# Patient Record
Sex: FEMALE | Race: WHITE | NOT HISPANIC OR LATINO | Employment: OTHER | URBAN - METROPOLITAN AREA
[De-identification: names, ages, dates, MRNs, and addresses within clinical notes are randomized per-mention and may not be internally consistent; named-entity substitution may affect disease eponyms.]

---

## 2017-04-05 ENCOUNTER — OFFICE VISIT (OUTPATIENT)
Dept: INTERNAL MEDICINE | Facility: CLINIC | Age: 77
End: 2017-04-05
Payer: MEDICARE

## 2017-04-05 VITALS
BODY MASS INDEX: 24.41 KG/M2 | WEIGHT: 143 LBS | HEIGHT: 64 IN | DIASTOLIC BLOOD PRESSURE: 70 MMHG | HEART RATE: 82 BPM | SYSTOLIC BLOOD PRESSURE: 100 MMHG

## 2017-04-05 DIAGNOSIS — M85.80 OSTEOPENIA, UNSPECIFIED LOCATION: ICD-10-CM

## 2017-04-05 DIAGNOSIS — M85.9 DISORDER OF BONE DENSITY AND STRUCTURE, UNSPECIFIED: ICD-10-CM

## 2017-04-05 DIAGNOSIS — M81.0 AGE-RELATED OSTEOPOROSIS WITHOUT CURRENT PATHOLOGICAL FRACTURE: ICD-10-CM

## 2017-04-05 DIAGNOSIS — E04.1 THYROID NODULE: ICD-10-CM

## 2017-04-05 DIAGNOSIS — F70 MILD MENTAL RETARDATION: Primary | ICD-10-CM

## 2017-04-05 DIAGNOSIS — F20.89 OTHER SCHIZOPHRENIA: ICD-10-CM

## 2017-04-05 DIAGNOSIS — Z12.31 OTHER SCREENING MAMMOGRAM: ICD-10-CM

## 2017-04-05 DIAGNOSIS — M85.872 OTHER SPECIFIED DISORDERS OF BONE DENSITY AND STRUCTURE, LEFT ANKLE AND FOOT: ICD-10-CM

## 2017-04-05 PROCEDURE — 99215 OFFICE O/P EST HI 40 MIN: CPT | Mod: S$PBB,,, | Performed by: INTERNAL MEDICINE

## 2017-04-05 PROCEDURE — 99999 PR PBB SHADOW E&M-EST. PATIENT-LVL III: CPT | Mod: PBBFAC,,, | Performed by: INTERNAL MEDICINE

## 2017-04-05 PROCEDURE — 99213 OFFICE O/P EST LOW 20 MIN: CPT | Mod: PBBFAC | Performed by: INTERNAL MEDICINE

## 2017-04-05 RX ORDER — CALCIUM CARBONATE 500(1250)
1 TABLET ORAL 2 TIMES DAILY
COMMUNITY

## 2017-04-05 RX ORDER — MINERAL OIL
180 ENEMA (ML) RECTAL DAILY PRN
COMMUNITY

## 2017-04-05 NOTE — PROGRESS NOTES
CHIEF COMPLAINT: Follow up of osteopenia, mild mentally handicapped, thyroid nodule and schizophrenia.     HPI: This is a 76 year old woman from [place] who presents with her QMRP, Joana followup of above. All history is obtained from Joana due to medical problems.Nettie continues to take Tums twice a day and multivitamin for her history of osteopenia. She had osteopenia on bone mineral density in . Her bone density in  was improved. BMD 4/15 did not suggest treatment. She denies any constipation from the calcium supplement. She has been happy. Her anxiety has been controlled on Prozac 20 mg daily and abilify 2 mg daily. No depression. Sleeping well. She denies hallucinations or insomnia. She continues to like to live in her group home at [facility]. Stable thyroid ultrasound 4/15. She denies any neck pain. She has been working at diet and exercise. Weight is stable. Energy level is good. She walks 4 days a week.      Denies neck pain. She had some neck pain and had physical therapy which helped     She has occasional numbness in her left foot when she sits too long. If she moves, her numbness resolves.    She complains of left knee pain in the evening and can keep her awake at night.     PAST MEDICAL HISTORY:   1. Mild mentally handicapped.   2. Schizophrenia.   3. A thyroid nodule, status post fine needle aspirate on 05/15/2003 , 2003 and 2006 (negative). Ultrasound in  was stable from   4. History of chest pain with a negative stress echocardiogram in 2003.   5. Osteopenia on bone mineral density 2004, improved in bone density . No treatmetn 2015    PAST SURGICAL HISTORY: Tonsillectomy and status post ORIF of the left leg.    MEDICATIONS: updated on epic    No known drug allergies.     SOCIAL HISTORY: No alcohol or tobacco. Resident of [facility].    FAMILY HISTORY: Her father  in his late 30s of a heart attack. Her mother  of Alzheimer's. A  "brother  in an explosion.     REVIEW OF SYSTEMS: She denies fevers, chills, night sweats, fatigue, visual changes, hearing loss, shortness of breath, chest pain, palpitations, nausea, vomiting, constipation, or diarrhea. She has a bowel movement once a day. She denies dysuria hematuria, polydipsia, polyuria, anemia, seizures, or numbness, joint pain or muscle pain    PHYSICAL EXAM:   /70  Pulse 82  Ht 5' 4" (1.626 m)  Wt 64.9 kg (143 lb)  BMI 24.55 kg/m2    GENERAL: She is alert, oriented, in no apparent distress.   Affect within normal limits. Conjunctivae anicteric. Pupils equal, round, and reactive   to light. EOMI. Tympanic membranes clear. Oropharynx clear.   NECK: Supple. No cervical lymphadenopathy. Left lobe of the thyroid was enlarged.. No JVD.   RESPIRATORY: Effort normal.   LUNGS: Clear to auscultation.   HEART: Regular rate and rhythm without murmurs, gallops, or rubs. No lower extremity edema.   ABDOMEN: Soft, nondistended, and nontender. Bowel sounds present. No hepatosplenomegaly  Breasts: No abnormalities seen, No nodules palpated. No axillary lympadenopathy.     ASSESSMENT AND PLAN:   1. Mild mentally handicapped, resident of [facility] [employer]. A 90L Form and [employer] form were filled out.   2. Schizophrenia, doing well followed by Dr. Hanna.   3. thyroid nodule. Stable thyroid ultrasound 4/15 - ordered  4. Osteopenia: On calcium. BMD 4/15  5. Screening. She is due for a mammogram  (ordered) . She had a normal colonscopy May 2009 - due . Bone mineral denisty 4/15, GYN exam   6. I will check a CBC, CMP, TSH, and fasting lipids, vitamin D level due to the above problems.   7. Numbness left foot - likely due to being sedentary - will monitor for now. Change positions often  8. LEft knee pain - tylenol 1 gram at bedtime  I will see Nettie back in 1 year, sooner if problems arise   Prevnar   "

## 2017-04-13 ENCOUNTER — LAB VISIT (OUTPATIENT)
Dept: LAB | Facility: HOSPITAL | Age: 77
End: 2017-04-13
Attending: INTERNAL MEDICINE
Payer: MEDICARE

## 2017-04-13 DIAGNOSIS — Z79.899 ENCOUNTER FOR LONG-TERM (CURRENT) USE OF OTHER MEDICATIONS: Primary | ICD-10-CM

## 2017-04-13 LAB
ALBUMIN SERPL BCP-MCNC: 3.5 G/DL
ALP SERPL-CCNC: 64 U/L
ALT SERPL W/O P-5'-P-CCNC: 18 U/L
ANION GAP SERPL CALC-SCNC: 8 MMOL/L
AST SERPL-CCNC: 23 U/L
BASOPHILS # BLD AUTO: 0.02 K/UL
BASOPHILS NFR BLD: 0.5 %
BILIRUB SERPL-MCNC: 0.4 MG/DL
BUN SERPL-MCNC: 27 MG/DL
CALCIUM SERPL-MCNC: 9.3 MG/DL
CHLORIDE SERPL-SCNC: 106 MMOL/L
CHOLEST/HDLC SERPL: 3.3 {RATIO}
CO2 SERPL-SCNC: 27 MMOL/L
CREAT SERPL-MCNC: 0.9 MG/DL
DIFFERENTIAL METHOD: ABNORMAL
EOSINOPHIL # BLD AUTO: 0.1 K/UL
EOSINOPHIL NFR BLD: 3.1 %
ERYTHROCYTE [DISTWIDTH] IN BLOOD BY AUTOMATED COUNT: 13.7 %
EST. GFR  (AFRICAN AMERICAN): >60 ML/MIN/1.73 M^2
EST. GFR  (NON AFRICAN AMERICAN): >60 ML/MIN/1.73 M^2
GLUCOSE SERPL-MCNC: 90 MG/DL
HCT VFR BLD AUTO: 33.4 %
HDL/CHOLESTEROL RATIO: 30.2 %
HDLC SERPL-MCNC: 159 MG/DL
HDLC SERPL-MCNC: 48 MG/DL
HGB BLD-MCNC: 11.1 G/DL
LDLC SERPL CALC-MCNC: 94 MG/DL
LYMPHOCYTES # BLD AUTO: 1.7 K/UL
LYMPHOCYTES NFR BLD: 43.3 %
MCH RBC QN AUTO: 32.1 PG
MCHC RBC AUTO-ENTMCNC: 33.2 %
MCV RBC AUTO: 97 FL
MONOCYTES # BLD AUTO: 0.3 K/UL
MONOCYTES NFR BLD: 7.5 %
NEUTROPHILS # BLD AUTO: 1.8 K/UL
NEUTROPHILS NFR BLD: 45.6 %
NONHDLC SERPL-MCNC: 111 MG/DL
PLATELET # BLD AUTO: 235 K/UL
PMV BLD AUTO: 12.5 FL
POTASSIUM SERPL-SCNC: 4.4 MMOL/L
PROT SERPL-MCNC: 7 G/DL
RBC # BLD AUTO: 3.46 M/UL
SODIUM SERPL-SCNC: 141 MMOL/L
TRIGL SERPL-MCNC: 85 MG/DL
TSH SERPL DL<=0.005 MIU/L-ACNC: 0.62 UIU/ML
WBC # BLD AUTO: 3.86 K/UL

## 2017-04-13 PROCEDURE — 80061 LIPID PANEL: CPT

## 2017-04-13 PROCEDURE — 36415 COLL VENOUS BLD VENIPUNCTURE: CPT

## 2017-04-13 PROCEDURE — 80053 COMPREHEN METABOLIC PANEL: CPT

## 2017-04-13 PROCEDURE — 85025 COMPLETE CBC W/AUTO DIFF WBC: CPT

## 2017-04-13 PROCEDURE — 84443 ASSAY THYROID STIM HORMONE: CPT

## 2017-04-26 ENCOUNTER — HOSPITAL ENCOUNTER (OUTPATIENT)
Dept: RADIOLOGY | Facility: CLINIC | Age: 77
Discharge: HOME | End: 2017-04-26
Attending: INTERNAL MEDICINE
Payer: MEDICARE

## 2017-04-26 ENCOUNTER — HOSPITAL ENCOUNTER (OUTPATIENT)
Dept: RADIOLOGY | Facility: HOSPITAL | Age: 77
Discharge: HOME | End: 2017-04-26
Attending: INTERNAL MEDICINE
Payer: MEDICARE

## 2017-04-26 DIAGNOSIS — M85.9 DISORDER OF BONE DENSITY AND STRUCTURE, UNSPECIFIED: ICD-10-CM

## 2017-04-26 DIAGNOSIS — E04.1 THYROID NODULE: ICD-10-CM

## 2017-04-26 DIAGNOSIS — M85.872 OTHER SPECIFIED DISORDERS OF BONE DENSITY AND STRUCTURE, LEFT ANKLE AND FOOT: ICD-10-CM

## 2017-04-26 PROCEDURE — 77080 DXA BONE DENSITY AXIAL: CPT | Mod: 26,,, | Performed by: INTERNAL MEDICINE

## 2017-04-26 PROCEDURE — 76536 US EXAM OF HEAD AND NECK: CPT | Mod: TC

## 2017-04-26 PROCEDURE — 77080 DXA BONE DENSITY AXIAL: CPT | Mod: TC

## 2017-04-26 PROCEDURE — 76536 US EXAM OF HEAD AND NECK: CPT | Mod: 26,,, | Performed by: RADIOLOGY

## 2017-05-07 RX ORDER — ALENDRONATE SODIUM 70 MG/1
70 TABLET ORAL
Qty: 4 TABLET | Refills: 11 | Status: SHIPPED | OUTPATIENT
Start: 2017-05-07 | End: 2018-05-07

## 2017-05-19 ENCOUNTER — TELEPHONE (OUTPATIENT)
Dept: OBSTETRICS AND GYNECOLOGY | Facility: CLINIC | Age: 77
End: 2017-05-19

## 2017-05-19 DIAGNOSIS — Z12.39 BREAST CANCER SCREENING: Primary | ICD-10-CM

## 2017-05-19 NOTE — TELEPHONE ENCOUNTER
----- Message from Delores Montes De Oca MA sent at 5/19/2017  9:39 AM CDT -----      ----- Message -----     From: Karel Tobias     Sent: 5/19/2017   9:33 AM       To: Stephen Gale Staff    Please put orders in for a mmg so I can schedule thanks

## 2017-06-28 ENCOUNTER — HOSPITAL ENCOUNTER (OUTPATIENT)
Dept: RADIOLOGY | Facility: HOSPITAL | Age: 77
Discharge: HOME | End: 2017-06-28
Attending: NURSE PRACTITIONER
Payer: MEDICARE

## 2017-06-28 VITALS — WEIGHT: 143 LBS | BODY MASS INDEX: 24.41 KG/M2 | HEIGHT: 64 IN

## 2017-06-28 DIAGNOSIS — Z12.39 BREAST CANCER SCREENING: ICD-10-CM

## 2017-06-28 DIAGNOSIS — Z12.31 VISIT FOR SCREENING MAMMOGRAM: ICD-10-CM

## 2017-06-28 PROCEDURE — 77067 SCR MAMMO BI INCL CAD: CPT | Mod: TC

## 2017-06-28 PROCEDURE — 77067 SCR MAMMO BI INCL CAD: CPT | Mod: 26,,, | Performed by: RADIOLOGY

## 2017-07-25 ENCOUNTER — OFFICE VISIT (OUTPATIENT)
Dept: OBSTETRICS AND GYNECOLOGY | Facility: CLINIC | Age: 77
End: 2017-07-25
Payer: MEDICARE

## 2017-07-25 VITALS
HEIGHT: 63 IN | SYSTOLIC BLOOD PRESSURE: 122 MMHG | DIASTOLIC BLOOD PRESSURE: 74 MMHG | WEIGHT: 140 LBS | BODY MASS INDEX: 24.8 KG/M2

## 2017-07-25 DIAGNOSIS — Z01.419 VISIT FOR GYNECOLOGIC EXAMINATION: Primary | ICD-10-CM

## 2017-07-25 DIAGNOSIS — Z78.0 POSTMENOPAUSE: ICD-10-CM

## 2017-07-25 PROCEDURE — G0101 CA SCREEN;PELVIC/BREAST EXAM: HCPCS | Mod: PBBFAC | Performed by: NURSE PRACTITIONER

## 2017-07-25 PROCEDURE — G0101 CA SCREEN;PELVIC/BREAST EXAM: HCPCS | Mod: S$PBB,,, | Performed by: NURSE PRACTITIONER

## 2017-07-25 PROCEDURE — 99212 OFFICE O/P EST SF 10 MIN: CPT | Mod: PBBFAC | Performed by: NURSE PRACTITIONER

## 2017-07-25 PROCEDURE — 99999 PR PBB SHADOW E&M-EST. PATIENT-LVL II: CPT | Mod: PBBFAC,,, | Performed by: NURSE PRACTITIONER

## 2017-10-05 ENCOUNTER — IMMUNIZATION (OUTPATIENT)
Dept: INTERNAL MEDICINE | Facility: CLINIC | Age: 77
End: 2017-10-05
Payer: MEDICARE

## 2017-10-05 PROCEDURE — 90662 IIV NO PRSV INCREASED AG IM: CPT | Mod: PBBFAC | Performed by: INTERNAL MEDICINE

## 2018-04-04 ENCOUNTER — OFFICE VISIT (OUTPATIENT)
Dept: INTERNAL MEDICINE | Facility: CLINIC | Age: 78
End: 2018-04-04
Payer: MEDICARE

## 2018-04-04 VITALS
WEIGHT: 143.69 LBS | HEIGHT: 63 IN | HEART RATE: 73 BPM | SYSTOLIC BLOOD PRESSURE: 110 MMHG | BODY MASS INDEX: 25.46 KG/M2 | DIASTOLIC BLOOD PRESSURE: 60 MMHG | OXYGEN SATURATION: 99 %

## 2018-04-04 DIAGNOSIS — M85.80 OSTEOPENIA, UNSPECIFIED LOCATION: ICD-10-CM

## 2018-04-04 DIAGNOSIS — Z12.31 SCREENING MAMMOGRAM, ENCOUNTER FOR: ICD-10-CM

## 2018-04-04 DIAGNOSIS — F20.89 OTHER SCHIZOPHRENIA: ICD-10-CM

## 2018-04-04 DIAGNOSIS — E04.1 THYROID NODULE: Primary | ICD-10-CM

## 2018-04-04 DIAGNOSIS — F70 MILD MENTAL RETARDATION: ICD-10-CM

## 2018-04-04 PROCEDURE — 99212 OFFICE O/P EST SF 10 MIN: CPT | Mod: PBBFAC | Performed by: INTERNAL MEDICINE

## 2018-04-04 PROCEDURE — 99999 PR PBB SHADOW E&M-EST. PATIENT-LVL II: CPT | Mod: PBBFAC,,, | Performed by: INTERNAL MEDICINE

## 2018-04-04 PROCEDURE — 99215 OFFICE O/P EST HI 40 MIN: CPT | Mod: S$PBB,,, | Performed by: INTERNAL MEDICINE

## 2018-04-04 RX ORDER — DOCUSATE SODIUM 100 MG/1
100 CAPSULE, LIQUID FILLED ORAL DAILY
COMMUNITY

## 2018-04-04 NOTE — PROGRESS NOTES
CHIEF COMPLAINT: Follow up of osteopenia, mild mentally handicapped, thyroid nodule and schizophrenia.     HPI: This is a 77 year old woman from [place] who presents with her nephew Buzz, followup of above. All history is obtained from Buzz due to medical problems.Nettie continues to take Tums twice a day and multivitamin for her history of osteopenia. She had osteopenia on bone mineral density in . Her bone density in  was improved. BMD 4/15 did not suggest treatment. She denies any constipation from the calcium supplement. She has been happy. Her anxiety has been controlled on Prozac 20 mg daily and abilify 2 mg daily. No depression. Sleeping well. She denies hallucinations or insomnia. She continues to like to live in her group home at [facility]. Stable thyroid ultrasound . She denies any neck pain. She has been working at diet and exercise at [employer]. Weight is stable. Energy level is good. .      Denies neck pain. She had some neck pain and had physical therapy in the past which helped     She has slight knee pain which comes and goes.     PAST MEDICAL HISTORY:   1. Mild mentally handicapped.   2. Schizophrenia.   3. A thyroid nodule, status post fine needle aspirate on 05/15/2003 , 2003 and 2006 (negative). Ultrasound in  was stable from   4. History of chest pain with a negative stress echocardiogram in 2003.   5. Osteopenia on bone mineral density 2004, improved in bone density . No treatmetn 2015    PAST SURGICAL HISTORY: Tonsillectomy and status post ORIF of the left leg.    MEDICATIONS: updated on epic    No known drug allergies.     SOCIAL HISTORY: No alcohol or tobacco. Resident of [facility].    FAMILY HISTORY: Her father  in his late 30s of a heart attack. Her mother  of Alzheimer's. A brother  in an explosion.     REVIEW OF SYSTEMS: She denies fevers, chills, night sweats, fatigue, visual changes, hearing loss, shortness of  "breath, chest pain, palpitations, nausea, vomiting, constipation, or diarrhea. She has a bowel movement once a day. She denies dysuria hematuria, polydipsia, polyuria, anemia, seizures, or numbness, joint pain or muscle pain    PHYSICAL EXAM:   /60   Pulse 73   Ht 5' 3" (1.6 m)   Wt 65.2 kg (143 lb 11.2 oz)   SpO2 99%   BMI 25.46 kg/m²     GENERAL: She is alert, oriented, in no apparent distress.   Affect within normal limits. Conjunctivae anicteric. Pupils equal, round, and reactive   to light. EOMI. Tympanic membranes clear. Oropharynx clear.   NECK: Supple. No cervical lymphadenopathy. Left lobe of the thyroid was enlarged.. No JVD.   RESPIRATORY: Effort normal.   LUNGS: Clear to auscultation.   HEART: Regular rate and rhythm without murmurs, gallops, or rubs. No lower extremity edema.   ABDOMEN: Soft, nondistended, and nontender. Bowel sounds present. No hepatosplenomegaly  Breasts: No abnormalities seen, No nodules palpated. No axillary lympadenopathy.     ASSESSMENT AND PLAN:   1. Mild mentally handicapped, resident of [place]. A 90L Form and [employer] form were filled out.   2. Schizophrenia, doing well followed by Dr. Hanna.   3. thyroid nodule. Stable thyroid ultrasound 4/17  4. Osteopenia: On calcium. BMD 4/17 worse, back on Fosamax 4/2017  5. Screening. She is due for a mammogram 6/17(ordered) . She had a normal colonscopy May 2009 - due 2019. Bone mineral denisty 4/17, GYN exam 4/13  6. I will check a CBC, CMP, TSH, and fasting lipids, vitamin D level due to the above problems.   7.  LEft knee pain - asx currently  I will see Nettie back in 1 year, sooner if problems arise   Prevnar 4/16  "

## 2018-04-12 ENCOUNTER — LAB VISIT (OUTPATIENT)
Dept: LAB | Facility: HOSPITAL | Age: 78
End: 2018-04-12
Attending: INTERNAL MEDICINE
Payer: MEDICARE

## 2018-04-12 DIAGNOSIS — Z79.899 DRUG THERAPY: Primary | ICD-10-CM

## 2018-04-12 LAB
ALBUMIN SERPL BCP-MCNC: 3.7 G/DL
ALP SERPL-CCNC: 64 U/L
ALT SERPL W/O P-5'-P-CCNC: 19 U/L
ANION GAP SERPL CALC-SCNC: 7 MMOL/L
AST SERPL-CCNC: 20 U/L
BASOPHILS # BLD AUTO: 0.03 K/UL
BASOPHILS NFR BLD: 0.8 %
BILIRUB SERPL-MCNC: 0.4 MG/DL
BUN SERPL-MCNC: 25 MG/DL
CALCIUM SERPL-MCNC: 9.8 MG/DL
CHLORIDE SERPL-SCNC: 106 MMOL/L
CHOLEST SERPL-MCNC: 159 MG/DL
CHOLEST/HDLC SERPL: 3.2 {RATIO}
CO2 SERPL-SCNC: 29 MMOL/L
CREAT SERPL-MCNC: 0.9 MG/DL
DIFFERENTIAL METHOD: ABNORMAL
EOSINOPHIL # BLD AUTO: 0.1 K/UL
EOSINOPHIL NFR BLD: 3.2 %
ERYTHROCYTE [DISTWIDTH] IN BLOOD BY AUTOMATED COUNT: 13.1 %
EST. GFR  (AFRICAN AMERICAN): >60 ML/MIN/1.73 M^2
EST. GFR  (NON AFRICAN AMERICAN): >60 ML/MIN/1.73 M^2
GLUCOSE SERPL-MCNC: 90 MG/DL
HCT VFR BLD AUTO: 32.9 %
HDLC SERPL-MCNC: 49 MG/DL
HDLC SERPL: 30.8 %
HGB BLD-MCNC: 10.7 G/DL
IMM GRANULOCYTES # BLD AUTO: 0 K/UL
IMM GRANULOCYTES NFR BLD AUTO: 0 %
LDLC SERPL CALC-MCNC: 93 MG/DL
LYMPHOCYTES # BLD AUTO: 1.6 K/UL
LYMPHOCYTES NFR BLD: 42.1 %
MCH RBC QN AUTO: 33.4 PG
MCHC RBC AUTO-ENTMCNC: 32.5 G/DL
MCV RBC AUTO: 103 FL
MONOCYTES # BLD AUTO: 0.3 K/UL
MONOCYTES NFR BLD: 8.8 %
NEUTROPHILS # BLD AUTO: 1.7 K/UL
NEUTROPHILS NFR BLD: 45.1 %
NONHDLC SERPL-MCNC: 110 MG/DL
NRBC BLD-RTO: 0 /100 WBC
PLATELET # BLD AUTO: 200 K/UL
PMV BLD AUTO: 12.7 FL
POTASSIUM SERPL-SCNC: 4.5 MMOL/L
PROT SERPL-MCNC: 7.1 G/DL
RBC # BLD AUTO: 3.2 M/UL
SODIUM SERPL-SCNC: 142 MMOL/L
TRIGL SERPL-MCNC: 85 MG/DL
TSH SERPL DL<=0.005 MIU/L-ACNC: 0.65 UIU/ML
WBC # BLD AUTO: 3.73 K/UL

## 2018-04-12 PROCEDURE — 80061 LIPID PANEL: CPT

## 2018-04-12 PROCEDURE — 84443 ASSAY THYROID STIM HORMONE: CPT

## 2018-04-12 PROCEDURE — 36415 COLL VENOUS BLD VENIPUNCTURE: CPT

## 2018-04-12 PROCEDURE — 85025 COMPLETE CBC W/AUTO DIFF WBC: CPT

## 2018-04-12 PROCEDURE — 80053 COMPREHEN METABOLIC PANEL: CPT

## 2018-04-23 ENCOUNTER — LAB VISIT (OUTPATIENT)
Dept: LAB | Facility: HOSPITAL | Age: 78
End: 2018-04-23
Attending: INTERNAL MEDICINE
Payer: MEDICARE

## 2018-04-23 DIAGNOSIS — Z79.899 NEED FOR PROPHYLACTIC CHEMOTHERAPY: Primary | ICD-10-CM

## 2018-04-23 LAB — VIT B12 SERPL-MCNC: 372 PG/ML

## 2018-04-23 PROCEDURE — 82607 VITAMIN B-12: CPT

## 2018-06-29 ENCOUNTER — HOSPITAL ENCOUNTER (OUTPATIENT)
Dept: RADIOLOGY | Facility: HOSPITAL | Age: 78
Discharge: HOME | End: 2018-06-29
Attending: INTERNAL MEDICINE
Payer: MEDICARE

## 2018-06-29 DIAGNOSIS — Z12.31 SCREENING MAMMOGRAM, ENCOUNTER FOR: ICD-10-CM

## 2018-06-29 PROCEDURE — 77067 SCR MAMMO BI INCL CAD: CPT | Mod: 26,,, | Performed by: RADIOLOGY

## 2018-06-29 PROCEDURE — 77067 SCR MAMMO BI INCL CAD: CPT | Mod: TC

## 2018-10-23 ENCOUNTER — IMMUNIZATION (OUTPATIENT)
Dept: INTERNAL MEDICINE | Facility: CLINIC | Age: 78
End: 2018-10-23
Payer: MEDICARE

## 2018-10-23 PROCEDURE — 90662 IIV NO PRSV INCREASED AG IM: CPT | Mod: PBBFAC

## 2019-04-03 ENCOUNTER — OFFICE VISIT (OUTPATIENT)
Dept: INTERNAL MEDICINE | Facility: CLINIC | Age: 79
End: 2019-04-03
Payer: MEDICARE

## 2019-04-03 VITALS
WEIGHT: 143.75 LBS | HEIGHT: 60 IN | HEART RATE: 81 BPM | SYSTOLIC BLOOD PRESSURE: 120 MMHG | DIASTOLIC BLOOD PRESSURE: 56 MMHG | BODY MASS INDEX: 28.22 KG/M2

## 2019-04-03 DIAGNOSIS — Z12.31 SCREENING MAMMOGRAM, ENCOUNTER FOR: ICD-10-CM

## 2019-04-03 DIAGNOSIS — Z12.11 SCREENING FOR MALIGNANT NEOPLASM OF COLON: Primary | ICD-10-CM

## 2019-04-03 DIAGNOSIS — F70 MILD MENTAL RETARDATION: ICD-10-CM

## 2019-04-03 DIAGNOSIS — M85.80 OSTEOPENIA, UNSPECIFIED LOCATION: ICD-10-CM

## 2019-04-03 DIAGNOSIS — E04.1 THYROID NODULE: ICD-10-CM

## 2019-04-03 DIAGNOSIS — M89.9 BONE DISORDER: ICD-10-CM

## 2019-04-03 DIAGNOSIS — F20.89 OTHER SCHIZOPHRENIA: ICD-10-CM

## 2019-04-03 PROCEDURE — 99215 PR OFFICE/OUTPT VISIT, EST, LEVL V, 40-54 MIN: ICD-10-PCS | Mod: S$PBB,,, | Performed by: INTERNAL MEDICINE

## 2019-04-03 PROCEDURE — 99213 OFFICE O/P EST LOW 20 MIN: CPT | Mod: PBBFAC | Performed by: INTERNAL MEDICINE

## 2019-04-03 PROCEDURE — 99999 PR PBB SHADOW E&M-EST. PATIENT-LVL III: CPT | Mod: PBBFAC,,, | Performed by: INTERNAL MEDICINE

## 2019-04-03 PROCEDURE — 99215 OFFICE O/P EST HI 40 MIN: CPT | Mod: S$PBB,,, | Performed by: INTERNAL MEDICINE

## 2019-04-03 PROCEDURE — 99999 PR PBB SHADOW E&M-EST. PATIENT-LVL III: ICD-10-PCS | Mod: PBBFAC,,, | Performed by: INTERNAL MEDICINE

## 2019-04-03 RX ORDER — ACETAMINOPHEN 500 MG
1000 TABLET ORAL NIGHTLY
COMMUNITY

## 2019-04-03 NOTE — PROGRESS NOTES
CHIEF COMPLAINT: Follow up of osteopenia, mild mentally handicapped, thyroid nodule and schizophrenia.     HPI: This is a 78 year old woman from [facility] who presents with her nephew Buzz, followup of above. All history is obtained from Buzz due to medical problems.Nettie continues to take Tums twice a day and multivitamin for her history of osteopenia. She had osteopenia on bone mineral density in . Her bone density in  was improved. BMD 4/15 did not suggest treatment. She denies any constipation from the calcium supplement. She has been happy. Her anxiety has been controlled on Prozac 20 mg daily and abilify 2 mg daily. No depression. Sleeping well. She denies hallucinations or insomnia. She continues to like to live in her group home at [facility]. Stable thyroid ultrasound . She denies any neck pain. She has been working at diet and exercise at [employer]. Weight is stable. Energy level is good. .      Denies neck pain. She had some neck pain and had physical therapy in the past which helped     She has slight knee pain which comes and goes. She is taking tylenol in the evening which is helping.     PAST MEDICAL HISTORY:   1. Mild mentally handicapped.   2. Schizophrenia.   3. A thyroid nodule, status post fine needle aspirate on 05/15/2003 , 2003 and 2006 (negative). Ultrasound in  was stable from   4. History of chest pain with a negative stress echocardiogram in 2003.   5. Osteopenia on bone mineral density 2004, improved in bone density . No treatmetn 2015    PAST SURGICAL HISTORY: Tonsillectomy and status post ORIF of the left leg.    MEDICATIONS: updated on epic    No known drug allergies.     SOCIAL HISTORY: No alcohol or tobacco. Resident of [facility].    FAMILY HISTORY: Her father  in his late 30s of a heart attack. Her mother  of Alzheimer's. A brother  in an explosion.     REVIEW OF SYSTEMS: She denies fevers, chills, night sweats,  fatigue, visual changes, hearing loss, shortness of breath, chest pain, palpitations, nausea, vomiting, constipation, or diarrhea. She has a bowel movement once a day. She denies dysuria hematuria, polydipsia, polyuria, anemia, seizures, or numbness, joint pain or muscle pain    PHYSICAL EXAM:   BP (!) 120/56   Pulse 81   Ht 5' (1.524 m)   Wt 65.2 kg (143 lb 11.8 oz)   BMI 28.07 kg/m²     GENERAL: She is alert, oriented, in no apparent distress.   Affect within normal limits. Conjunctivae anicteric. Pupils equal, round, and reactive   to light. EOMI. Tympanic membranes clear. Oropharynx clear.   NECK: Supple. No cervical lymphadenopathy. Left lobe of the thyroid was enlarged.. No JVD.   RESPIRATORY: Effort normal.   LUNGS: Clear to auscultation.   HEART: Regular rate and rhythm without murmurs, gallops, or rubs. No lower extremity edema.   ABDOMEN: Soft, nondistended, and nontender. Bowel sounds present. No hepatosplenomegaly  Breasts: No abnormalities seen, No nodules palpated. No axillary lympadenopathy.     ASSESSMENT AND PLAN:   1. Mild mentally handicapped, resident of [facility]. A 90L Form filled out.   2. Schizophrenia, doing well followed by Dr. Hanna.   3. thyroid nodule. Stable thyroid ultrasound 4/17  4. Osteopenia: On calcium. BMD 4/17 worse, back on Fosamax 4/2017  5. Screening. She is due for a mammogram 6/18(ordered) . She had a normal colonscopy May 2009 - due 2019 - ordered. Bone mineral denisty 4/17 -ordered, GYN exam 4/13  6. I will check a CBC, CMP, TSH, and fasting lipids, vitamin D level due to the above problems.   7.  LEft knee pain - stable with tylenol  I will see Nettie back in 1 year, sooner if problems arise   Prevnar 4/16

## 2019-04-11 ENCOUNTER — LAB VISIT (OUTPATIENT)
Dept: LAB | Facility: HOSPITAL | Age: 79
End: 2019-04-11
Attending: INTERNAL MEDICINE
Payer: MEDICARE

## 2019-04-11 DIAGNOSIS — Z79.899 NEED FOR PROPHYLACTIC CHEMOTHERAPY: Primary | ICD-10-CM

## 2019-04-11 LAB
ALBUMIN SERPL BCP-MCNC: 3.8 G/DL (ref 3.5–5.2)
ALP SERPL-CCNC: 70 U/L (ref 55–135)
ALT SERPL W/O P-5'-P-CCNC: 19 U/L (ref 10–44)
ANION GAP SERPL CALC-SCNC: 7 MMOL/L (ref 8–16)
AST SERPL-CCNC: 20 U/L (ref 10–40)
BASOPHILS # BLD AUTO: 0.03 K/UL (ref 0–0.2)
BASOPHILS NFR BLD: 0.5 % (ref 0–1.9)
BILIRUB SERPL-MCNC: 0.4 MG/DL (ref 0.1–1)
BUN SERPL-MCNC: 32 MG/DL (ref 8–23)
CALCIUM SERPL-MCNC: 9.9 MG/DL (ref 8.7–10.5)
CHLORIDE SERPL-SCNC: 106 MMOL/L (ref 95–110)
CHOLEST SERPL-MCNC: 162 MG/DL (ref 120–199)
CHOLEST/HDLC SERPL: 3.4 {RATIO} (ref 2–5)
CO2 SERPL-SCNC: 28 MMOL/L (ref 23–29)
CREAT SERPL-MCNC: 0.9 MG/DL (ref 0.5–1.4)
DIFFERENTIAL METHOD: ABNORMAL
EOSINOPHIL # BLD AUTO: 0.1 K/UL (ref 0–0.5)
EOSINOPHIL NFR BLD: 1.8 % (ref 0–8)
ERYTHROCYTE [DISTWIDTH] IN BLOOD BY AUTOMATED COUNT: 13.2 % (ref 11.5–14.5)
EST. GFR  (AFRICAN AMERICAN): >60 ML/MIN/1.73 M^2
EST. GFR  (NON AFRICAN AMERICAN): >60 ML/MIN/1.73 M^2
FERRITIN SERPL-MCNC: 65 NG/ML (ref 20–300)
GLUCOSE SERPL-MCNC: 91 MG/DL (ref 70–110)
HCT VFR BLD AUTO: 34.3 % (ref 37–48.5)
HDLC SERPL-MCNC: 47 MG/DL (ref 40–75)
HDLC SERPL: 29 % (ref 20–50)
HGB BLD-MCNC: 10.8 G/DL (ref 12–16)
IMM GRANULOCYTES # BLD AUTO: 0.01 K/UL (ref 0–0.04)
IMM GRANULOCYTES NFR BLD AUTO: 0.2 % (ref 0–0.5)
LDLC SERPL CALC-MCNC: 98.6 MG/DL (ref 63–159)
LYMPHOCYTES # BLD AUTO: 1.5 K/UL (ref 1–4.8)
LYMPHOCYTES NFR BLD: 25 % (ref 18–48)
MCH RBC QN AUTO: 32.8 PG (ref 27–31)
MCHC RBC AUTO-ENTMCNC: 31.5 G/DL (ref 32–36)
MCV RBC AUTO: 104 FL (ref 82–98)
MONOCYTES # BLD AUTO: 0.5 K/UL (ref 0.3–1)
MONOCYTES NFR BLD: 8.2 % (ref 4–15)
NEUTROPHILS # BLD AUTO: 3.9 K/UL (ref 1.8–7.7)
NEUTROPHILS NFR BLD: 64.3 % (ref 38–73)
NONHDLC SERPL-MCNC: 115 MG/DL
NRBC BLD-RTO: 0 /100 WBC
PLATELET # BLD AUTO: 209 K/UL (ref 150–350)
PMV BLD AUTO: 12.7 FL (ref 9.2–12.9)
POTASSIUM SERPL-SCNC: 4.5 MMOL/L (ref 3.5–5.1)
PROT SERPL-MCNC: 7.3 G/DL (ref 6–8.4)
RBC # BLD AUTO: 3.29 M/UL (ref 4–5.4)
SODIUM SERPL-SCNC: 141 MMOL/L (ref 136–145)
TRIGL SERPL-MCNC: 82 MG/DL (ref 30–150)
TSH SERPL DL<=0.005 MIU/L-ACNC: 0.78 UIU/ML (ref 0.4–4)
WBC # BLD AUTO: 6.07 K/UL (ref 3.9–12.7)

## 2019-04-11 PROCEDURE — 85025 COMPLETE CBC W/AUTO DIFF WBC: CPT

## 2019-04-11 PROCEDURE — 80053 COMPREHEN METABOLIC PANEL: CPT

## 2019-04-11 PROCEDURE — 84443 ASSAY THYROID STIM HORMONE: CPT

## 2019-04-11 PROCEDURE — 82728 ASSAY OF FERRITIN: CPT

## 2019-04-11 PROCEDURE — 80061 LIPID PANEL: CPT

## 2019-04-12 ENCOUNTER — HOSPITAL ENCOUNTER (OUTPATIENT)
Dept: RADIOLOGY | Facility: CLINIC | Age: 79
Discharge: HOME | End: 2019-04-12
Attending: INTERNAL MEDICINE
Payer: MEDICARE

## 2019-04-12 DIAGNOSIS — M89.9 BONE DISORDER: ICD-10-CM

## 2019-04-12 PROCEDURE — 77080 DEXA BONE DENSITY SPINE HIP: ICD-10-PCS | Mod: 26,,, | Performed by: INTERNAL MEDICINE

## 2019-04-12 PROCEDURE — 77080 DXA BONE DENSITY AXIAL: CPT | Mod: TC

## 2019-04-12 PROCEDURE — 77080 DXA BONE DENSITY AXIAL: CPT | Mod: 26,,, | Performed by: INTERNAL MEDICINE

## 2019-07-05 ENCOUNTER — HOSPITAL ENCOUNTER (OUTPATIENT)
Dept: RADIOLOGY | Facility: HOSPITAL | Age: 79
Discharge: HOME | End: 2019-07-05
Attending: INTERNAL MEDICINE
Payer: MEDICARE

## 2019-07-05 DIAGNOSIS — Z12.31 SCREENING MAMMOGRAM, ENCOUNTER FOR: ICD-10-CM

## 2019-07-05 PROCEDURE — 77067 MAMMO DIGITAL SCREENING BILAT WITH CAD: ICD-10-PCS | Mod: 26,,, | Performed by: RADIOLOGY

## 2019-07-05 PROCEDURE — 77067 SCR MAMMO BI INCL CAD: CPT | Mod: TC

## 2019-07-05 PROCEDURE — 77067 SCR MAMMO BI INCL CAD: CPT | Mod: 26,,, | Performed by: RADIOLOGY

## 2019-07-30 ENCOUNTER — TELEPHONE (OUTPATIENT)
Dept: OBSTETRICS AND GYNECOLOGY | Facility: CLINIC | Age: 79
End: 2019-07-30

## 2019-07-30 ENCOUNTER — OFFICE VISIT (OUTPATIENT)
Dept: OBSTETRICS AND GYNECOLOGY | Facility: CLINIC | Age: 79
End: 2019-07-30
Payer: MEDICARE

## 2019-07-30 VITALS
DIASTOLIC BLOOD PRESSURE: 68 MMHG | BODY MASS INDEX: 28.57 KG/M2 | SYSTOLIC BLOOD PRESSURE: 134 MMHG | HEIGHT: 60 IN | WEIGHT: 145.5 LBS

## 2019-07-30 DIAGNOSIS — Z78.0 POSTMENOPAUSE: ICD-10-CM

## 2019-07-30 DIAGNOSIS — Z86.018 HISTORY OF UTERINE FIBROID: ICD-10-CM

## 2019-07-30 DIAGNOSIS — Z01.419 WELL WOMAN EXAM WITH ROUTINE GYNECOLOGICAL EXAM: Primary | ICD-10-CM

## 2019-07-30 PROCEDURE — G0101 CA SCREEN;PELVIC/BREAST EXAM: HCPCS | Mod: S$PBB,,, | Performed by: NURSE PRACTITIONER

## 2019-07-30 PROCEDURE — 99999 PR PBB SHADOW E&M-EST. PATIENT-LVL III: CPT | Mod: PBBFAC,,, | Performed by: NURSE PRACTITIONER

## 2019-07-30 PROCEDURE — 99999 PR PBB SHADOW E&M-EST. PATIENT-LVL III: ICD-10-PCS | Mod: PBBFAC,,, | Performed by: NURSE PRACTITIONER

## 2019-07-30 PROCEDURE — G0101 PR CA SCREEN;PELVIC/BREAST EXAM: ICD-10-PCS | Mod: S$PBB,,, | Performed by: NURSE PRACTITIONER

## 2019-07-30 PROCEDURE — G0101 CA SCREEN;PELVIC/BREAST EXAM: HCPCS | Mod: PBBFAC | Performed by: NURSE PRACTITIONER

## 2019-07-30 PROCEDURE — 99213 OFFICE O/P EST LOW 20 MIN: CPT | Mod: PBBFAC,25 | Performed by: NURSE PRACTITIONER

## 2019-07-30 NOTE — PROGRESS NOTES
HISTORY OF PRESENT ILLNESS:    Nettie Villasenor is a 78 y.o. female , presents with an Aide from [facility]  for a routine exam and has no gyn complaints.      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    Osteopenia 3/13/2013    Schizophrenia 3/13/2013    Thyroid nodule        Past Surgical History:   Procedure Laterality Date    ORIF left lower leg      TONSILLECTOMY          MEDICATIONS AND ALLERGIES:      Current Outpatient Medications:     ABILIFY 2 mg Tab, Take 2 mg by mouth once daily. , Disp: , Rfl:     acetaminophen (TYLENOL EXTRA STRENGTH) 500 MG tablet, Take 1,000 mg by mouth every evening., Disp: , Rfl:     aspirin 81 MG Chew, Take 81 mg by mouth once daily., Disp: , Rfl:     calcium carbonate (OS-OTTONIEL) 500 mg calcium (1,250 mg) tablet, Take 1 tablet by mouth 2 (two) times daily., Disp: , Rfl:     docusate sodium (COLACE) 100 MG capsule, Take 100 mg by mouth once daily., Disp: , Rfl:     fexofenadine (ALLEGRA) 180 MG tablet, Take 180 mg by mouth daily as needed., Disp: , Rfl:     fluoxetine (PROZAC) 20 MG capsule, Take 20 mg by mouth once daily. , Disp: , Rfl:     lorazepam (ATIVAN) 0.5 MG tablet, Take 0.5 mg by mouth every 12 (twelve) hours as needed.  , Disp: , Rfl:     multivitamin capsule, Take 1 capsule by mouth once daily., Disp: , Rfl:     vitamin D 1000 units Tab, Take 185 mg by mouth once daily., Disp: , Rfl:     alendronate (FOSAMAX) 70 MG tablet, Take 1 tablet (70 mg total) by mouth every 7 days., Disp: 4 tablet, Rfl: 11    Review of patient's allergies indicates:  No Known Allergies    Family History   Problem Relation Age of Onset    Dementia Mother     Heart attack Father     Melanoma Neg Hx     Breast cancer Neg Hx     Colon cancer Neg Hx     Ovarian cancer Neg Hx        Social History     Socioeconomic History    Marital status: Single     Spouse name: Not on file    Number  of children: Not on file    Years of education: Not on file    Highest education level: Not on file   Occupational History    Not on file   Social Needs    Financial resource strain: Not on file    Food insecurity:     Worry: Not on file     Inability: Not on file    Transportation needs:     Medical: Not on file     Non-medical: Not on file   Tobacco Use    Smoking status: Never Smoker    Smokeless tobacco: Never Used   Substance and Sexual Activity    Alcohol use: No    Drug use: No    Sexual activity: Never   Lifestyle    Physical activity:     Days per week: Not on file     Minutes per session: Not on file    Stress: Not on file   Relationships    Social connections:     Talks on phone: Not on file     Gets together: Not on file     Attends Druze service: Not on file     Active member of club or organization: Not on file     Attends meetings of clubs or organizations: Not on file     Relationship status: Not on file   Other Topics Concern    Are you pregnant or think you may be? Not Asked    Breast-feeding Not Asked   Social History Narrative    Not on file       OBSTETRIC HISTORY: None    COMPREHENSIVE GYN HISTORY: There is no chart history of:  Pap History: Denies abn Paps.  Infection History: Denies STDs. Denies PID.  Benign History: Reports uterine fibroids. Denies ovarian cysts. Denies endometriosis.   Cancer History: Denies Cervical Cancer. Denies Uterus Cancer. Denies Ovarian Cancer. Denies Vaginal Cancer. Denies Vulvar Cancer. Denies Breast Cancer. Denies Colon Cancer.  Sexual Activity History: Denies sexual activity.   Menstrual History: Denies menses. Patient is postmenopausal: Not on HRT/ERT.    ROS:  GENERAL: No weight changes. No swelling. No fatigue. No fever.  CARDIOVASCULAR: No chest pain. No shortness of breath. No leg cramps.   NEUROLOGICAL: No headaches. No vision changes.  BREASTS: No pain. No lumps. No discharge.  ABDOMEN: No pain. No nausea. No vomiting. No diarrhea. No  constipation.  REPRODUCTIVE: No abnormal bleeding.   VULVA: No pain. No lesions. No itching.  VAGINA: No relaxation. No itching. No odor. No discharge. No lesions.  URINARY: No incontinence. No nocturia. No frequency. No dysuria.    /68   Ht 5' (1.524 m)   Wt 66 kg (145 lb 8.1 oz)   BMI 28.42 kg/m²   7-25-17 Wt 63.5 kg (140 lb)    PE:  APPEARANCE: Well nourished, well developed, in no acute distress.  AFFECT: WNL, alert and oriented x 3.  SKIN: No hirsutism or acne.  NECK: Neck symmetric without masses or thyromegaly.  NODES: No inguinal, cervical, axillary or femoral lymph node enlargement.  CHEST: Good respiratory effort.   ABDOMEN: Soft. No tenderness or masses.   BREASTS: Symmetrical, no skin changes or visible lesions. No palpable masses, nipple discharge bilaterally.  PELVIC: ATROPHIC EXTERNAL FEMALE GENITALIA without lesions. Normal hair distribution. Adequate perineal body, normal urethral meatus. VAGINA DRY without lesions or discharge. CERVIX STENOTIC without lesions, discharge or tenderness. No significant cystocele or rectocele. Bimanual exam shows uterus to be normal size, regular, mobile and nontender. Adnexa without masses or tenderness.  RECTAL: Rectovaginal exam confirms above with normal sphincter tone, no masses.  EXTREMITIES: No edema.    DIAGNOSIS:  1. Well woman exam with routine gynecological exam    2. Postmenopause    3. History of uterine fibroid        PLAN:    ORDERS:  Mammogram ordered and up to date on BMD and colonoscopy    COUNSELING:  The patient / Aide were counseled today on:  -osteoporosis prevention and regular weight bearing exercise;  -A.C.S. Pap and pelvic exam guidelines (pap every 3 years, no pap after age 65) and recommendations for yearly mammogram;  -to see her PCP for other health maintenance.    FOLLOW-UP with Dr Manuel in two years.

## 2019-10-10 ENCOUNTER — HOSPITAL ENCOUNTER (OUTPATIENT)
Dept: RADIOLOGY | Facility: HOSPITAL | Age: 79
Discharge: HOME | End: 2019-10-10
Attending: INTERNAL MEDICINE
Payer: MEDICARE

## 2019-10-10 ENCOUNTER — TELEPHONE (OUTPATIENT)
Dept: INTERNAL MEDICINE | Facility: CLINIC | Age: 79
End: 2019-10-10

## 2019-10-10 DIAGNOSIS — M79.604 RIGHT LEG PAIN: ICD-10-CM

## 2019-10-10 DIAGNOSIS — S82.831A CLOSED FRACTURE OF DISTAL END OF RIGHT FIBULA, UNSPECIFIED FRACTURE MORPHOLOGY, INITIAL ENCOUNTER: Primary | ICD-10-CM

## 2019-10-10 DIAGNOSIS — M79.604 RIGHT LEG PAIN: Primary | ICD-10-CM

## 2019-10-10 PROCEDURE — 73630 X-RAY EXAM OF FOOT: CPT | Mod: TC,RT

## 2019-10-10 PROCEDURE — 73630 XR FOOT COMPLETE 3 VIEW RIGHT: ICD-10-PCS | Mod: 26,RT,, | Performed by: RADIOLOGY

## 2019-10-10 PROCEDURE — 73610 X-RAY EXAM OF ANKLE: CPT | Mod: TC,RT

## 2019-10-10 PROCEDURE — 73610 X-RAY EXAM OF ANKLE: CPT | Mod: 26,RT,, | Performed by: RADIOLOGY

## 2019-10-10 PROCEDURE — 73610 XR ANKLE COMPLETE 3 VIEW RIGHT: ICD-10-PCS | Mod: 26,RT,, | Performed by: RADIOLOGY

## 2019-10-10 PROCEDURE — 73630 X-RAY EXAM OF FOOT: CPT | Mod: 26,RT,, | Performed by: RADIOLOGY

## 2019-10-10 NOTE — TELEPHONE ENCOUNTER
----- Message from Amita Callahan MA sent at 10/10/2019  2:58 PM CDT -----  Contact: Dr. Belle/ Radiology ext [phone]      ----- Message -----  From: Bruno Irvin  Sent: 10/10/2019   2:56 PM CDT  To: Scott ESPINOZA Staff    Patient has a possible fracture laterally right ankle.    Thank you

## 2019-10-11 ENCOUNTER — OFFICE VISIT (OUTPATIENT)
Dept: ORTHOPEDICS | Facility: CLINIC | Age: 79
End: 2019-10-11
Payer: MEDICARE

## 2019-10-11 ENCOUNTER — PATIENT OUTREACH (OUTPATIENT)
Dept: ADMINISTRATIVE | Facility: OTHER | Age: 79
End: 2019-10-11

## 2019-10-11 VITALS — BODY MASS INDEX: 28.57 KG/M2 | HEIGHT: 60 IN | WEIGHT: 145.5 LBS

## 2019-10-11 DIAGNOSIS — S82.831A CLOSED FRACTURE OF DISTAL END OF RIGHT FIBULA, UNSPECIFIED FRACTURE MORPHOLOGY, INITIAL ENCOUNTER: ICD-10-CM

## 2019-10-11 PROCEDURE — 99999 PR PBB SHADOW E&M-EST. PATIENT-LVL III: CPT | Mod: PBBFAC,,, | Performed by: NURSE PRACTITIONER

## 2019-10-11 PROCEDURE — 99999 PR PBB SHADOW E&M-EST. PATIENT-LVL III: ICD-10-PCS | Mod: PBBFAC,,, | Performed by: NURSE PRACTITIONER

## 2019-10-11 PROCEDURE — 99213 OFFICE O/P EST LOW 20 MIN: CPT | Mod: PBBFAC | Performed by: NURSE PRACTITIONER

## 2019-10-11 PROCEDURE — 99204 PR OFFICE/OUTPT VISIT, NEW, LEVL IV, 45-59 MIN: ICD-10-PCS | Mod: S$PBB,,, | Performed by: NURSE PRACTITIONER

## 2019-10-11 PROCEDURE — 99204 OFFICE O/P NEW MOD 45 MIN: CPT | Mod: S$PBB,,, | Performed by: NURSE PRACTITIONER

## 2019-10-11 NOTE — PROGRESS NOTES
SUBJECTIVE:     Chief Complaint & History of Present Illness:  Nettie Villasenor is a New 78 y.o. year old female patient here for constant right foot/ankle pain which has been present for 1 week.  There is a history of injury.  The pain is located in the lateral aspect of the foot.  The pain is described as achy, 0/10.  It is aggravated by walking and twisting of foot.  There is not radiation, numbness or tingling into the toes.  Associated symptoms include swelling, worsens with activity. Previous treatments include none.  There is not a history of previous injury or surgery to the foot.   The patient does not use an assistive device.     In summary, patient is a resident of [place], she states she twisted her ankle when trying to get into a van on Saturday.  She developed pain and swelling afterwards, she called her PCP who ordered x-rays.  X-rays concerned for a distal fibula fracture and she was then referred here.    Review of patient's allergies indicates:  No Known Allergies      Current Outpatient Medications   Medication Sig Dispense Refill    ABILIFY 2 mg Tab Take 2 mg by mouth once daily.       acetaminophen (TYLENOL EXTRA STRENGTH) 500 MG tablet Take 1,000 mg by mouth every evening.      aspirin 81 MG Chew Take 81 mg by mouth once daily.      calcium carbonate (OS-OTTONIEL) 500 mg calcium (1,250 mg) tablet Take 1 tablet by mouth 2 (two) times daily.      docusate sodium (COLACE) 100 MG capsule Take 100 mg by mouth once daily.      fexofenadine (ALLEGRA) 180 MG tablet Take 180 mg by mouth daily as needed.      fluoxetine (PROZAC) 20 MG capsule Take 20 mg by mouth once daily.       multivitamin capsule Take 1 capsule by mouth once daily.      vitamin D 1000 units Tab Take 185 mg by mouth once daily.      alendronate (FOSAMAX) 70 MG tablet Take 1 tablet (70 mg total) by mouth every 7 days. 4 tablet 11    lorazepam (ATIVAN) 0.5 MG tablet Take 0.5 mg by mouth every 12 (twelve) hours as needed.          No current facility-administered medications for this visit.        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    Osteopenia 3/13/2013    Schizophrenia 3/13/2013    Thyroid nodule        Past Surgical History:   Procedure Laterality Date    ORIF left lower leg      TONSILLECTOMY         Family History   Problem Relation Age of Onset    Dementia Mother     Heart attack Father     Melanoma Neg Hx     Breast cancer Neg Hx     Colon cancer Neg Hx     Ovarian cancer Neg Hx          Review of Systems:  ROS:  Constitutional: no fever or chills  Eyes: no visual changes  ENT: no nasal congestion or sore throat  Respiratory: no cough or shortness of breath  Cardiovascular: no chest pain or palpitations  Gastrointestinal: no nausea or vomiting, tolerating diet  Genitourinary: no hematuria or dysuria  Integument/Breast: no rash or pruritis  Hematologic/Lymphatic: no easy bruising or lymphadenopathy  Musculoskeletal: right ankle pain  Neurological: no seizures or tremors  Behavioral/Psych: no auditory or visual hallucinations  Endocrine: no heat or cold intolerance      OBJECTIVE:     PHYSICAL EXAM:  Vital Signs (Most Recent)  There were no vitals filed for this visit.  Height: 5' (152.4 cm) Weight: 66 kg (145 lb 8.1 oz),   General Appearance: Well nourished, well developed, in no acute distress.  HENT: Normal cephalic, oropharynx pink and moist  Eyes: PERRLA bilaterally and EOM x 4  Respiratory: Even and unlabored  Skin: Warm and Dry.   Psychiatric: AAO x 4, Mood & affect are normal.    right  Foot/Ankle    General appearance: no acute distress, alert/oriented x3, appropriate mood and affect, looks stated age and well nourished  The examination was performed out of splint/cast  Skin: bruising  Swelling: mild  Warmth: no warmth  Tenderness: diffuse  ROM: pain with inversion otherwise ROM is normal  Strength: normal  Gait:  antalgic  Stability: stable to testing and Cotton test: negative  Crepitus: no  Neurological Exam: normal  Vascular Exam: normal and pulse present    soft tissue swelling noted over the lateral and medial aspect of ankle      RADIOGRAPHS:  X-ray of right ankle and foot dated yesterday, personally reviewed by me and Dr. Byrnes shows what appears to be an avulsion fracture to tip of her fibula.  She has soft tissue swelling.  Ankle mortise appears intact and no other fractures seen.    ASSESSMENT/PLAN:       ICD-10-CM ICD-9-CM   1. Closed fracture of distal end of right fibula, unspecified fracture morphology, initial encounter S82.831A 824.8       Plan:  -Patient presents with c/c right ankle injury 1 week ago resulting in a distal fibula fracture.  -X-ray as above.  -Plan of care discussed with MD, he suggest walking boot as tolerated and to follow up in a couple of weeks.  -Recommend RICE therapy, Tylenol PRN for pain control.  -Patient to follow up in 4 weeks or sooner for new or worsening pain.

## 2019-10-11 NOTE — LETTER
October 11, 2019      Carolina Chavez MD  [address]  [facility] [address]           [facility]  [address]  [facility] [address]  Phone: [phone]          Patient: Nettie Villasenor   MR Number: [id number]   YOB: 1940   Date of Visit: 10/11/2019       Dear Dr. Carolina Chavez:    Thank you for referring Nettie Villasenor to me for evaluation. Attached you will find relevant portions of my assessment and plan of care.    If you have questions, please do not hesitate to call me. I look forward to following Nettie Villasenor along with you.    Sincerely,    Brennen Zuluaga, MELISSA    Enclosure  CC:  No Recipients    If you would like to receive this communication electronically, please contact [email or web address] or [phone] to request more information on [employer] access.    For providers and/or their staff who would like to refer a patient to [facility], please contact us through our one-stop-shop provider referral line, [facility] , at [phone].    If you feel you have received this communication in error or would no longer like to receive these types of communications, please e-mail [email or web address]

## 2019-10-11 NOTE — TELEPHONE ENCOUNTER
PLease notify annmarie    She had a small fracture on the right fibula  She needs to see Ortho - apt booked for today (friday) 10/11/19 at 1 pm in ortho with Brennen palma.

## 2019-10-22 ENCOUNTER — IMMUNIZATION (OUTPATIENT)
Dept: INTERNAL MEDICINE | Facility: CLINIC | Age: 79
End: 2019-10-22
Payer: MEDICARE

## 2019-10-22 PROCEDURE — 90662 IIV NO PRSV INCREASED AG IM: CPT | Mod: PBBFAC | Performed by: INTERNAL MEDICINE

## 2019-10-22 PROCEDURE — G0008 ADMIN INFLUENZA VIRUS VAC: HCPCS | Mod: PBBFAC

## 2019-11-06 ENCOUNTER — PATIENT OUTREACH (OUTPATIENT)
Dept: ADMINISTRATIVE | Facility: OTHER | Age: 79
End: 2019-11-06

## 2019-11-07 ENCOUNTER — HOSPITAL ENCOUNTER (OUTPATIENT)
Dept: RADIOLOGY | Facility: HOSPITAL | Age: 79
Discharge: HOME | End: 2019-11-07
Attending: NURSE PRACTITIONER
Payer: MEDICARE

## 2019-11-07 ENCOUNTER — OFFICE VISIT (OUTPATIENT)
Dept: ORTHOPEDICS | Facility: CLINIC | Age: 79
End: 2019-11-07
Payer: MEDICARE

## 2019-11-07 VITALS
RESPIRATION RATE: 18 BRPM | TEMPERATURE: 98 F | DIASTOLIC BLOOD PRESSURE: 63 MMHG | HEART RATE: 80 BPM | SYSTOLIC BLOOD PRESSURE: 124 MMHG

## 2019-11-07 DIAGNOSIS — S82.64XD CLOSED NONDISPLACED FRACTURE OF LATERAL MALLEOLUS OF RIGHT FIBULA WITH ROUTINE HEALING, SUBSEQUENT ENCOUNTER: Primary | ICD-10-CM

## 2019-11-07 DIAGNOSIS — S82.831D CLOSED FRACTURE OF DISTAL END OF RIGHT FIBULA WITH ROUTINE HEALING, UNSPECIFIED FRACTURE MORPHOLOGY, SUBSEQUENT ENCOUNTER: ICD-10-CM

## 2019-11-07 PROCEDURE — 99214 OFFICE O/P EST MOD 30 MIN: CPT | Mod: PBBFAC,25 | Performed by: NURSE PRACTITIONER

## 2019-11-07 PROCEDURE — 99213 PR OFFICE/OUTPT VISIT, EST, LEVL III, 20-29 MIN: ICD-10-PCS | Mod: S$PBB,,, | Performed by: NURSE PRACTITIONER

## 2019-11-07 PROCEDURE — 73610 X-RAY EXAM OF ANKLE: CPT | Mod: 26,RT,, | Performed by: RADIOLOGY

## 2019-11-07 PROCEDURE — 99999 PR PBB SHADOW E&M-EST. PATIENT-LVL IV: ICD-10-PCS | Mod: PBBFAC,,, | Performed by: NURSE PRACTITIONER

## 2019-11-07 PROCEDURE — 99999 PR PBB SHADOW E&M-EST. PATIENT-LVL IV: CPT | Mod: PBBFAC,,, | Performed by: NURSE PRACTITIONER

## 2019-11-07 PROCEDURE — 73610 X-RAY EXAM OF ANKLE: CPT | Mod: TC,RT

## 2019-11-07 PROCEDURE — 73610 XR ANKLE COMPLETE 3 VIEW RIGHT: ICD-10-PCS | Mod: 26,RT,, | Performed by: RADIOLOGY

## 2019-11-07 PROCEDURE — 99213 OFFICE O/P EST LOW 20 MIN: CPT | Mod: S$PBB,,, | Performed by: NURSE PRACTITIONER

## 2019-11-07 NOTE — PROGRESS NOTES
SUBJECTIVE:     Chief Complaint & History of Present Illness:  Nettie Villasenor is a Established 79 y.o. year old female patient here for follow regarding her right lateral malleolus fracture.  She was last seen by me on 10/11/19, at time she was placed in a walking boot and advised to follow up in 4 weeks.  Since then, she denies any trauma or falls.  She is walking in her boot using a walker at [place].  She reports no pain today.   There is not radiation, numbness or tingling into the toes.  Previous treatments include cam boot.  There is not a history of previous injury or surgery to the foot.   The patient does use an assistive device.     Review of patient's allergies indicates:  No Known Allergies      Current Outpatient Medications   Medication Sig Dispense Refill    ABILIFY 2 mg Tab Take 2 mg by mouth once daily.       acetaminophen (TYLENOL EXTRA STRENGTH) 500 MG tablet Take 1,000 mg by mouth every evening.      aspirin 81 MG Chew Take 81 mg by mouth once daily.      calcium carbonate (OS-OTTONIEL) 500 mg calcium (1,250 mg) tablet Take 1 tablet by mouth 2 (two) times daily.      docusate sodium (COLACE) 100 MG capsule Take 100 mg by mouth once daily.      fexofenadine (ALLEGRA) 180 MG tablet Take 180 mg by mouth daily as needed.      fluoxetine (PROZAC) 20 MG capsule Take 20 mg by mouth once daily.       lorazepam (ATIVAN) 0.5 MG tablet Take 0.5 mg by mouth every 12 (twelve) hours as needed.        multivitamin capsule Take 1 capsule by mouth once daily.      vitamin D 1000 units Tab Take 185 mg by mouth once daily.      alendronate (FOSAMAX) 70 MG tablet Take 1 tablet (70 mg total) by mouth every 7 days. 4 tablet 11     No current facility-administered medications for this visit.        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    Osteopenia 3/13/2013    Schizophrenia 3/13/2013    Thyroid nodule        Past  Surgical History:   Procedure Laterality Date    ORIF left lower leg      TONSILLECTOMY         Family History   Problem Relation Age of Onset    Dementia Mother     Heart attack Father     Melanoma Neg Hx     Breast cancer Neg Hx     Colon cancer Neg Hx     Ovarian cancer Neg Hx          Review of Systems:  ROS:  Constitutional: no fever or chills  Eyes: no visual changes  ENT: no nasal congestion or sore throat  Respiratory: no cough or shortness of breath  Cardiovascular: no chest pain or palpitations  Gastrointestinal: no nausea or vomiting, tolerating diet  Genitourinary: no hematuria or dysuria  Integument/Breast: no rash or pruritis  Hematologic/Lymphatic: no easy bruising or lymphadenopathy  Musculoskeletal: no arthralgias or myalgias  Neurological: no seizures or tremors  Behavioral/Psych: no auditory or visual hallucinations  Endocrine: no heat or cold intolerance      OBJECTIVE:     PHYSICAL EXAM:  Vital Signs (Most Recent)  Vitals:    11/07/19 0929   BP: 124/63   Pulse: 80   Resp: 18   Temp: 97.8 °F (36.6 °C)     Height: (nwb) Weight: (nwb),   General Appearance: Well nourished, well developed, in no acute distress.  HENT: Normal cephalic, oropharynx pink and moist  Eyes: PERRLA bilaterally and EOM x 4  Respiratory: Even and unlabored  Skin: Warm and Dry.   Psychiatric: AAO x 4, Mood & affect are normal.    right  Foot/Ankle    General appearance: no acute distress, alert/oriented x3, appropriate mood and affect, looks stated age and well nourished  The examination was performed out of splint/cast  Skin: normal  Swelling: mild to lateral right ankle  Warmth: no warmth  Tenderness: none  ROM: Normal  Strength: normal  Gait: antalgic  Stability: stable to testing and Cotton test: negative  Crepitus: no  Neurological Exam: normal  Vascular Exam: normal and pulse present    soft tissue swelling noted over the lateral ankle      RADIOGRAPHS:  X-ray of right ankle obtained and personally reviewed by  me.  Healing fracture to her lateral malleolus which is in good alignment.  She has soft tissue swelling.  Ankle mortise appears intact and no other fractures seen.    ASSESSMENT/PLAN:       ICD-10-CM ICD-9-CM   1. Closed nondisplaced fracture of lateral malleolus of right fibula with routine healing, subsequent encounter S82.64XD V54.19       Plan:  -Patient presents for follow up for her right ankle fracture which occurred around 10/4/19.  -X-ray as above.  -Exam is normal, she has no pain and has good ROM at ankle.  At this point will allow her to come out of the boot, recommend ankle support sleeve for activities for 2 weeks and then can resume normal activities thereafter.    -RICE therapy PRN.  -Tylenol PRN for pain control.  -Patient to follow up in 6 weeks PRN or sooner for new or worsening pain.

## 2019-12-19 RX ORDER — FLUOXETINE HYDROCHLORIDE 20 MG/1
CAPSULE ORAL
Qty: 90 CAPSULE | Refills: 4 | Status: SHIPPED | OUTPATIENT
Start: 2019-12-19 | End: 2020-01-29 | Stop reason: SDUPTHER

## 2019-12-19 RX ORDER — FLUOXETINE HYDROCHLORIDE 20 MG/1
20 CAPSULE ORAL DAILY
Qty: 30 CAPSULE | Refills: 2 | OUTPATIENT
Start: 2019-12-19

## 2019-12-19 NOTE — PROGRESS NOTES
Quick DC. Duplicate Request  Refill Authorization Note     is requesting a refill authorization.    Brief assessment and rationale for refill: QUICK DC: duplicate request   Name and strength of medication: FLUoxetine 20 MG capsule       Medication Therapy Plan: pended in previous encounter     Medication reconciliation completed: No                         Comments:   Last Prescribed Info:      Ordering Encounter Report     Associated Reports   View Encounter

## 2019-12-20 RX ORDER — FLUOXETINE HYDROCHLORIDE 20 MG/1
20 CAPSULE ORAL DAILY
Qty: 30 CAPSULE | Refills: 3 | OUTPATIENT
Start: 2019-12-20

## 2019-12-20 NOTE — PROGRESS NOTES
HISTORY OF PRESENT ILLNESS:    Nettie Villasenor is a 76 y.o. female , presents for a routine exam and has no complaints.      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 , Disp: , Rfl:     alendronate (FOSAMAX) 70 MG tablet, Take 1 tablet (70 mg total) by mouth every 7 days., Disp: 4 tablet, Rfl: 11    aspirin 81 MG Chew, Take 81 mg by mouth once daily., Disp: , Rfl:     calcium carbonate (OS-OTTONIEL) 500 mg calcium (1,250 mg) tablet, Take 1 tablet by mouth 2 (two) times daily., Disp: , Rfl:     fexofenadine (ALLEGRA) 180 MG tablet, Take 180 mg by mouth daily as needed., Disp: , Rfl:     fluoxetine (PROZAC) 20 MG capsule, Take 20 mg by mouth once daily. , Disp: , Rfl:     lorazepam (ATIVAN) 0.5 MG tablet, Take 0.5 mg by mouth every 12 (twelve) hours as needed.  , Disp: , Rfl:     multivitamin capsule, Take 1 capsule by mouth once daily., Disp: , Rfl:     vitamin D 1000 units Tab, Take 185 mg by mouth once daily., Disp: , Rfl:     Review of patient's allergies indicates:  No Known Allergies    Family History   Problem Relation Age of Onset    Dementia Mother     Heart attack Father     Melanoma Neg Hx     Breast cancer Neg Hx     Colon cancer Neg Hx     Ovarian cancer Neg Hx        Social History     Social History    Marital status: Single     Spouse name: N/A    Number of children: N/A    Years of education: N/A     Occupational History    Not on file.     Social History Main Topics    Smoking status: Never Smoker    Smokeless tobacco: Never Used    Alcohol use No    Drug use: No    Sexual activity: No     Other  Patient had event, called nurse to room.    "Topics Concern    Not on file     Social History Narrative    No narrative on file       OBSTETRIC HISTORY: None    COMPREHENSIVE GYN HISTORY: There is no chart history of:  Pap History: Denies abn Paps.  Infection History: Denies STDs. Denies PID.  Benign History: Reports uterine fibroids. Denies ovarian cysts. Denies endometriosis.   Cancer History: Denies Cervical Cancer. Denies Uterus Cancer. Denies Ovarian Cancer. Denies Vaginal Cancer. Denies Vulvar Cancer. Denies Breast Cancer. Denies Colon Cancer.  Sexual Activity History: Denies sexual activity.   Menstrual History: Denies menses. Patient is postmenopausal: Not on HRT/ERT.     ROS:  GENERAL: No weight changes. No swelling. No fatigue. No fever.  CARDIOVASCULAR: No chest pain. No shortness of breath. No leg cramps.   NEUROLOGICAL: No headaches. No vision changes.  BREASTS: No pain. No lumps. No discharge.  ABDOMEN: No pain. No nausea. No vomiting. No diarrhea. No constipation.  REPRODUCTIVE: No abnormal bleeding.   VULVA: No pain. No lesions. No itching.  VAGINA: No relaxation. No itching. No odor. No discharge. No lesions.   URINARY: No incontinence. No nocturia. No frequency. No dysuria.      /74   Ht 5' 3" (1.6 m)   Wt 63.5 kg (140 lb)   BMI 24.80 kg/m²     PE:  APPEARANCE: Well nourished, well developed, in no acute distress. USING A WALKER.  AFFECT: WNL, alert and oriented x 3.  SKIN: No hirsutism or acne.  NECK: Neck symmetric without masses or thyromegaly.  NODES: No inguinal, cervical, axillary or femoral lymph node enlargement.  CHEST: Good respiratory effort.   ABDOMEN: Soft. No tenderness or masses.   BREASTS: Symmetrical, no skin changes or visible lesions. No palpable masses, nipple discharge bilaterally.  PELVIC: ATROPHIC EXTERNAL FEMALE GENITALIA without lesions. Normal hair distribution. Adequate perineal body, normal urethral meatus. VAGINA DRY without lesions or discharge. CERVIX STENOTIC without lesions, discharge or tenderness. " No significant cystocele or rectocele. Bimanual exam shows uterus to be 8-10 week size, regular, mobile and nontender. Adnexa without masses or tenderness.  RECTAL: Rectovaginal exam confirms above with normal sphincter tone, no masses.  EXTREMITIES: No edema.    DIAGNOSIS:  1. Visit for gynecologic examination    2. Postmenopause        PLAN:    LABS AND TESTS ORDERED:  Up to date on mammogram, BMD,  Colon screening    MEDICATIONS PRESCRIBED:  none    COUNSELING:  The patient/Aide counseled today on:  -osteoporosis prevention, calcium supplementation, regular weight bearing exercise;  -A.C.S. Pap and pelvic exam guidelines (pap every 3 years, no pap after age 65) and recommendations for yearly mammogram;  -to see her PCP for other health maintenance.    FOLLOW-UP with me in two years.

## 2020-01-29 ENCOUNTER — OFFICE VISIT (OUTPATIENT)
Dept: PSYCHIATRY | Facility: CLINIC | Age: 80
End: 2020-01-29
Payer: MEDICARE

## 2020-01-29 DIAGNOSIS — F70 MILD INTELLECTUAL DISABILITY: ICD-10-CM

## 2020-01-29 DIAGNOSIS — F39 MOOD DISORDER: ICD-10-CM

## 2020-01-29 DIAGNOSIS — F41.9 ANXIETY: Primary | ICD-10-CM

## 2020-01-29 PROCEDURE — 3288F PR FALLS RISK ASSESSMENT DOCUMENTED: ICD-10-PCS | Mod: HCNC,CPTII,S$GLB, | Performed by: INTERNAL MEDICINE

## 2020-01-29 PROCEDURE — 1159F PR MEDICATION LIST DOCUMENTED IN MEDICAL RECORD: ICD-10-PCS | Mod: HCNC,S$GLB,, | Performed by: INTERNAL MEDICINE

## 2020-01-29 PROCEDURE — 99499 RISK ADDL DX/OHS AUDIT: ICD-10-PCS | Mod: HCNC,S$GLB,, | Performed by: INTERNAL MEDICINE

## 2020-01-29 PROCEDURE — 99999 PR PBB SHADOW E&M-EST. PATIENT-LVL II: ICD-10-PCS | Mod: PBBFAC,HCNC,, | Performed by: INTERNAL MEDICINE

## 2020-01-29 PROCEDURE — 99204 OFFICE O/P NEW MOD 45 MIN: CPT | Mod: HCNC,S$GLB,, | Performed by: INTERNAL MEDICINE

## 2020-01-29 PROCEDURE — 99999 PR PBB SHADOW E&M-EST. PATIENT-LVL II: CPT | Mod: PBBFAC,HCNC,, | Performed by: INTERNAL MEDICINE

## 2020-01-29 PROCEDURE — 3288F FALL RISK ASSESSMENT DOCD: CPT | Mod: HCNC,CPTII,S$GLB, | Performed by: INTERNAL MEDICINE

## 2020-01-29 PROCEDURE — 1100F PR PT FALLS ASSESS DOC 2+ FALLS/FALL W/INJURY/YR: ICD-10-PCS | Mod: HCNC,CPTII,S$GLB, | Performed by: INTERNAL MEDICINE

## 2020-01-29 PROCEDURE — 99204 PR OFFICE/OUTPT VISIT, NEW, LEVL IV, 45-59 MIN: ICD-10-PCS | Mod: HCNC,S$GLB,, | Performed by: INTERNAL MEDICINE

## 2020-01-29 PROCEDURE — 1100F PTFALLS ASSESS-DOCD GE2>/YR: CPT | Mod: HCNC,CPTII,S$GLB, | Performed by: INTERNAL MEDICINE

## 2020-01-29 PROCEDURE — 1159F MED LIST DOCD IN RCRD: CPT | Mod: HCNC,S$GLB,, | Performed by: INTERNAL MEDICINE

## 2020-01-29 PROCEDURE — 99499 UNLISTED E&M SERVICE: CPT | Mod: HCNC,S$GLB,, | Performed by: INTERNAL MEDICINE

## 2020-01-29 RX ORDER — FLUOXETINE HYDROCHLORIDE 20 MG/1
20 CAPSULE ORAL EVERY MORNING
Qty: 90 CAPSULE | Refills: 3 | Status: SHIPPED | OUTPATIENT
Start: 2020-01-29 | End: 2021-01-06

## 2020-01-29 RX ORDER — ARIPIPRAZOLE 2 MG/1
2 TABLET ORAL NIGHTLY
Qty: 90 TABLET | Refills: 3 | Status: SHIPPED | OUTPATIENT
Start: 2020-01-29 | End: 2021-01-06
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
## 2020-02-27 ENCOUNTER — TELEPHONE (OUTPATIENT)
Dept: INTERNAL MEDICINE | Facility: CLINIC | Age: 80
End: 2020-02-27

## 2020-02-27 DIAGNOSIS — Z12.11 SCREENING FOR MALIGNANT NEOPLASM OF COLON: Primary | ICD-10-CM

## 2020-03-02 DIAGNOSIS — Z12.11 SPECIAL SCREENING FOR MALIGNANT NEOPLASMS, COLON: Primary | ICD-10-CM

## 2020-03-02 RX ORDER — POLYETHYLENE GLYCOL 3350, SODIUM SULFATE ANHYDROUS, SODIUM BICARBONATE, SODIUM CHLORIDE, POTASSIUM CHLORIDE 236; 22.74; 6.74; 5.86; 2.97 G/4L; G/4L; G/4L; G/4L; G/4L
4 POWDER, FOR SOLUTION ORAL ONCE
Qty: 4000 ML | Refills: 0 | Status: SHIPPED | OUTPATIENT
Start: 2020-03-02 | End: 2020-03-02

## 2020-03-03 ENCOUNTER — PATIENT OUTREACH (OUTPATIENT)
Dept: ADMINISTRATIVE | Facility: OTHER | Age: 80
End: 2020-03-03

## 2020-03-04 ENCOUNTER — OFFICE VISIT (OUTPATIENT)
Dept: OTOLARYNGOLOGY | Facility: CLINIC | Age: 80
End: 2020-03-04
Payer: MEDICARE

## 2020-03-04 ENCOUNTER — CLINICAL SUPPORT (OUTPATIENT)
Dept: OTOLARYNGOLOGY | Facility: CLINIC | Age: 80
End: 2020-03-04
Payer: MEDICARE

## 2020-03-04 VITALS
HEART RATE: 87 BPM | BODY MASS INDEX: 29.48 KG/M2 | DIASTOLIC BLOOD PRESSURE: 72 MMHG | HEIGHT: 60 IN | TEMPERATURE: 99 F | WEIGHT: 150.13 LBS | SYSTOLIC BLOOD PRESSURE: 107 MMHG

## 2020-03-04 DIAGNOSIS — H61.21 IMPACTED CERUMEN OF RIGHT EAR: ICD-10-CM

## 2020-03-04 DIAGNOSIS — H90.3 BILATERAL SENSORINEURAL HEARING LOSS: ICD-10-CM

## 2020-03-04 DIAGNOSIS — R58 ECCHYMOSIS OF NECK: ICD-10-CM

## 2020-03-04 DIAGNOSIS — H90.3 BILATERAL SENSORINEURAL HEARING LOSS: Primary | ICD-10-CM

## 2020-03-04 PROCEDURE — 99204 OFFICE O/P NEW MOD 45 MIN: CPT | Mod: 25,S$GLB,, | Performed by: OTOLARYNGOLOGY

## 2020-03-04 PROCEDURE — 1100F PTFALLS ASSESS-DOCD GE2>/YR: CPT | Mod: CPTII,S$GLB,, | Performed by: OTOLARYNGOLOGY

## 2020-03-04 PROCEDURE — 92550 TYMPANOMETRY & REFLEX THRESH: CPT | Mod: S$GLB,,, | Performed by: AUDIOLOGIST-HEARING AID FITTER

## 2020-03-04 PROCEDURE — 69210 REMOVE IMPACTED EAR WAX UNI: CPT | Mod: S$GLB,,, | Performed by: OTOLARYNGOLOGY

## 2020-03-04 PROCEDURE — 3288F FALL RISK ASSESSMENT DOCD: CPT | Mod: CPTII,S$GLB,, | Performed by: OTOLARYNGOLOGY

## 2020-03-04 PROCEDURE — 1126F PR PAIN SEVERITY QUANTIFIED, NO PAIN PRESENT: ICD-10-PCS | Mod: S$GLB,,, | Performed by: OTOLARYNGOLOGY

## 2020-03-04 PROCEDURE — 99204 PR OFFICE/OUTPT VISIT, NEW, LEVL IV, 45-59 MIN: ICD-10-PCS | Mod: 25,S$GLB,, | Performed by: OTOLARYNGOLOGY

## 2020-03-04 PROCEDURE — 92550 PR TYMPANOMETRY AND REFLEX THRESHOLD MEASUREMENTS: ICD-10-PCS | Mod: S$GLB,,, | Performed by: AUDIOLOGIST-HEARING AID FITTER

## 2020-03-04 PROCEDURE — 1159F MED LIST DOCD IN RCRD: CPT | Mod: S$GLB,,, | Performed by: OTOLARYNGOLOGY

## 2020-03-04 PROCEDURE — 3288F PR FALLS RISK ASSESSMENT DOCUMENTED: ICD-10-PCS | Mod: CPTII,S$GLB,, | Performed by: OTOLARYNGOLOGY

## 2020-03-04 PROCEDURE — 92557 PR COMPREHENSIVE HEARING TEST: ICD-10-PCS | Mod: S$GLB,,, | Performed by: AUDIOLOGIST-HEARING AID FITTER

## 2020-03-04 PROCEDURE — 1159F PR MEDICATION LIST DOCUMENTED IN MEDICAL RECORD: ICD-10-PCS | Mod: S$GLB,,, | Performed by: OTOLARYNGOLOGY

## 2020-03-04 PROCEDURE — 92557 COMPREHENSIVE HEARING TEST: CPT | Mod: S$GLB,,, | Performed by: AUDIOLOGIST-HEARING AID FITTER

## 2020-03-04 PROCEDURE — 69210 EAR CERUMEN REMOVAL: ICD-10-PCS | Mod: S$GLB,,, | Performed by: OTOLARYNGOLOGY

## 2020-03-04 PROCEDURE — 1100F PR PT FALLS ASSESS DOC 2+ FALLS/FALL W/INJURY/YR: ICD-10-PCS | Mod: CPTII,S$GLB,, | Performed by: OTOLARYNGOLOGY

## 2020-03-04 PROCEDURE — 1126F AMNT PAIN NOTED NONE PRSNT: CPT | Mod: S$GLB,,, | Performed by: OTOLARYNGOLOGY

## 2020-03-04 RX ORDER — ALENDRONATE SODIUM 70 MG/1
70 TABLET ORAL
COMMUNITY
Start: 2020-02-28 | End: 2021-06-21

## 2020-03-04 NOTE — PATIENT INSTRUCTIONS
Hearing aid trial if able to tolerate.      Follow up in one year with audiogram unless change or problems prior.

## 2020-03-04 NOTE — PROGRESS NOTES
Subjective:       Patient ID: Nettie Villasenor is a 79 y.o. female.    Chief Complaint: Other (Check ears and hearing)    She is a new patient for me here today to have her ears and hearing checked.  She is a [facility] resident accompanied by a [facility] attendant.  She reports history of abscessed ears as a teenager with no surgery or subsequent ear problems.  Then began to note some difficulty hearing gradually over the past several months or more as well as occasional non localized tinnitus.  No acoustical trauma or head trauma.  No otalgia or otorrhea.  No associated URI or nasal symptoms.  No other otolaryngologic complaints.  Medical history includes history of schizophrenia, mood disorder, anxiety, mild intellectual disability.          Review of Systems   Ears: Positive for hearing loss.  Negative for ear pain, ear pressure, ear discharge, ear infections, dizziness, head trauma, taken gentramycin/streptomycin and family history of hearing loss.    Nose:  Negative for nosebleeds, nasal obstruction, nasal or sinus surgery, loss of smell, postnasal drip and snoring.    Mouth/Throat: Negative for pain swallowing, impaired swallowing, hoarse voice, throat mass, neck mass and oral ulcers.   Constitutional: Negative for recent unexplained weight loss and fever.    Eyes:  Negative for history of glaucoma and visual change.   Cardiovascular:  Negative for chest pain, palpitations and history of high blood pressure.   Respiratory:  Negative for asthma, emphysema, history of tuberculosis, recent cough and shortness of breath.    Gastrointestinal:  Negative for history of stomach ulcers or pain, acid reflux, indigestion and blood in stool.   Other:  Negative for kidney problem, bladder problem, arthritis, weakness, disturbances in coordination, slurred, swollen glands, anemia and persistent infections.           Objective:        Vitals:    03/04/20 1419   BP: 107/72   Pulse: 87   Temp: 98.6 °F (37 °C)     Body  mass index is 29.31 kg/m².  Physical Exam   Constitutional: She is oriented to person, place, and time. She appears well-developed and well-nourished. No distress.   HENT:   Head: Normocephalic and atraumatic.   Right Ear: Tympanic membrane and external ear normal.   Left Ear: Tympanic membrane, external ear and ear canal normal.   Nose: Septal deviation present. No mucosal edema, rhinorrhea or nasal deformity. No epistaxis.   Mouth/Throat: Oropharynx is clear and moist and mucous membranes are normal. No oral lesions. No uvula swelling. No oropharyngeal exudate, posterior oropharyngeal edema or posterior oropharyngeal erythema.   Right cerumen impaction.  See procedure note.  Sensitive gag and resists oral exam, but negative as visualized.       Neck: Phonation normal.   Clearing ecchymosis left lateral neck with no swelling or tenderness.  States fell about 10 days ago walking in slippers.  Also small bruise dorsum of left hand.      Pulmonary/Chest: Effort normal. No respiratory distress.   Lymphadenopathy:     She has no cervical adenopathy.   Neurological: She is alert and oriented to person, place, and time. She displays no weakness.   Skin: Skin is warm and dry.   Psychiatric: Her speech is not slurred.       Tests / Results:        Reviewed above audiogram which reveals moderate sensorineural hearing loss bilaterally with reduced speech discrimination AU.        Assessment:       1. Impacted cerumen of right ear    2. Bilateral sensorineural hearing loss    3. Ecchymosis of neck        Plan:       Ear cleaned as per procedure note.  See procedure note.    Reviewed above moderate SN hearing loss and hearing aid trial if able.  Recheck in 1 year with repeat audiogram unless subjective change or problems prior.

## 2020-03-04 NOTE — LETTER
March 4, 2020      Tram Victoria AU.D  [address]  [facility] [address]           [facility] [place] [address]  [address]  [facility] [address]  Phone: [phone]  Fax: [phone]          Patient: Nettie Villasenor   MR Number: [id number]   YOB: 1940   Date of Visit: 3/4/2020       Dear Tram Victoria:    Thank you for referring Nettie Villasenor to me for evaluation. Attached you will find relevant portions of my assessment and plan of care.    If you have questions, please do not hesitate to call me. I look forward to following Nettie Villasenor along with you.    Sincerely,    Mile Gates MD    Enclosure  CC:  No Recipients    If you would like to receive this communication electronically, please contact [email or web address] or [phone] to request more information on [employer] Link access.    For providers and/or their staff who would like to refer a patient to [facility], please contact us through our one-stop-shop provider referral line, [facility], at [phone].    If you feel you have received this communication in error or would no longer like to receive these types of communications, please e-mail [email or web address]

## 2020-03-05 NOTE — PROCEDURES
Ear Cerumen Removal  Date/Time: 3/4/2020 2:30 PM  Performed by: Mile Gates MD  Authorized by: Mile Gates MD     Consent Done?:  Yes (Verbal)  Location details:  Right ear  Procedure type: curette    Cerumen  Removal Results:  Cerumen completely removed  Patient tolerance:  Patient tolerated the procedure well with no immediate complications

## 2020-03-27 NOTE — PROGRESS NOTES
I certify that I was present for the audiological evaluation performed by the 2nd year [employer] student.  I agree with her findings and interpretation.

## 2020-04-10 ENCOUNTER — TELEPHONE (OUTPATIENT)
Dept: ENDOSCOPY | Facility: HOSPITAL | Age: 80
End: 2020-04-10

## 2020-04-10 DIAGNOSIS — D50.9 IRON DEFICIENCY ANEMIA, UNSPECIFIED IRON DEFICIENCY ANEMIA TYPE: Primary | ICD-10-CM

## 2020-04-10 DIAGNOSIS — D53.9 MACROCYTIC ANEMIA: ICD-10-CM

## 2020-04-20 ENCOUNTER — TELEPHONE (OUTPATIENT)
Dept: GASTROENTEROLOGY | Facility: CLINIC | Age: 80
End: 2020-04-20

## 2020-05-14 ENCOUNTER — TELEPHONE (OUTPATIENT)
Dept: ENDOSCOPY | Facility: HOSPITAL | Age: 80
End: 2020-05-14

## 2020-05-14 NOTE — TELEPHONE ENCOUNTER
Placed call to appointment desk for colonoscopy re-schedule. Voicemail message with call back number provided.

## 2020-05-22 ENCOUNTER — TELEPHONE (OUTPATIENT)
Dept: INTERNAL MEDICINE | Facility: CLINIC | Age: 80
End: 2020-05-22

## 2020-06-01 ENCOUNTER — DOCUMENTATION ONLY (OUTPATIENT)
Dept: INTERNAL MEDICINE | Facility: CLINIC | Age: 80
End: 2020-06-01

## 2020-06-01 ENCOUNTER — OFFICE VISIT (OUTPATIENT)
Dept: INTERNAL MEDICINE | Facility: CLINIC | Age: 80
End: 2020-06-01
Payer: MEDICARE

## 2020-06-01 VITALS
DIASTOLIC BLOOD PRESSURE: 60 MMHG | HEIGHT: 60 IN | HEART RATE: 84 BPM | OXYGEN SATURATION: 97 % | BODY MASS INDEX: 29.64 KG/M2 | WEIGHT: 151 LBS | SYSTOLIC BLOOD PRESSURE: 112 MMHG

## 2020-06-01 DIAGNOSIS — Z00.00 ROUTINE GENERAL MEDICAL EXAMINATION AT A HEALTH CARE FACILITY: ICD-10-CM

## 2020-06-01 DIAGNOSIS — F39 MOOD DISORDER: ICD-10-CM

## 2020-06-01 DIAGNOSIS — M85.80 OSTEOPENIA, UNSPECIFIED LOCATION: ICD-10-CM

## 2020-06-01 DIAGNOSIS — F41.9 ANXIETY: ICD-10-CM

## 2020-06-01 DIAGNOSIS — F20.89 OTHER SCHIZOPHRENIA: ICD-10-CM

## 2020-06-01 DIAGNOSIS — F70 MILD INTELLECTUAL DISABILITY: ICD-10-CM

## 2020-06-01 DIAGNOSIS — Z12.31 SCREENING MAMMOGRAM, ENCOUNTER FOR: ICD-10-CM

## 2020-06-01 DIAGNOSIS — E04.1 THYROID NODULE: ICD-10-CM

## 2020-06-01 DIAGNOSIS — Z12.11 SCREENING FOR COLON CANCER: Primary | ICD-10-CM

## 2020-06-01 PROCEDURE — 99499 UNLISTED E&M SERVICE: CPT | Mod: HCNC,S$GLB,, | Performed by: INTERNAL MEDICINE

## 2020-06-01 PROCEDURE — 99499 RISK ADDL DX/OHS AUDIT: ICD-10-PCS | Mod: HCNC,S$GLB,, | Performed by: INTERNAL MEDICINE

## 2020-06-01 PROCEDURE — 99397 PR PREVENTIVE VISIT,EST,65 & OVER: ICD-10-PCS | Mod: HCNC,S$GLB,, | Performed by: INTERNAL MEDICINE

## 2020-06-01 PROCEDURE — 99999 PR PBB SHADOW E&M-EST. PATIENT-LVL II: CPT | Mod: PBBFAC,HCNC,, | Performed by: INTERNAL MEDICINE

## 2020-06-01 PROCEDURE — 99999 PR PBB SHADOW E&M-EST. PATIENT-LVL II: ICD-10-PCS | Mod: PBBFAC,HCNC,, | Performed by: INTERNAL MEDICINE

## 2020-06-01 PROCEDURE — 99397 PER PM REEVAL EST PAT 65+ YR: CPT | Mod: HCNC,S$GLB,, | Performed by: INTERNAL MEDICINE

## 2020-06-01 NOTE — PROGRESS NOTES
CHIEF COMPLAINT: Annual exam and Follow up of osteopenia, mild mentally handicapped, thyroid nodule and schizophrenia.     HPI: This is a 79 year old woman from [place] who presents with her , meena, followup of above. All history is obtained from meena due to medical problems.    Nettie is now walking with a walker. She states her feet go numb when she walks a long distance.  She walks with her neck flexed - has been like this for a very long time. She has done physical therapy in the past.  She slipped trying to get out of bed one morning. She cut her right arm which has since healed. She now has better slippers and has not had any falls. She has slight knee pain which comes and goes. She is taking tylenol in the evening which is helping.     Nettie is on alendronate 70 mg once weekly for her bones. continues to take Tums twice a day and multivitamin for her history of osteopenia. She had osteopenia on bone mineral density in 2004. Her bone density in 2007 was improved. BMD 4/15 did not suggest treatment. She denies any constipation from the calcium supplement. She has been happy.     Her anxiety has been controlled on Prozac 20 mg daily and abilify 2 mg daily. No depression. Sleeping well. She denies hallucinations or insomnia. She continues to like to live in her group home at [place]. Stable thyroid ultrasound 4/17. She denies any neck pain. She has been working at diet and exercise at [employer]. Weight is stable. Energy level is good. .        PAST MEDICAL HISTORY:   1. Mild mentally handicapped.   2. Schizophrenia.   3. A thyroid nodule, status post fine needle aspirate on 05/15/2003 , 09/2003 and 12/2006 (negative). Ultrasound in 2011 was stable from 2006  4. History of chest pain with a negative stress echocardiogram in February 2003.   5. Osteopenia on bone mineral density June 2004, improved in bone density 4/07. No treatmetn 4/2015    PAST SURGICAL HISTORY: Tonsillectomy and  status post ORIF of the left leg.    MEDICATIONS: updated on epic    No known drug allergies.     SOCIAL HISTORY: No alcohol or tobacco. Resident of [email or web address].    FAMILY HISTORY: Her father  in his late 30s of a heart attack. Her mother  of Alzheimer's. A brother  in an explosion.     REVIEW OF SYSTEMS: She denies fevers, chills, night sweats, fatigue, visual changes, hearing loss, shortness of breath, chest pain, palpitations, nausea, vomiting, constipation, or diarrhea. She has a bowel movement once a day. She denies dysuria hematuria, polydipsia, polyuria, anemia, seizures, or numbness, joint pain or muscle pain    PHYSICAL EXAM:   /60   Pulse 84   Ht 5' (1.524 m)   Wt 68.5 kg (151 lb 0.2 oz)   SpO2 97%   BMI 29.49 kg/m²       GENERAL: She is alert, oriented, in no apparent distress.   Affect within normal limits. Conjunctivae anicteric. Pupils equal, round, and reactive   to light. EOMI. Tympanic membranes clear. Oropharynx clear.   NECK: Supple. No cervical lymphadenopathy. Left lobe of the thyroid was enlarged.. No JVD.   RESPIRATORY: Effort normal.   LUNGS: Clear to auscultation.   HEART: Regular rate and rhythm without murmurs, gallops, or rubs. No lower extremity edema.   ABDOMEN: Soft, nondistended, and nontender. Bowel sounds present. No hepatosplenomegaly  Breasts: No abnormalities seen, No nodules palpated. No axillary lympadenopathy.   Walking with shuffling gait. Neck flexed  Feet with 2+ dorsalis pedis pulses. No caluses, erythema or warmth.    ASSESSMENT AND PLAN:   Annual exam - discussed diet, exercise and safety issues.    1. Mild mentally handicapped, resident of [email or web address]. A 90L Form filled out.   2. Schizophrenia, doing well followed by Dr. Hanna.   3. thyroid nodule. Stable thyroid ultrasound   4. Osteopenia:BMD  - stable. Back on alendronate since .   5. Screening. MMG  ordered.  She had a normal colonscopy May 2009 -[employer] was given to  patient on 6/1/2020 4:37 PM   6. I will check a CBC, CMP, TSH, and fasting lipids, vitamin D level due to the above problems.   7.  LEft knee pain - stable with tylenol  8. 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   Prevnar 4/16

## 2020-06-17 ENCOUNTER — LAB VISIT (OUTPATIENT)
Dept: LAB | Facility: HOSPITAL | Age: 80
End: 2020-06-17
Attending: INTERNAL MEDICINE
Payer: MEDICARE

## 2020-06-17 DIAGNOSIS — Z12.11 SCREENING FOR COLON CANCER: ICD-10-CM

## 2020-06-17 PROCEDURE — 82274 ASSAY TEST FOR BLOOD FECAL: CPT | Mod: HCNC

## 2020-06-30 LAB — HEMOCCULT STL QL IA: NEGATIVE

## 2020-07-02 ENCOUNTER — LAB VISIT (OUTPATIENT)
Dept: LAB | Facility: HOSPITAL | Age: 80
End: 2020-07-02
Attending: INTERNAL MEDICINE
Payer: MEDICARE

## 2020-07-02 DIAGNOSIS — Z79.899 ENCOUNTER FOR LONG-TERM (CURRENT) USE OF OTHER MEDICATIONS: ICD-10-CM

## 2020-07-02 DIAGNOSIS — N18.9 CHRONIC KIDNEY DISEASE, UNSPECIFIED: Primary | ICD-10-CM

## 2020-07-02 DIAGNOSIS — E53.8 BIOTIN-(PROPIONYL-COA-CARBOXYLASE) LIGASE DEFICIENCY: ICD-10-CM

## 2020-07-02 LAB
25(OH)D3+25(OH)D2 SERPL-MCNC: 45 NG/ML (ref 30–96)
ALBUMIN SERPL BCP-MCNC: 3.4 G/DL (ref 3.5–5.2)
ALP SERPL-CCNC: 53 U/L (ref 55–135)
ALT SERPL W/O P-5'-P-CCNC: 13 U/L (ref 10–44)
ANION GAP SERPL CALC-SCNC: 7 MMOL/L (ref 8–16)
AST SERPL-CCNC: 21 U/L (ref 10–40)
BASOPHILS # BLD AUTO: 0.02 K/UL (ref 0–0.2)
BASOPHILS NFR BLD: 0.5 % (ref 0–1.9)
BILIRUB SERPL-MCNC: 0.3 MG/DL (ref 0.1–1)
BUN SERPL-MCNC: 27 MG/DL (ref 8–23)
CALCIUM SERPL-MCNC: 8.9 MG/DL (ref 8.7–10.5)
CHLORIDE SERPL-SCNC: 107 MMOL/L (ref 95–110)
CHOLEST SERPL-MCNC: 155 MG/DL (ref 120–199)
CHOLEST/HDLC SERPL: 3.6 {RATIO} (ref 2–5)
CO2 SERPL-SCNC: 26 MMOL/L (ref 23–29)
CREAT SERPL-MCNC: 0.8 MG/DL (ref 0.5–1.4)
DIFFERENTIAL METHOD: ABNORMAL
EOSINOPHIL # BLD AUTO: 0.2 K/UL (ref 0–0.5)
EOSINOPHIL NFR BLD: 3.7 % (ref 0–8)
ERYTHROCYTE [DISTWIDTH] IN BLOOD BY AUTOMATED COUNT: 12.9 % (ref 11.5–14.5)
EST. GFR  (AFRICAN AMERICAN): >60 ML/MIN/1.73 M^2
EST. GFR  (NON AFRICAN AMERICAN): >60 ML/MIN/1.73 M^2
FERRITIN SERPL-MCNC: 86 NG/ML (ref 20–300)
GLUCOSE SERPL-MCNC: 87 MG/DL (ref 70–110)
HCT VFR BLD AUTO: 31.3 % (ref 37–48.5)
HDLC SERPL-MCNC: 43 MG/DL (ref 40–75)
HDLC SERPL: 27.7 % (ref 20–50)
HGB BLD-MCNC: 10 G/DL (ref 12–16)
IMM GRANULOCYTES # BLD AUTO: 0.01 K/UL (ref 0–0.04)
IMM GRANULOCYTES NFR BLD AUTO: 0.2 % (ref 0–0.5)
IRON SERPL-MCNC: 73 UG/DL (ref 30–160)
LDLC SERPL CALC-MCNC: 96.6 MG/DL (ref 63–159)
LYMPHOCYTES # BLD AUTO: 1.8 K/UL (ref 1–4.8)
LYMPHOCYTES NFR BLD: 45.2 % (ref 18–48)
MCH RBC QN AUTO: 33.3 PG (ref 27–31)
MCHC RBC AUTO-ENTMCNC: 31.9 G/DL (ref 32–36)
MCV RBC AUTO: 104 FL (ref 82–98)
MONOCYTES # BLD AUTO: 0.3 K/UL (ref 0.3–1)
MONOCYTES NFR BLD: 8.1 % (ref 4–15)
NEUTROPHILS # BLD AUTO: 1.7 K/UL (ref 1.8–7.7)
NEUTROPHILS NFR BLD: 42.3 % (ref 38–73)
NONHDLC SERPL-MCNC: 112 MG/DL
NRBC BLD-RTO: 0 /100 WBC
PLATELET # BLD AUTO: 187 K/UL (ref 150–350)
PMV BLD AUTO: 13.5 FL (ref 9.2–12.9)
POTASSIUM SERPL-SCNC: 4.5 MMOL/L (ref 3.5–5.1)
PROT SERPL-MCNC: 6.5 G/DL (ref 6–8.4)
RBC # BLD AUTO: 3 M/UL (ref 4–5.4)
SATURATED IRON: 28 % (ref 20–50)
SODIUM SERPL-SCNC: 140 MMOL/L (ref 136–145)
T4 FREE SERPL-MCNC: 0.92 NG/DL (ref 0.71–1.51)
TOTAL IRON BINDING CAPACITY: 265 UG/DL (ref 250–450)
TRANSFERRIN SERPL-MCNC: 179 MG/DL (ref 200–375)
TRIGL SERPL-MCNC: 77 MG/DL (ref 30–150)
TSH SERPL DL<=0.005 MIU/L-ACNC: 0.39 UIU/ML (ref 0.4–4)
VIT B12 SERPL-MCNC: 407 PG/ML (ref 210–950)
WBC # BLD AUTO: 4.05 K/UL (ref 3.9–12.7)

## 2020-07-02 PROCEDURE — 82306 VITAMIN D 25 HYDROXY: CPT | Mod: HCNC

## 2020-07-02 PROCEDURE — 36415 COLL VENOUS BLD VENIPUNCTURE: CPT | Mod: HCNC

## 2020-07-02 PROCEDURE — 82728 ASSAY OF FERRITIN: CPT | Mod: HCNC

## 2020-07-02 PROCEDURE — 83540 ASSAY OF IRON: CPT | Mod: HCNC

## 2020-07-02 PROCEDURE — 80061 LIPID PANEL: CPT | Mod: HCNC

## 2020-07-02 PROCEDURE — 80053 COMPREHEN METABOLIC PANEL: CPT | Mod: HCNC

## 2020-07-02 PROCEDURE — 84439 ASSAY OF FREE THYROXINE: CPT | Mod: HCNC

## 2020-07-02 PROCEDURE — 82607 VITAMIN B-12: CPT | Mod: HCNC

## 2020-07-02 PROCEDURE — 85025 COMPLETE CBC W/AUTO DIFF WBC: CPT | Mod: HCNC

## 2020-07-02 PROCEDURE — 84443 ASSAY THYROID STIM HORMONE: CPT | Mod: HCNC

## 2020-07-07 RX ORDER — ASPIRIN 81 MG/1
TABLET ORAL
Qty: 30 TABLET | Refills: 11 | Status: SHIPPED | OUTPATIENT
Start: 2020-07-07

## 2020-07-07 NOTE — PROGRESS NOTES
Refill Routing Note   Medication(s) are not appropriate for processing by [facility]:       - Outside of protocol           Medication reconciliation completed: No      Automatic Epic Generated Protocol Data:    Requested Prescriptions   Pending Prescriptions Disp Refills    aspirin (ECOTRIN) 81 MG EC tablet [Pharmacy Med Name: aspirin 81 mg tablet,delayed release] 30 tablet 3     Sig: TAKE 1 TABLET BY MOUTH daily TO PREVENT CLOTS       There is no refill protocol information for this order           Appointments  past 12m or future 3m with PCP    Date Provider   Last Visit   6/1/2020 Carolina Chavez MD   Next Visit   Visit date not found Carolina Chavez MD   ED visits in past 90 days: 0     Note composed:12:53 PM 07/07/2020

## 2020-07-14 ENCOUNTER — HOSPITAL ENCOUNTER (OUTPATIENT)
Dept: RADIOLOGY | Facility: HOSPITAL | Age: 80
Discharge: HOME | End: 2020-07-14
Attending: INTERNAL MEDICINE
Payer: MEDICARE

## 2020-07-14 DIAGNOSIS — Z12.31 SCREENING MAMMOGRAM, ENCOUNTER FOR: ICD-10-CM

## 2020-07-14 PROCEDURE — 77067 SCR MAMMO BI INCL CAD: CPT | Mod: 26,HCNC,, | Performed by: RADIOLOGY

## 2020-07-14 PROCEDURE — 77067 MAMMO DIGITAL SCREENING BILAT WITH CAD: ICD-10-PCS | Mod: 26,HCNC,, | Performed by: RADIOLOGY

## 2020-07-14 PROCEDURE — 77067 SCR MAMMO BI INCL CAD: CPT | Mod: TC,HCNC

## 2020-07-17 ENCOUNTER — TELEPHONE (OUTPATIENT)
Dept: RADIOLOGY | Facility: HOSPITAL | Age: 80
End: 2020-07-17

## 2020-07-20 ENCOUNTER — TELEPHONE (OUTPATIENT)
Dept: RADIOLOGY | Facility: HOSPITAL | Age: 80
End: 2020-07-20

## 2020-07-24 ENCOUNTER — HOSPITAL ENCOUNTER (OUTPATIENT)
Dept: RADIOLOGY | Facility: HOSPITAL | Age: 80
Discharge: HOME | End: 2020-07-24
Attending: INTERNAL MEDICINE
Payer: MEDICARE

## 2020-07-24 DIAGNOSIS — R92.8 ABNORMAL MAMMOGRAM: ICD-10-CM

## 2020-07-24 PROCEDURE — 77065 DX MAMMO INCL CAD UNI: CPT | Mod: TC,HCNC,PO,LT

## 2020-07-24 PROCEDURE — 77061 BREAST TOMOSYNTHESIS UNI: CPT | Mod: 26,HCNC,LT, | Performed by: RADIOLOGY

## 2020-07-24 PROCEDURE — 76642 ULTRASOUND BREAST LIMITED: CPT | Mod: 26,HCNC,LT, | Performed by: RADIOLOGY

## 2020-07-24 PROCEDURE — 77065 MAMMO DIGITAL DIAGNOSTIC LEFT WITH TOMOSYNTHESIS_CAD: ICD-10-PCS | Mod: 26,HCNC,LT, | Performed by: RADIOLOGY

## 2020-07-24 PROCEDURE — 76642 US BREAST LEFT LIMITED: ICD-10-PCS | Mod: 26,HCNC,LT, | Performed by: RADIOLOGY

## 2020-07-24 PROCEDURE — 77061 BREAST TOMOSYNTHESIS UNI: CPT | Mod: TC,HCNC,PO,LT

## 2020-07-24 PROCEDURE — 77065 DX MAMMO INCL CAD UNI: CPT | Mod: 26,HCNC,LT, | Performed by: RADIOLOGY

## 2020-07-24 PROCEDURE — 77061 MAMMO DIGITAL DIAGNOSTIC LEFT WITH TOMOSYNTHESIS_CAD: ICD-10-PCS | Mod: 26,HCNC,LT, | Performed by: RADIOLOGY

## 2020-07-24 PROCEDURE — 76642 ULTRASOUND BREAST LIMITED: CPT | Mod: TC,HCNC,PO,LT

## 2020-07-31 ENCOUNTER — LAB VISIT (OUTPATIENT)
Dept: LAB | Facility: OTHER | Age: 80
End: 2020-07-31
Payer: MEDICARE

## 2020-07-31 DIAGNOSIS — Z20.822 SUSPECTED COVID-19 VIRUS INFECTION: ICD-10-CM

## 2020-07-31 DIAGNOSIS — Z03.818 ENCOUNTER FOR OBSERVATION FOR SUSPECTED EXPOSURE TO OTHER BIOLOGICAL AGENTS RULED OUT: ICD-10-CM

## 2020-07-31 PROCEDURE — U0003 INFECTIOUS AGENT DETECTION BY NUCLEIC ACID (DNA OR RNA); SEVERE ACUTE RESPIRATORY SYNDROME CORONAVIRUS 2 (SARS-COV-2) (CORONAVIRUS DISEASE [COVID-19]), AMPLIFIED PROBE TECHNIQUE, MAKING USE OF HIGH THROUGHPUT TECHNOLOGIES AS DESCRIBED BY CMS-2020-01-R: HCPCS | Mod: HCNC

## 2020-08-01 LAB — SARS-COV-2 RNA RESP QL NAA+PROBE: NOT DETECTED

## 2020-08-06 ENCOUNTER — HOSPITAL ENCOUNTER (OUTPATIENT)
Dept: RADIOLOGY | Facility: HOSPITAL | Age: 80
Discharge: HOME | End: 2020-08-06
Attending: INTERNAL MEDICINE
Payer: MEDICARE

## 2020-08-06 DIAGNOSIS — R92.8 ABNORMAL MAMMOGRAM: ICD-10-CM

## 2020-08-06 PROCEDURE — 77065 DX MAMMO INCL CAD UNI: CPT | Mod: 26,HCNC,LT, | Performed by: RADIOLOGY

## 2020-08-06 PROCEDURE — 88360 TUMOR IMMUNOHISTOCHEM/MANUAL: CPT | Mod: HCNC | Performed by: PATHOLOGY

## 2020-08-06 PROCEDURE — 77065 MAMMO DIGITAL DIAGNOSTIC LEFT WITH CAD: ICD-10-PCS | Mod: 26,HCNC,LT, | Performed by: RADIOLOGY

## 2020-08-06 PROCEDURE — 25000003 PHARM REV CODE 250: Mod: HCNC,PO | Performed by: INTERNAL MEDICINE

## 2020-08-06 PROCEDURE — 88360 TUMOR IMMUNOHISTOCHEM/MANUAL: CPT | Mod: 26,HCNC,, | Performed by: PATHOLOGY

## 2020-08-06 PROCEDURE — 88342 IMHCHEM/IMCYTCHM 1ST ANTB: CPT | Mod: 26,HCNC,59, | Performed by: PATHOLOGY

## 2020-08-06 PROCEDURE — 88341 PR IHC OR ICC EACH ADD'L SINGLE ANTIBODY  STAINPR: ICD-10-PCS | Mod: 26,HCNC,59, | Performed by: PATHOLOGY

## 2020-08-06 PROCEDURE — 88305 TISSUE EXAM BY PATHOLOGIST: CPT | Mod: HCNC | Performed by: PATHOLOGY

## 2020-08-06 PROCEDURE — 88305 TISSUE EXAM BY PATHOLOGIST: ICD-10-PCS | Mod: 26,HCNC,, | Performed by: PATHOLOGY

## 2020-08-06 PROCEDURE — 77065 DX MAMMO INCL CAD UNI: CPT | Mod: TC,HCNC,PO,LT

## 2020-08-06 PROCEDURE — 27201068 US BREAST BIOPSY WITH IMAGING 1ST SITE LEFT: Mod: HCNC,PO

## 2020-08-06 PROCEDURE — 19083 BX BREAST 1ST LESION US IMAG: CPT | Mod: HCNC,LT,, | Performed by: RADIOLOGY

## 2020-08-06 PROCEDURE — 88342 CHG IMMUNOCYTOCHEMISTRY: ICD-10-PCS | Mod: 26,HCNC,59, | Performed by: PATHOLOGY

## 2020-08-06 PROCEDURE — 88360 PR  TUMOR IMMUNOHISTOCHEM/MANUAL: ICD-10-PCS | Mod: 26,HCNC,, | Performed by: PATHOLOGY

## 2020-08-06 PROCEDURE — 88341 IMHCHEM/IMCYTCHM EA ADD ANTB: CPT | Mod: 26,HCNC,59, | Performed by: PATHOLOGY

## 2020-08-06 PROCEDURE — 88305 TISSUE EXAM BY PATHOLOGIST: CPT | Mod: 26,HCNC,, | Performed by: PATHOLOGY

## 2020-08-06 PROCEDURE — 19083 US BREAST BIOPSY WITH IMAGING 1ST SITE LEFT: ICD-10-PCS | Mod: HCNC,LT,, | Performed by: RADIOLOGY

## 2020-08-06 PROCEDURE — 88342 IMHCHEM/IMCYTCHM 1ST ANTB: CPT | Mod: HCNC | Performed by: PATHOLOGY

## 2020-08-06 RX ORDER — LIDOCAINE HYDROCHLORIDE 10 MG/ML
1 INJECTION, SOLUTION EPIDURAL; INFILTRATION; INTRACAUDAL; PERINEURAL ONCE
Status: COMPLETED | OUTPATIENT
Start: 2020-08-06 | End: 2020-08-06

## 2020-08-06 RX ORDER — LIDOCAINE HYDROCHLORIDE AND EPINEPHRINE 20; 10 MG/ML; UG/ML
10 INJECTION, SOLUTION INFILTRATION; PERINEURAL ONCE
Status: COMPLETED | OUTPATIENT
Start: 2020-08-06 | End: 2020-08-06

## 2020-08-06 RX ADMIN — LIDOCAINE HYDROCHLORIDE 1 ML: 10 INJECTION, SOLUTION EPIDURAL; INFILTRATION; INTRACAUDAL; PERINEURAL at 01:08

## 2020-08-06 RX ADMIN — LIDOCAINE HYDROCHLORIDE,EPINEPHRINE BITARTRATE 10 ML: 20; .01 INJECTION, SOLUTION INFILTRATION; PERINEURAL at 01:08

## 2020-08-07 ENCOUNTER — LAB VISIT (OUTPATIENT)
Dept: LAB | Facility: OTHER | Age: 80
End: 2020-08-07
Attending: INTERNAL MEDICINE
Payer: MEDICARE

## 2020-08-07 DIAGNOSIS — Z03.818 ENCOUNTER FOR OBSERVATION FOR SUSPECTED EXPOSURE TO OTHER BIOLOGICAL AGENTS RULED OUT: ICD-10-CM

## 2020-08-07 PROCEDURE — U0003 INFECTIOUS AGENT DETECTION BY NUCLEIC ACID (DNA OR RNA); SEVERE ACUTE RESPIRATORY SYNDROME CORONAVIRUS 2 (SARS-COV-2) (CORONAVIRUS DISEASE [COVID-19]), AMPLIFIED PROBE TECHNIQUE, MAKING USE OF HIGH THROUGHPUT TECHNOLOGIES AS DESCRIBED BY CMS-2020-01-R: HCPCS | Mod: HCNC

## 2020-08-10 ENCOUNTER — TELEPHONE (OUTPATIENT)
Dept: INTERNAL MEDICINE | Facility: CLINIC | Age: 80
End: 2020-08-10

## 2020-08-10 DIAGNOSIS — C50.912 MALIGNANT NEOPLASM OF LEFT BREAST IN FEMALE, ESTROGEN RECEPTOR POSITIVE, UNSPECIFIED SITE OF BREAST: Primary | ICD-10-CM

## 2020-08-10 DIAGNOSIS — Z17.0 MALIGNANT NEOPLASM OF LEFT BREAST IN FEMALE, ESTROGEN RECEPTOR POSITIVE, UNSPECIFIED SITE OF BREAST: Primary | ICD-10-CM

## 2020-08-10 LAB
FINAL PATHOLOGIC DIAGNOSIS: NORMAL
GROSS: NORMAL
Lab: NORMAL
MICROSCOPIC EXAM: NORMAL

## 2020-08-11 ENCOUNTER — TELEPHONE (OUTPATIENT)
Dept: SURGERY | Facility: CLINIC | Age: 80
End: 2020-08-11

## 2020-08-11 NOTE — NURSING
Oncology Navigation   Intake  Date of Diagnosis: 08/06/20  Cancer Type: Breast  MD Assigned: Dr.Ralph Benites  Internal / External Referral: Internal  Initial Nurse Navigator Contact: 08/11/20  Diagnosis to Initial Contact Timeline (days): 5 days  Contact Method: Phone  Date Worked: 08/11/20  First Appointment Available: 08/13/20  Appointment Date: 08/20/20  Schedule to Appointment Timeline (days): 9  First Available Date vs. Scheduled Date (days): 7  Reason if booked > 7 days after scheduling: Transportation coordination     Treatment              ER: Positive  ID: Positive  Her2: Negative     Acuity      Follow Up  No follow-ups on file.

## 2020-08-11 NOTE — TELEPHONE ENCOUNTER
Called & left message for Anel Resendiz at [facility] to schedule consult with breast surgery for pt.

## 2020-08-11 NOTE — TELEPHONE ENCOUNTER
Lizet with [facility] returned call to arrange breast surgery consult, she requested late next week for appt. Scheduled with  next Thursday & confirmed. Will call Buzz Powell, to notify of appt as well.

## 2020-08-12 LAB — SARS-COV-2 RNA RESP QL NAA+PROBE: NORMAL

## 2020-08-19 ENCOUNTER — PATIENT OUTREACH (OUTPATIENT)
Dept: ADMINISTRATIVE | Facility: OTHER | Age: 80
End: 2020-08-19

## 2020-08-19 NOTE — PROGRESS NOTES
LINKS immunization registry updated  Care Everywhere updated  Health Maintenance updated  Chart reviewed for overdue Proactive [facility] (BR[facility]) health maintenance testing (CRS, Breast Ca, Diabetic Eye Exam)   Orders entered:N/A

## 2020-08-20 ENCOUNTER — DOCUMENTATION ONLY (OUTPATIENT)
Dept: SURGERY | Facility: CLINIC | Age: 80
End: 2020-08-20

## 2020-08-20 ENCOUNTER — OFFICE VISIT (OUTPATIENT)
Dept: SURGERY | Facility: CLINIC | Age: 80
End: 2020-08-20
Payer: MEDICARE

## 2020-08-20 VITALS
BODY MASS INDEX: 26.97 KG/M2 | SYSTOLIC BLOOD PRESSURE: 141 MMHG | WEIGHT: 137.38 LBS | HEART RATE: 78 BPM | DIASTOLIC BLOOD PRESSURE: 64 MMHG | HEIGHT: 60 IN

## 2020-08-20 DIAGNOSIS — Z85.3 PERSONAL HISTORY OF BREAST CANCER: Primary | ICD-10-CM

## 2020-08-20 DIAGNOSIS — Z17.0 MALIGNANT NEOPLASM OF LEFT BREAST IN FEMALE, ESTROGEN RECEPTOR POSITIVE, UNSPECIFIED SITE OF BREAST: ICD-10-CM

## 2020-08-20 DIAGNOSIS — C50.412 PRIMARY CANCER OF UPPER OUTER QUADRANT OF LEFT FEMALE BREAST: ICD-10-CM

## 2020-08-20 DIAGNOSIS — C50.912 MALIGNANT NEOPLASM OF LEFT BREAST IN FEMALE, ESTROGEN RECEPTOR POSITIVE, UNSPECIFIED SITE OF BREAST: ICD-10-CM

## 2020-08-20 PROCEDURE — 3288F FALL RISK ASSESSMENT DOCD: CPT | Mod: HCNC,CPTII,S$GLB, | Performed by: SURGERY

## 2020-08-20 PROCEDURE — 99499 RISK ADDL DX/OHS AUDIT: ICD-10-PCS | Mod: HCNC,S$GLB,, | Performed by: SURGERY

## 2020-08-20 PROCEDURE — 3288F PR FALLS RISK ASSESSMENT DOCUMENTED: ICD-10-PCS | Mod: HCNC,CPTII,S$GLB, | Performed by: SURGERY

## 2020-08-20 PROCEDURE — 1125F PR PAIN SEVERITY QUANTIFIED, PAIN PRESENT: ICD-10-PCS | Mod: HCNC,S$GLB,, | Performed by: SURGERY

## 2020-08-20 PROCEDURE — 99999 PR PBB SHADOW E&M-EST. PATIENT-LVL III: ICD-10-PCS | Mod: PBBFAC,HCNC,, | Performed by: SURGERY

## 2020-08-20 PROCEDURE — 99204 PR OFFICE/OUTPT VISIT, NEW, LEVL IV, 45-59 MIN: ICD-10-PCS | Mod: HCNC,S$GLB,, | Performed by: SURGERY

## 2020-08-20 PROCEDURE — 99204 OFFICE O/P NEW MOD 45 MIN: CPT | Mod: HCNC,S$GLB,, | Performed by: SURGERY

## 2020-08-20 PROCEDURE — 1100F PR PT FALLS ASSESS DOC 2+ FALLS/FALL W/INJURY/YR: ICD-10-PCS | Mod: HCNC,CPTII,S$GLB, | Performed by: SURGERY

## 2020-08-20 PROCEDURE — 99499 UNLISTED E&M SERVICE: CPT | Mod: HCNC,S$GLB,, | Performed by: SURGERY

## 2020-08-20 PROCEDURE — 1100F PTFALLS ASSESS-DOCD GE2>/YR: CPT | Mod: HCNC,CPTII,S$GLB, | Performed by: SURGERY

## 2020-08-20 PROCEDURE — 1159F MED LIST DOCD IN RCRD: CPT | Mod: HCNC,S$GLB,, | Performed by: SURGERY

## 2020-08-20 PROCEDURE — 1159F PR MEDICATION LIST DOCUMENTED IN MEDICAL RECORD: ICD-10-PCS | Mod: HCNC,S$GLB,, | Performed by: SURGERY

## 2020-08-20 PROCEDURE — 1125F AMNT PAIN NOTED PAIN PRSNT: CPT | Mod: HCNC,S$GLB,, | Performed by: SURGERY

## 2020-08-20 PROCEDURE — 99999 PR PBB SHADOW E&M-EST. PATIENT-LVL III: CPT | Mod: PBBFAC,HCNC,, | Performed by: SURGERY

## 2020-08-20 NOTE — H&P (VIEW-ONLY)
New Breast Cancer  History and Physical  [facility]  Department of Surgery    REFERRING PROVIDER: Carolina Chavez MD  [address] [place] [address]    CHIEF COMPLAINT: left breast cancer    Subjective:      Nettie Villasenor is a 79 y.o. postmenopausal female referred for evaluation of recently diagnosed carcinoma of the left breast. The patient was initially referred for surgical evaluation of an abnormal mammogram first noted on 2020. Follow-up diagnostic L breast MMG/US showed L breast lower quadrant middle depth, irregular equal density measuring 0.8 cm. US revealed lesion in 4 o clock position, 5 cm from nipple. Irregular hypoechoic mass with indistinct margins measuring 0.5 cm. No adenopathy. 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 Ki-67: 5%     Patient does not routinely do self breast exams. She lives in [facility]. Has undergone yearly mammograms (last prior to  was in , which was normal). Patient has not noted a change on breast exam.  Patient denies nipple discharge. Patient denies to previous breast biopsy. Patient denies a personal history of breast cancer.    Findings at that time were the following:   Tumor size: 0.7 cm   Tumor ndgndrndanddndend:nd nd2nd Estrogen Receptor: positive  Progesterone Receptor: positive   Her-2 chad: negative     GYN History:  Age of menarche was 12. Age of menopause was approximately in 40s.  Last menstrual period was around the time of menopause. Patient denies hormonal therapy. Patient is .     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      alendronate (FOSAMAX) 70 MG tablet Take 70 mg by mouth every 7 days.       ARIPiprazole (ABILIFY) 2 MG Tab Take 1 tablet (2 mg total) by mouth every evening. 90 tablet 3    aspirin (ECOTRIN) 81 MG EC tablet TAKE 1 TABLET BY MOUTH daily TO PREVENT CLOTS 30 tablet 11    aspirin 81 MG Chew Take 81 mg by mouth once daily.      calcium carbonate (OS-OTTONIEL) 500 mg calcium (1,250 mg) tablet Take 1 tablet by mouth 2 (two) times daily.      docusate sodium (COLACE) 100 MG capsule Take 100 mg by mouth once daily.      fexofenadine (ALLEGRA) 180 MG tablet Take 180 mg by mouth daily as needed.      FLUoxetine 20 MG capsule Take 1 capsule (20 mg total) by mouth every morning. 90 capsule 3    lorazepam (ATIVAN) 0.5 MG tablet Take 0.5 mg by mouth every 12 (twelve) hours as needed.        multivitamin capsule Take 1 capsule by mouth once daily.      vitamin D 1000 units Tab Take 185 mg by mouth once daily.       No current facility-administered medications on file prior to visit.      Social History     Socioeconomic History    Marital status: Single     Spouse name: Not on file    Number of children: Not on file    Years of education: Not on file    Highest education level: Not on file   Occupational History    Not on file   Social Needs    Financial resource strain: Not on file    Food insecurity     Worry: Not on file     Inability: Not on file    Transportation needs     Medical: Not on file     Non-medical: Not on file   Tobacco Use    Smoking status: Never Smoker    Smokeless tobacco: Never Used   Substance and Sexual Activity    Alcohol use: No    Drug use: No    Sexual activity: Never   Lifestyle    Physical activity     Days per week: Not on file     Minutes per session:  Not on file    Stress: Not on file   Relationships    Social connections     Talks on phone: Not on file     Gets together: Not on file     Attends [employer] service: Not on file     Active member of club or organization: Not on file     Attends meetings of clubs or organizations: Not on file     Relationship status: Not on file   Other Topics Concern    Are you pregnant or think you may be? Not Asked    Breast-feeding Not Asked   Social History Narrative    Not on file     Family History   Problem Relation Age of Onset    Dementia Mother     Heart attack Father     Melanoma Neg Hx     Breast cancer Neg Hx     Colon cancer Neg Hx     Ovarian cancer Neg Hx         Review of Systems  Review of Systems   Constitutional: Positive for fatigue. Negative for chills, diaphoresis and fever. Unexpected weight change: per , several lb weight loss recently.   HENT: Negative for congestion, postnasal drip, rhinorrhea, sneezing and sore throat.    Eyes: Negative for photophobia.   Respiratory: Negative for cough, chest tightness and shortness of breath.    Cardiovascular: Negative for chest pain, palpitations and leg swelling.   Gastrointestinal: Negative for abdominal pain, constipation, diarrhea, nausea and vomiting.   Genitourinary: Negative for dysuria, frequency, hematuria, pelvic pain, vaginal bleeding and vaginal discharge.   Musculoskeletal: Negative for arthralgias, back pain and joint swelling.   Neurological: Negative for syncope, light-headedness and headaches.   Psychiatric/Behavioral: Negative for self-injury, sleep disturbance and suicidal ideas. The patient is not nervous/anxious.         Objective:   PHYSICAL EXAM:  BP (!) 141/64 (BP Location: Right arm, Patient Position: Sitting, BP Method: Medium (Automatic))   Pulse 78   Ht 5' (1.524 m)   Wt 62.3 kg (137 lb 5.6 oz)   BMI 26.82 kg/m²   General appearance: alert, appears stated age and cooperative  Head: Normocephalic, without obvious  abnormality, atraumatic  Neck: no adenopathy and supple, symmetrical, trachea midline  Lungs: normal effort, nonlabored breathing  Breasts: Left breast: No nipple retraction or dimpling, No nipple discharge or bleeding, No axillary or supraclavicular adenopathy, positive findings: approx 1 cm lesion 2 cm from the nipple in the 2 o clock region likely consistent with fibroadenoma seen on imaging. Right breast: No nipple retraction or dimpling, No nipple discharge or bleeding, No axillary or supraclavicular adenopathy, positive findings: none  Heart: regular rate and rhythm  Abdomen: soft, non-tender; bowel sounds normal; no masses,  no organomegaly  Extremities: extremities normal, atraumatic, no cyanosis or edema  Skin: normal, no edema and no lesions noted  Lymph nodes: Cervical, supraclavicular, and axillary nodes normal.  Neurologic: Grossly normal    Radiology review: Images personally reviewed by me in the clinic.     MAMMOGRAM   Initial on 07/14/20:  Impression:  Left: Focal Asymmetry: Left breast focal asymmetry at the upper outer middle position. Assessment: 0 - Incomplete. Diagnostic Mammogram and/or Ultrasound is recommended.   Right: There is no mammographic evidence of malignancy in the right breast.  BI-RADS Category:   Overall: 0 - Incomplete: Needs Additional Imaging Evaluation    Diagnostic MMG/US 07/21/20:  Impression: Left breast 04:00 o'clock axis mass corresponds to the left breast screening mammogram finding. BI-RADS 4: Suspicious. Recommend ultrasound-guided biopsy.   BI-RADS Category:   Overall: 4 - Suspicious  Recommendation:  Ultrasound guided biopsy for the left breast    Findings of US: In the left breast 04:00 o'clock axis 5 cm from the nipple, there is an irregular hypoechoic mass with indistinct margins measuring 0.5 cm by ultrasound. This corresponds to the left breast mammographic finding. No left axillary adenopathy.   Impression:  Left breast 04:00 o'clock axis mass corresponds to  the left breast screening mammogram finding. BI-RADS 4: Suspicious. Recommend ultrasound-guided biopsy.   BI-RADS Category:   Overall: 4 - Suspicious    PATHOLOGY  L breast biopsy (08/2020):  LEFT BREAST, MASS, CORE BIOPSY:  Invasive ductal carcinoma, Melina histologic grade 1 (tubular formation: 1, nuclear pleomorphism: 2,  mitotic activity: 1).  Comment: Most of the biopsy tissue is involved by well-differentiated invasive ductal carcinoma. A P63  stain is negative within infiltrating glands which supports the diagnosis. Microcalcifications are identified in  association with invasive carcinoma. The largest continuous focus of invasive carcinoma present measures  7 mm. Dr WALTER Monsivais also reviewed this case and agrees with the diagnosis.  BREAST BIOMARKER RESULTS  Estrogen receptor (ER): Positive (nearly 100%, strong)  Progesterone receptor (MD): Positive (nearly 100%, strong)  HER2 IHC: Negative (0)  Ki-67 proliferation index: 5%    Assessment:      Nettie Villasenor is a 79 y.o. postmenopausal female with recently diagnosed carcinoma of the left breast.      Plan:    Options for management were discussed with the patient and her family. We reviewed the existing data noting the equivalency of breast conserving surgery with radiation therapy and mastectomy. We also reviewed the guidelines of the National Comprehensive Cancer Network for Stage 1 breast carcinoma. We discussed the need for lumpectomy margins to be negative for carcinoma, the necessity for postoperative radiation therapy after breast conservation in most cases, the possibility of a failed or false negative sentinel lymph node biopsy and the potential need for complete lymphadenectomy for a failed or positive sentinel lymph node biopsy were fully discussed. In the setting of mastectomy, delayed or immediate reconstruction options are available and were discussed.     In the setting of lumpectomy, radiation therapy would not necessary be indicated  for this patient given her age.      We also discussed the role of systemic therapy in the treatment of early stage breast cancer.  We discussed that this is based on tumor biology and yamel status and will be determined based on final pathology.  We discussed that given her cancer is ER and CO+ and Her2 chad negative,  endocrine therapy would be recommended in most cases and its use can reduce the risk of recurrence as well as improve survival. Side effects of treatment were briefly discussed. We also discussed the potential role for chemotherapy based on a number of factors such as tumor phenotype (ER+ vs. triple negative vs. Hck1khp+) and this would be determined in coordination with the medical oncologist.    The patient, in consultation with her family, has elected to proceed with left lumpectomy. The operative risks of bleeding, infection, recurrence, scarring, and anesthetic complications and the possibility of requiring further surgery were all noted and informed consent obtained.    Surgery will be scheduled after patient coordinates possible dates with family.      Patient was educated on breast cancer, receptors, wire localization lumpectomy, mastectomy, sentinel lymph node mapping and biopsy, axillary lymph node dissection, reconstruction, breast prosthesis with post-mastectomy bra and radiation therapy. Patient was given patient information binder including [facility] breast cancer treatment brochure.  All her questions were answered.     I have personally taken the history and examined this patient, and I agree with the history, physical exam, assessment, and plan as written and outlined and stated above per the medical student.  Patient presents with her nephew and his wife as well has her primary caretaker in clinic today for consultation.  Patient was found to have an 8 mm area in the left lateral 4:00 a.m. region of the left breast 5 cm from the nipple.  Mammographic images from the diagnostic imaging  and biopsy were reviewed.  The patient has 2 large areas of popcorn calcification which appeared to be consistent with a fibroadenoma.  I can palpate the 2 cm fibroadenoma in the upper outer quadrant which is not the area of the malignancy.  She has no suspicious skin changes and no palpable axillary lymphadenopathy.  The primary malignancy in the 4:00 a.m. region is nonpalpable with no associated skin changes.    The tumor was estrogen and progesterone receptor positive and HER2 negative approximately 8 mm in size with the Ki-67 which was favorable at only 5% proliferation    Since she is 79 years old has to be taking care of with a primary caretaker we discussed all the options.  I told her normally we would recommend lumpectomy and sentinel lymph node biopsy with axillary staging and typically radiation.  I told her the [facility] data stating that and women over 70 with small favorable tumors like she has that we could manage this with wide local excision lumpectomy only without radiotherapy and without sentinel lymph node biopsy and place her on adjuvant aromatase inhibitor therapy    The patient and the family are very comfortable with this approach since she does not want radiation and she does not want mastectomy.  She would have some difficulty coming every day for for radiotherapy.    I told her that if she were or a late octogenarian that we could manage it with aromatase inhibitor therapy but at age 79 a reasonable approach would be to remove the area with wide local excision lumpectomy under monitored anesthesia care IV sedation local anesthesia without sentinel lymph node biopsy and without plans radiotherapy and then subsequently placed her on adjuvant aromatase inhibitor therapy.    Everyone in the room was comfortable with this approach    Surgical dates that will be considered will be September 4th her September 9th.    The family will look at their counter since they are going away for Labor Day  weekend and will let us know which is a good day for them.  If neither of those days are good for them than I will have them follow up with 1 of my partners since I cannot operate at [facility] after September 9th.    I told the patient and the family that I have submitted my resignation to [facility] and that I submitted it earlier this week and gave 90 day notice which originally the tentative plan was to operate until the middle of October, but I was informed this morning prior to clinic by the [facility] regional Medical Director that I would not be able to operate after September 9th.    They will discuss it with other family members and let us know whether they would like to pick September 4th or September 9th for surgery date or if not I will then have them follow up with 1 of my surgical partners for surgical planning and scheduling.  She will need a reflector placed in the left lateral 4:00 a.m. region of the left breast 5 cm from the nipple for the 8 mm invasive breast carcinoma.  Time spent with the patient today and in review of records and imaging was 45 min with greater than 50% of that time in counseling.

## 2020-08-20 NOTE — NURSING
Nurse Navigator Note:     Met with patient during her consult with Dr. Benites.  Patient and I reviewed the information she discussed with Dr. Benites, including treatment options, diagnosis, and future plans for workup. Patient and I went through the new patient binder, explained some of the information and why it is provided.     Also offered patient consults with our other specialty clinics: Dr. Orona for gynecological health during treatment, our breast physical therapy department for pre-op and post-operative assessments, Dr. Baldwin for psychological support, and [facility] for nutritional counseling. Explained to patient that all of these support services are completely optional. Discussed that physical therapy may call patient to offer pre-op appt, and what that appt would entail.     Patient was given a copy of her appointments, Dr. Benites's card, and my card. Encouraged her to call me if she has any questions or concerns or would like to schedule any additional appointments. Verbalized understanding of all information.   Oncology Navigation   Intake  Date of Diagnosis: 08/06/20  Cancer Type: Breast  MD Assigned: Dr.Ralph Benites  Internal / External Referral: Internal  Initial Nurse Navigator Contact: 08/11/20  Diagnosis to Initial Contact Timeline (days): 5 days  Contact Method: Phone  Date Worked: 08/11/20  First Appointment Available: 08/13/20  Appointment Date: 08/20/20  Schedule to Appointment Timeline (days): 9  First Available Date vs. Scheduled Date (days): 7  Reason if booked > 7 days after scheduling: Transportation coordination     Treatment              ER: Positive  ME: Positive  Her2: Negative     Acuity      Follow Up  Follow up in about 4 days (around 8/24/2020) for f/u on surgical plan.

## 2020-08-20 NOTE — PROGRESS NOTES
New Breast Cancer  History and Physical  [facility]  Department of Surgery    REFERRING PROVIDER: Carolina Chavez MD  [address] [place] [address]    CHIEF COMPLAINT: left breast cancer    Subjective:      Nettie Villasenor is a 79 y.o. postmenopausal female referred for evaluation of recently diagnosed carcinoma of the left breast. The patient was initially referred for surgical evaluation of an abnormal mammogram first noted on 2020. Follow-up diagnostic L breast MMG/US showed L breast lower quadrant middle depth, irregular equal density measuring 0.8 cm. US revealed lesion in 4 o clock position, 5 cm from nipple. Irregular hypoechoic mass with indistinct margins measuring 0.5 cm. No adenopathy. 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 Ki-67: 5%     Patient does not routinely do self breast exams. She lives in [facility]. Has undergone yearly mammograms (last prior to  was in , which was normal). Patient has not noted a change on breast exam.  Patient denies nipple discharge. Patient denies to previous breast biopsy. Patient denies a personal history of breast cancer.    Findings at that time were the following:   Tumor size: 0.7 cm   Tumor ndgndrndanddndend:nd nd2nd Estrogen Receptor: positive  Progesterone Receptor: positive   Her-2 chad: negative     GYN History:  Age of menarche was 12. Age of menopause was approximately in 40s.  Last menstrual period was around the time of menopause. Patient denies hormonal therapy. Patient is .     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      alendronate (FOSAMAX) 70 MG tablet Take 70 mg by mouth every 7 days.       ARIPiprazole (ABILIFY) 2 MG Tab Take 1 tablet (2 mg total) by mouth every evening. 90 tablet 3    aspirin (ECOTRIN) 81 MG EC tablet TAKE 1 TABLET BY MOUTH daily TO PREVENT CLOTS 30 tablet 11    aspirin 81 MG Chew Take 81 mg by mouth once daily.      calcium carbonate (OS-OTTONIEL) 500 mg calcium (1,250 mg) tablet Take 1 tablet by mouth 2 (two) times daily.      docusate sodium (COLACE) 100 MG capsule Take 100 mg by mouth once daily.      fexofenadine (ALLEGRA) 180 MG tablet Take 180 mg by mouth daily as needed.      FLUoxetine 20 MG capsule Take 1 capsule (20 mg total) by mouth every morning. 90 capsule 3    lorazepam (ATIVAN) 0.5 MG tablet Take 0.5 mg by mouth every 12 (twelve) hours as needed.        multivitamin capsule Take 1 capsule by mouth once daily.      vitamin D 1000 units Tab Take 185 mg by mouth once daily.       No current facility-administered medications on file prior to visit.      Social History     Socioeconomic History    Marital status: Single     Spouse name: Not on file    Number of children: Not on file    Years of education: Not on file    Highest education level: Not on file   Occupational History    Not on file   Social Needs    Financial resource strain: Not on file    Food insecurity     Worry: Not on file     Inability: Not on file    Transportation needs     Medical: Not on file     Non-medical: Not on file   Tobacco Use    Smoking status: Never Smoker    Smokeless tobacco: Never Used   Substance and Sexual Activity    Alcohol use: No    Drug use: No    Sexual activity: Never   Lifestyle    Physical activity     Days per week: Not on file     Minutes per session:  Not on file    Stress: Not on file   Relationships    Social connections     Talks on phone: Not on file     Gets together: Not on file     Attends Latter-day service: Not on file     Active member of club or organization: Not on file     Attends meetings of clubs or organizations: Not on file     Relationship status: Not on file   Other Topics Concern    Are you pregnant or think you may be? Not Asked    Breast-feeding Not Asked   Social History Narrative    Not on file     Family History   Problem Relation Age of Onset    Dementia Mother     Heart attack Father     Melanoma Neg Hx     Breast cancer Neg Hx     Colon cancer Neg Hx     Ovarian cancer Neg Hx         Review of Systems  Review of Systems   Constitutional: Positive for fatigue. Negative for chills, diaphoresis and fever. Unexpected weight change: per , several lb weight loss recently.   HENT: Negative for congestion, postnasal drip, rhinorrhea, sneezing and sore throat.    Eyes: Negative for photophobia.   Respiratory: Negative for cough, chest tightness and shortness of breath.    Cardiovascular: Negative for chest pain, palpitations and leg swelling.   Gastrointestinal: Negative for abdominal pain, constipation, diarrhea, nausea and vomiting.   Genitourinary: Negative for dysuria, frequency, hematuria, pelvic pain, vaginal bleeding and vaginal discharge.   Musculoskeletal: Negative for arthralgias, back pain and joint swelling.   Neurological: Negative for syncope, light-headedness and headaches.   Psychiatric/Behavioral: Negative for self-injury, sleep disturbance and suicidal ideas. The patient is not nervous/anxious.         Objective:   PHYSICAL EXAM:  BP (!) 141/64 (BP Location: Right arm, Patient Position: Sitting, BP Method: Medium (Automatic))   Pulse 78   Ht 5' (1.524 m)   Wt 62.3 kg (137 lb 5.6 oz)   BMI 26.82 kg/m²   General appearance: alert, appears stated age and cooperative  Head: Normocephalic, without obvious  abnormality, atraumatic  Neck: no adenopathy and supple, symmetrical, trachea midline  Lungs: normal effort, nonlabored breathing  Breasts: Left breast: No nipple retraction or dimpling, No nipple discharge or bleeding, No axillary or supraclavicular adenopathy, positive findings: approx 1 cm lesion 2 cm from the nipple in the 2 o clock region likely consistent with fibroadenoma seen on imaging. Right breast: No nipple retraction or dimpling, No nipple discharge or bleeding, No axillary or supraclavicular adenopathy, positive findings: none  Heart: regular rate and rhythm  Abdomen: soft, non-tender; bowel sounds normal; no masses,  no organomegaly  Extremities: extremities normal, atraumatic, no cyanosis or edema  Skin: normal, no edema and no lesions noted  Lymph nodes: Cervical, supraclavicular, and axillary nodes normal.  Neurologic: Grossly normal    Radiology review: Images personally reviewed by me in the clinic.     MAMMOGRAM   Initial on 07/14/20:  Impression:  Left: Focal Asymmetry: Left breast focal asymmetry at the upper outer middle position. Assessment: 0 - Incomplete. Diagnostic Mammogram and/or Ultrasound is recommended.   Right: There is no mammographic evidence of malignancy in the right breast.  BI-RADS Category:   Overall: 0 - Incomplete: Needs Additional Imaging Evaluation    Diagnostic MMG/US 07/21/20:  Impression: Left breast 04:00 o'clock axis mass corresponds to the left breast screening mammogram finding. BI-RADS 4: Suspicious. Recommend ultrasound-guided biopsy.   BI-RADS Category:   Overall: 4 - Suspicious  Recommendation:  Ultrasound guided biopsy for the left breast    Findings of US: In the left breast 04:00 o'clock axis 5 cm from the nipple, there is an irregular hypoechoic mass with indistinct margins measuring 0.5 cm by ultrasound. This corresponds to the left breast mammographic finding. No left axillary adenopathy.   Impression:  Left breast 04:00 o'clock axis mass corresponds to  the left breast screening mammogram finding. BI-RADS 4: Suspicious. Recommend ultrasound-guided biopsy.   BI-RADS Category:   Overall: 4 - Suspicious    PATHOLOGY  L breast biopsy (08/2020):  LEFT BREAST, MASS, CORE BIOPSY:  Invasive ductal carcinoma, Melina histologic grade 1 (tubular formation: 1, nuclear pleomorphism: 2,  mitotic activity: 1).  Comment: Most of the biopsy tissue is involved by well-differentiated invasive ductal carcinoma. A P63  stain is negative within infiltrating glands which supports the diagnosis. Microcalcifications are identified in  association with invasive carcinoma. The largest continuous focus of invasive carcinoma present measures  7 mm. Dr WALTER Monsivais also reviewed this case and agrees with the diagnosis.  BREAST BIOMARKER RESULTS  Estrogen receptor (ER): Positive (nearly 100%, strong)  Progesterone receptor (SD): Positive (nearly 100%, strong)  HER2 IHC: Negative (0)  Ki-67 proliferation index: 5%    Assessment:      Nettie Villasenor is a 79 y.o. postmenopausal female with recently diagnosed carcinoma of the left breast.      Plan:    Options for management were discussed with the patient and her family. We reviewed the existing data noting the equivalency of breast conserving surgery with radiation therapy and mastectomy. We also reviewed the guidelines of the National Comprehensive Cancer Network for Stage 1 breast carcinoma. We discussed the need for lumpectomy margins to be negative for carcinoma, the necessity for postoperative radiation therapy after breast conservation in most cases, the possibility of a failed or false negative sentinel lymph node biopsy and the potential need for complete lymphadenectomy for a failed or positive sentinel lymph node biopsy were fully discussed. In the setting of mastectomy, delayed or immediate reconstruction options are available and were discussed.     In the setting of lumpectomy, radiation therapy would not necessary be indicated  for this patient given her age.      We also discussed the role of systemic therapy in the treatment of early stage breast cancer.  We discussed that this is based on tumor biology and yamel status and will be determined based on final pathology.  We discussed that given her cancer is ER and GA+ and Her2 hcad negative,  endocrine therapy would be recommended in most cases and its use can reduce the risk of recurrence as well as improve survival. Side effects of treatment were briefly discussed. We also discussed the potential role for chemotherapy based on a number of factors such as tumor phenotype (ER+ vs. triple negative vs. Aya0ngk+) and this would be determined in coordination with the medical oncologist.    The patient, in consultation with her family, has elected to proceed with left lumpectomy. The operative risks of bleeding, infection, recurrence, scarring, and anesthetic complications and the possibility of requiring further surgery were all noted and informed consent obtained.    Surgery will be scheduled after patient coordinates possible dates with family.      Patient was educated on breast cancer, receptors, wire localization lumpectomy, mastectomy, sentinel lymph node mapping and biopsy, axillary lymph node dissection, reconstruction, breast prosthesis with post-mastectomy bra and radiation therapy. Patient was given patient information binder including [facility] breast cancer treatment brochure.  All her questions were answered.     I have personally taken the history and examined this patient, and I agree with the history, physical exam, assessment, and plan as written and outlined and stated above per the medical student.  Patient presents with her nephew and his wife as well has her primary caretaker in clinic today for consultation.  Patient was found to have an 8 mm area in the left lateral 4:00 a.m. region of the left breast 5 cm from the nipple.  Mammographic images from the diagnostic imaging  and biopsy were reviewed.  The patient has 2 large areas of popcorn calcification which appeared to be consistent with a fibroadenoma.  I can palpate the 2 cm fibroadenoma in the upper outer quadrant which is not the area of the malignancy.  She has no suspicious skin changes and no palpable axillary lymphadenopathy.  The primary malignancy in the 4:00 a.m. region is nonpalpable with no associated skin changes.    The tumor was estrogen and progesterone receptor positive and HER2 negative approximately 8 mm in size with the Ki-67 which was favorable at only 5% proliferation    Since she is 79 years old has to be taking care of with a primary caretaker we discussed all the options.  I told her normally we would recommend lumpectomy and sentinel lymph node biopsy with axillary staging and typically radiation.  I told her the [facility] data stating that and women over 70 with small favorable tumors like she has that we could manage this with wide local excision lumpectomy only without radiotherapy and without sentinel lymph node biopsy and place her on adjuvant aromatase inhibitor therapy    The patient and the family are very comfortable with this approach since she does not want radiation and she does not want mastectomy.  She would have some difficulty coming every day for for radiotherapy.    I told her that if she were or a late octogenarian that we could manage it with aromatase inhibitor therapy but at age 79 a reasonable approach would be to remove the area with wide local excision lumpectomy under monitored anesthesia care IV sedation local anesthesia without sentinel lymph node biopsy and without plans radiotherapy and then subsequently placed her on adjuvant aromatase inhibitor therapy.    Everyone in the room was comfortable with this approach    Surgical dates that will be considered will be September 4th her September 9th.    The family will look at their counter since they are going away for Labor Day  weekend and will let us know which is a good day for them.  If neither of those days are good for them than I will have them follow up with 1 of my partners since I cannot operate at [facility] after September 9th.    I told the patient and the family that I have submitted my resignation to [facility] and that I submitted it earlier this week and gave 90 day notice which originally the tentative plan was to operate until the middle of October, but I was informed this morning prior to clinic by the [facility] regional Medical Director that I would not be able to operate after September 9th.    They will discuss it with other family members and let us know whether they would like to pick September 4th or September 9th for surgery date or if not I will then have them follow up with 1 of my surgical partners for surgical planning and scheduling.  She will need a reflector placed in the left lateral 4:00 a.m. region of the left breast 5 cm from the nipple for the 8 mm invasive breast carcinoma.  Time spent with the patient today and in review of records and imaging was 45 min with greater than 50% of that time in counseling.

## 2020-08-20 NOTE — LETTER
August 22, 2020      Carolina Chavez MD  [address]  [place] [address]           [facility]  [address]  [place] [address]  Phone: [phone]  Fax: [phone]          Patient: Nettie Villasenor   MR Number: [id number]   YOB: 1940   Date of Visit: 8/20/2020       Dear Dr. Carolina Chavez:    Thank you for referring Nettie Villasenor to me for evaluation. Attached you will find relevant portions of my assessment and plan of care.    If you have questions, please do not hesitate to call me. I look forward to following Nettie Villasenor along with you.    Sincerely,    Los Benites MD    Enclosure  CC:  No Recipients    If you would like to receive this communication electronically, please contact [email or web address] or [phone] to request more information on [employer] access.    For providers and/or their staff who would like to refer a patient to [facility], please contact us through our one-stop-shop provider referral line, [facility], at [phone].    If you feel you have received this communication in error or would no longer like to receive these types of communications, please e-mail [email or web address]

## 2020-08-24 DIAGNOSIS — Z01.818 PRE-OP TESTING: Primary | ICD-10-CM

## 2020-08-24 DIAGNOSIS — C50.412 PRIMARY CANCER OF UPPER OUTER QUADRANT OF LEFT FEMALE BREAST: Primary | ICD-10-CM

## 2020-08-31 ENCOUNTER — TELEPHONE (OUTPATIENT)
Dept: SURGERY | Facility: CLINIC | Age: 80
End: 2020-08-31

## 2020-09-02 ENCOUNTER — HOSPITAL ENCOUNTER (OUTPATIENT)
Dept: CARDIOLOGY | Facility: CLINIC | Age: 80
Discharge: HOME | End: 2020-09-02
Payer: MEDICARE

## 2020-09-02 ENCOUNTER — HOSPITAL ENCOUNTER (OUTPATIENT)
Dept: RADIOLOGY | Facility: HOSPITAL | Age: 80
Discharge: HOME | End: 2020-09-02
Attending: SURGERY
Payer: MEDICARE

## 2020-09-02 ENCOUNTER — LAB VISIT (OUTPATIENT)
Dept: SURGERY | Facility: CLINIC | Age: 80
End: 2020-09-02
Payer: MEDICARE

## 2020-09-02 DIAGNOSIS — C50.912 INVASIVE DUCTAL CARCINOMA OF BREAST, LEFT: ICD-10-CM

## 2020-09-02 DIAGNOSIS — Z01.818 PRE-OP TESTING: ICD-10-CM

## 2020-09-02 DIAGNOSIS — R92.8 MAMMOGRAM ABNORMAL: ICD-10-CM

## 2020-09-02 PROCEDURE — 93010 ELECTROCARDIOGRAM REPORT: CPT | Mod: HCNC,S$GLB,, | Performed by: INTERNAL MEDICINE

## 2020-09-02 PROCEDURE — 77065 DX MAMMO INCL CAD UNI: CPT | Mod: TC,HCNC,LT

## 2020-09-02 PROCEDURE — 25000003 PHARM REV CODE 250: Mod: HCNC | Performed by: SURGERY

## 2020-09-02 PROCEDURE — 77065 DX MAMMO INCL CAD UNI: CPT | Mod: 26,HCNC,LT, | Performed by: RADIOLOGY

## 2020-09-02 PROCEDURE — 93010 EKG 12-LEAD: ICD-10-PCS | Mod: HCNC,S$GLB,, | Performed by: INTERNAL MEDICINE

## 2020-09-02 PROCEDURE — A4648 IMPLANTABLE TISSUE MARKER: HCPCS | Mod: HCNC

## 2020-09-02 PROCEDURE — 77061 BREAST TOMOSYNTHESIS UNI: CPT | Mod: 26,HCNC,LT, | Performed by: RADIOLOGY

## 2020-09-02 PROCEDURE — 19285 US BREAST RADAR REFLECTOR LOCALIZATION W/GUIDANCE, 1ST LESION, LEFT: ICD-10-PCS | Mod: HCNC,LT,, | Performed by: RADIOLOGY

## 2020-09-02 PROCEDURE — 77065 MAMMO DIGITAL DIAGNOSTIC LEFT WITH TOMOSYNTHESIS_CAD: ICD-10-PCS | Mod: 26,HCNC,LT, | Performed by: RADIOLOGY

## 2020-09-02 PROCEDURE — 93005 EKG 12-LEAD: ICD-10-PCS | Mod: HCNC,S$GLB,, | Performed by: SURGERY

## 2020-09-02 PROCEDURE — 93005 ELECTROCARDIOGRAM TRACING: CPT | Mod: HCNC,S$GLB,, | Performed by: SURGERY

## 2020-09-02 PROCEDURE — 77061 BREAST TOMOSYNTHESIS UNI: CPT | Mod: TC,HCNC,LT

## 2020-09-02 PROCEDURE — 77061 MAMMO DIGITAL DIAGNOSTIC LEFT WITH TOMOSYNTHESIS_CAD: ICD-10-PCS | Mod: 26,HCNC,LT, | Performed by: RADIOLOGY

## 2020-09-02 PROCEDURE — U0003 INFECTIOUS AGENT DETECTION BY NUCLEIC ACID (DNA OR RNA); SEVERE ACUTE RESPIRATORY SYNDROME CORONAVIRUS 2 (SARS-COV-2) (CORONAVIRUS DISEASE [COVID-19]), AMPLIFIED PROBE TECHNIQUE, MAKING USE OF HIGH THROUGHPUT TECHNOLOGIES AS DESCRIBED BY CMS-2020-01-R: HCPCS | Mod: HCNC

## 2020-09-02 PROCEDURE — 19285 PERQ DEV BREAST 1ST US IMAG: CPT | Mod: HCNC,LT,, | Performed by: RADIOLOGY

## 2020-09-02 RX ORDER — LIDOCAINE HYDROCHLORIDE 20 MG/ML
7 INJECTION, SOLUTION INFILTRATION; PERINEURAL ONCE
Status: DISCONTINUED | OUTPATIENT
Start: 2020-09-02 | End: 2020-09-03 | Stop reason: HOSPADM

## 2020-09-02 RX ORDER — LIDOCAINE HYDROCHLORIDE 20 MG/ML
7 INJECTION, SOLUTION INFILTRATION; PERINEURAL ONCE
Status: COMPLETED | OUTPATIENT
Start: 2020-09-02 | End: 2020-09-02

## 2020-09-02 RX ADMIN — LIDOCAINE HYDROCHLORIDE 7 ML: 20 INJECTION, SOLUTION INFILTRATION; PERINEURAL at 12:09

## 2020-09-03 ENCOUNTER — TELEPHONE (OUTPATIENT)
Dept: SURGERY | Facility: CLINIC | Age: 80
End: 2020-09-03

## 2020-09-03 ENCOUNTER — ANESTHESIA EVENT (OUTPATIENT)
Dept: SURGERY | Facility: HOSPITAL | Age: 80
End: 2020-09-03
Payer: MEDICARE

## 2020-09-03 LAB — SARS-COV-2 RNA RESP QL NAA+PROBE: NOT DETECTED

## 2020-09-03 NOTE — PLAN OF CARE
Covid results reviewed~ are negative and are within the acceptable 72 hour timeframe set per management.

## 2020-09-04 ENCOUNTER — ANESTHESIA (OUTPATIENT)
Dept: SURGERY | Facility: HOSPITAL | Age: 80
End: 2020-09-04
Payer: MEDICARE

## 2020-09-04 ENCOUNTER — HOSPITAL ENCOUNTER (OUTPATIENT)
Dept: RADIOLOGY | Facility: HOSPITAL | Age: 80
Discharge: HOME | End: 2020-09-04
Attending: SURGERY | Admitting: SURGERY
Payer: MEDICARE

## 2020-09-04 ENCOUNTER — HOSPITAL ENCOUNTER (OUTPATIENT)
Facility: HOSPITAL | Age: 80
Discharge: HOME | End: 2020-09-04
Attending: SURGERY | Admitting: SURGERY
Payer: MEDICARE

## 2020-09-04 VITALS
HEART RATE: 82 BPM | WEIGHT: 137 LBS | RESPIRATION RATE: 22 BRPM | TEMPERATURE: 97 F | OXYGEN SATURATION: 96 % | BODY MASS INDEX: 26.9 KG/M2 | HEIGHT: 60 IN | SYSTOLIC BLOOD PRESSURE: 133 MMHG | DIASTOLIC BLOOD PRESSURE: 69 MMHG

## 2020-09-04 DIAGNOSIS — C50.919 INVASIVE DUCTAL CARCINOMA OF BREAST: Primary | ICD-10-CM

## 2020-09-04 DIAGNOSIS — C50.912 INVASIVE DUCTAL CARCINOMA OF BREAST, LEFT: ICD-10-CM

## 2020-09-04 PROCEDURE — 88307 TISSUE EXAM BY PATHOLOGIST: CPT | Mod: HCNC | Performed by: PATHOLOGY

## 2020-09-04 PROCEDURE — 71000033 HC RECOVERY, INTIAL HOUR: Performed by: SURGERY

## 2020-09-04 PROCEDURE — 36000706: Performed by: SURGERY

## 2020-09-04 PROCEDURE — 63600175 PHARM REV CODE 636 W HCPCS: Performed by: NURSE ANESTHETIST, CERTIFIED REGISTERED

## 2020-09-04 PROCEDURE — 19301 PARTIAL MASTECTOMY: CPT | Mod: LT,,, | Performed by: SURGERY

## 2020-09-04 PROCEDURE — 25000003 PHARM REV CODE 250: Performed by: NURSE ANESTHETIST, CERTIFIED REGISTERED

## 2020-09-04 PROCEDURE — 88342 IMHCHEM/IMCYTCHM 1ST ANTB: CPT | Mod: 26,HCNC,, | Performed by: PATHOLOGY

## 2020-09-04 PROCEDURE — S0020 INJECTION, BUPIVICAINE HYDRO: HCPCS | Performed by: SURGERY

## 2020-09-04 PROCEDURE — 36000707: Performed by: SURGERY

## 2020-09-04 PROCEDURE — 19301 PR MASTECTOMY, PARTIAL: ICD-10-PCS | Mod: LT,,, | Performed by: SURGERY

## 2020-09-04 PROCEDURE — 64461 PVB THORACIC SINGLE INJ SITE: CPT | Mod: 59,LT,, | Performed by: ANESTHESIOLOGY

## 2020-09-04 PROCEDURE — 37000008 HC ANESTHESIA 1ST 15 MINUTES: Performed by: SURGERY

## 2020-09-04 PROCEDURE — 94761 N-INVAS EAR/PLS OXIMETRY MLT: CPT

## 2020-09-04 PROCEDURE — D9220A PRA ANESTHESIA: ICD-10-PCS | Mod: ANES,,, | Performed by: ANESTHESIOLOGY

## 2020-09-04 PROCEDURE — 88341 PR IHC OR ICC EACH ADD'L SINGLE ANTIBODY  STAINPR: ICD-10-PCS | Mod: 26,HCNC,, | Performed by: PATHOLOGY

## 2020-09-04 PROCEDURE — 99900035 HC TECH TIME PER 15 MIN (STAT)

## 2020-09-04 PROCEDURE — 88341 IMHCHEM/IMCYTCHM EA ADD ANTB: CPT | Mod: 26,HCNC,, | Performed by: PATHOLOGY

## 2020-09-04 PROCEDURE — S0028 INJECTION, FAMOTIDINE, 20 MG: HCPCS | Performed by: NURSE ANESTHETIST, CERTIFIED REGISTERED

## 2020-09-04 PROCEDURE — 63600175 PHARM REV CODE 636 W HCPCS: Performed by: STUDENT IN AN ORGANIZED HEALTH CARE EDUCATION/TRAINING PROGRAM

## 2020-09-04 PROCEDURE — 71000039 HC RECOVERY, EACH ADD'L HOUR: Performed by: SURGERY

## 2020-09-04 PROCEDURE — D9220A PRA ANESTHESIA: ICD-10-PCS | Mod: CRNA,,, | Performed by: NURSE ANESTHETIST, CERTIFIED REGISTERED

## 2020-09-04 PROCEDURE — 88342 CHG IMMUNOCYTOCHEMISTRY: ICD-10-PCS | Mod: 26,HCNC,, | Performed by: PATHOLOGY

## 2020-09-04 PROCEDURE — 27201423 OPTIME MED/SURG SUP & DEVICES STERILE SUPPLY: Performed by: SURGERY

## 2020-09-04 PROCEDURE — D9220A PRA ANESTHESIA: Mod: CRNA,,, | Performed by: NURSE ANESTHETIST, CERTIFIED REGISTERED

## 2020-09-04 PROCEDURE — 88342 IMHCHEM/IMCYTCHM 1ST ANTB: CPT | Mod: HCNC | Performed by: PATHOLOGY

## 2020-09-04 PROCEDURE — 25000003 PHARM REV CODE 250: Performed by: STUDENT IN AN ORGANIZED HEALTH CARE EDUCATION/TRAINING PROGRAM

## 2020-09-04 PROCEDURE — 71000015 HC POSTOP RECOV 1ST HR: Performed by: SURGERY

## 2020-09-04 PROCEDURE — 64461 PR PVB THORACIC SINGLE INJ SITE, INCL IMAGING: ICD-10-PCS | Mod: 59,LT,, | Performed by: ANESTHESIOLOGY

## 2020-09-04 PROCEDURE — 76098 MAMMO BREAST SPECIMEN: ICD-10-PCS | Mod: 26,,, | Performed by: RADIOLOGY

## 2020-09-04 PROCEDURE — 27200651 HC AIRWAY, LMA: Performed by: ANESTHESIOLOGY

## 2020-09-04 PROCEDURE — 88307 TISSUE EXAM BY PATHOLOGIST: CPT | Mod: 26,HCNC,, | Performed by: PATHOLOGY

## 2020-09-04 PROCEDURE — 94770 HC EXHALED C02 TEST: CPT

## 2020-09-04 PROCEDURE — 76098 X-RAY EXAM SURGICAL SPECIMEN: CPT | Mod: TC

## 2020-09-04 PROCEDURE — 25000003 PHARM REV CODE 250: Performed by: SURGERY

## 2020-09-04 PROCEDURE — D9220A PRA ANESTHESIA: Mod: ANES,,, | Performed by: ANESTHESIOLOGY

## 2020-09-04 PROCEDURE — 88341 IMHCHEM/IMCYTCHM EA ADD ANTB: CPT | Mod: HCNC | Performed by: PATHOLOGY

## 2020-09-04 PROCEDURE — 64461 PVB THORACIC SINGLE INJ SITE: CPT | Mod: 59,LT | Performed by: STUDENT IN AN ORGANIZED HEALTH CARE EDUCATION/TRAINING PROGRAM

## 2020-09-04 PROCEDURE — 76098 X-RAY EXAM SURGICAL SPECIMEN: CPT | Mod: 26,,, | Performed by: RADIOLOGY

## 2020-09-04 PROCEDURE — 37000009 HC ANESTHESIA EA ADD 15 MINS: Performed by: SURGERY

## 2020-09-04 PROCEDURE — 88307 PR  SURG PATH,LEVEL V: ICD-10-PCS | Mod: 26,HCNC,, | Performed by: PATHOLOGY

## 2020-09-04 RX ORDER — LIDOCAINE HYDROCHLORIDE 10 MG/ML
INJECTION, SOLUTION INTRAVENOUS
Status: DISCONTINUED | OUTPATIENT
Start: 2020-09-04 | End: 2020-09-04

## 2020-09-04 RX ORDER — CEFAZOLIN SODIUM 1 G/3ML
2 INJECTION, POWDER, FOR SOLUTION INTRAMUSCULAR; INTRAVENOUS
Status: COMPLETED | OUTPATIENT
Start: 2020-09-04 | End: 2020-09-04

## 2020-09-04 RX ORDER — FAMOTIDINE 10 MG/ML
INJECTION INTRAVENOUS
Status: DISCONTINUED | OUTPATIENT
Start: 2020-09-04 | End: 2020-09-04

## 2020-09-04 RX ORDER — ACETAMINOPHEN 500 MG
1000 TABLET ORAL
Status: COMPLETED | OUTPATIENT
Start: 2020-09-04 | End: 2020-09-04

## 2020-09-04 RX ORDER — ONDANSETRON 4 MG/1
8 TABLET, ORALLY DISINTEGRATING ORAL EVERY 8 HOURS PRN
Status: DISCONTINUED | OUTPATIENT
Start: 2020-09-04 | End: 2020-09-04 | Stop reason: HOSPADM

## 2020-09-04 RX ORDER — EPHEDRINE SULFATE 50 MG/ML
INJECTION, SOLUTION INTRAVENOUS
Status: DISCONTINUED | OUTPATIENT
Start: 2020-09-04 | End: 2020-09-04

## 2020-09-04 RX ORDER — BUPIVACAINE HYDROCHLORIDE 5 MG/ML
INJECTION, SOLUTION EPIDURAL; INTRACAUDAL
Status: DISCONTINUED | OUTPATIENT
Start: 2020-09-04 | End: 2020-09-04 | Stop reason: HOSPADM

## 2020-09-04 RX ORDER — SODIUM CHLORIDE 9 MG/ML
INJECTION, SOLUTION INTRAVENOUS CONTINUOUS
Status: DISCONTINUED | OUTPATIENT
Start: 2020-09-04 | End: 2022-11-28

## 2020-09-04 RX ORDER — PROPOFOL 10 MG/ML
VIAL (ML) INTRAVENOUS
Status: DISCONTINUED | OUTPATIENT
Start: 2020-09-04 | End: 2020-09-04

## 2020-09-04 RX ORDER — FENTANYL CITRATE 50 UG/ML
INJECTION, SOLUTION INTRAMUSCULAR; INTRAVENOUS
Status: DISCONTINUED | OUTPATIENT
Start: 2020-09-04 | End: 2020-09-04

## 2020-09-04 RX ORDER — HYDROCODONE BITARTRATE AND ACETAMINOPHEN 5; 325 MG/1; MG/1
1 TABLET ORAL EVERY 6 HOURS PRN
Qty: 21 TABLET | Refills: 0 | Status: SHIPPED | OUTPATIENT
Start: 2020-09-04 | End: 2023-05-03

## 2020-09-04 RX ORDER — SODIUM CHLORIDE 9 MG/ML
INJECTION, SOLUTION INTRAVENOUS CONTINUOUS
Status: DISCONTINUED | OUTPATIENT
Start: 2020-09-04 | End: 2020-09-04 | Stop reason: HOSPADM

## 2020-09-04 RX ORDER — DEXAMETHASONE SODIUM PHOSPHATE 4 MG/ML
INJECTION, SOLUTION INTRA-ARTICULAR; INTRALESIONAL; INTRAMUSCULAR; INTRAVENOUS; SOFT TISSUE
Status: DISCONTINUED | OUTPATIENT
Start: 2020-09-04 | End: 2020-09-04

## 2020-09-04 RX ORDER — SODIUM CHLORIDE 9 MG/ML
INJECTION, SOLUTION INTRAVENOUS CONTINUOUS PRN
Status: DISCONTINUED | OUTPATIENT
Start: 2020-09-04 | End: 2020-09-04

## 2020-09-04 RX ORDER — HYDROCODONE BITARTRATE AND ACETAMINOPHEN 5; 325 MG/1; MG/1
1 TABLET ORAL EVERY 4 HOURS PRN
Status: DISCONTINUED | OUTPATIENT
Start: 2020-09-04 | End: 2020-09-04 | Stop reason: HOSPADM

## 2020-09-04 RX ORDER — HYDROCODONE BITARTRATE AND ACETAMINOPHEN 10; 325 MG/1; MG/1
1 TABLET ORAL EVERY 4 HOURS PRN
Status: DISCONTINUED | OUTPATIENT
Start: 2020-09-04 | End: 2020-09-04 | Stop reason: HOSPADM

## 2020-09-04 RX ORDER — MIDAZOLAM HYDROCHLORIDE 1 MG/ML
0.5 INJECTION INTRAMUSCULAR; INTRAVENOUS
Status: DISCONTINUED | OUTPATIENT
Start: 2020-09-04 | End: 2022-11-28

## 2020-09-04 RX ORDER — FENTANYL CITRATE 50 UG/ML
25 INJECTION, SOLUTION INTRAMUSCULAR; INTRAVENOUS EVERY 5 MIN PRN
Status: DISCONTINUED | OUTPATIENT
Start: 2020-09-04 | End: 2022-11-28

## 2020-09-04 RX ORDER — ONDANSETRON 2 MG/ML
INJECTION INTRAMUSCULAR; INTRAVENOUS
Status: DISCONTINUED | OUTPATIENT
Start: 2020-09-04 | End: 2020-09-04

## 2020-09-04 RX ADMIN — CEFAZOLIN 2 G: 330 INJECTION, POWDER, FOR SOLUTION INTRAMUSCULAR; INTRAVENOUS at 11:09

## 2020-09-04 RX ADMIN — ACETAMINOPHEN 1000 MG: 500 TABLET ORAL at 09:09

## 2020-09-04 RX ADMIN — FENTANYL CITRATE 50 MCG: 50 INJECTION INTRAMUSCULAR; INTRAVENOUS at 10:09

## 2020-09-04 RX ADMIN — LIDOCAINE HYDROCHLORIDE 100 MG: 10 INJECTION, SOLUTION INTRAVENOUS at 11:09

## 2020-09-04 RX ADMIN — SODIUM CHLORIDE: 0.9 INJECTION, SOLUTION INTRAVENOUS at 09:09

## 2020-09-04 RX ADMIN — FENTANYL CITRATE 50 MCG: 50 INJECTION, SOLUTION INTRAMUSCULAR; INTRAVENOUS at 11:09

## 2020-09-04 RX ADMIN — PROPOFOL 50 MG: 10 INJECTION, EMULSION INTRAVENOUS at 12:09

## 2020-09-04 RX ADMIN — EPHEDRINE SULFATE 10 MG: 50 INJECTION INTRAVENOUS at 11:09

## 2020-09-04 RX ADMIN — DEXAMETHASONE SODIUM PHOSPHATE 4 MG: 4 INJECTION, SOLUTION INTRAMUSCULAR; INTRAVENOUS at 11:09

## 2020-09-04 RX ADMIN — FENTANYL CITRATE 50 MCG: 50 INJECTION, SOLUTION INTRAMUSCULAR; INTRAVENOUS at 12:09

## 2020-09-04 RX ADMIN — PROPOFOL 120 MG: 10 INJECTION, EMULSION INTRAVENOUS at 11:09

## 2020-09-04 RX ADMIN — SODIUM CHLORIDE: 0.9 INJECTION, SOLUTION INTRAVENOUS at 11:09

## 2020-09-04 RX ADMIN — EPHEDRINE SULFATE 15 MG: 50 INJECTION INTRAVENOUS at 11:09

## 2020-09-04 RX ADMIN — ONDANSETRON 4 MG: 2 INJECTION, SOLUTION INTRAMUSCULAR; INTRAVENOUS at 12:09

## 2020-09-04 RX ADMIN — SODIUM CHLORIDE, SODIUM GLUCONATE, SODIUM ACETATE, POTASSIUM CHLORIDE, MAGNESIUM CHLORIDE, SODIUM PHOSPHATE, DIBASIC, AND POTASSIUM PHOSPHATE: .53; .5; .37; .037; .03; .012; .00082 INJECTION, SOLUTION INTRAVENOUS at 11:09

## 2020-09-04 RX ADMIN — FAMOTIDINE 20 MG: 10 INJECTION, SOLUTION INTRAVENOUS at 11:09

## 2020-09-04 NOTE — PLAN OF CARE
VSS.  Patient tolerating oral liquids without difficulty.   No apparent s&s of distress noted at this time, no complaints voiced at this time.   Discharge instructions reviewed with patient and nephew with good verbal feedback received.   Post op medication prescription given.  Patient ready for discharge.

## 2020-09-04 NOTE — INTERVAL H&P NOTE
The patient has been examined and the H&P has been reviewed:    No acute interval changes    Surgery risks, benefits and alternative options discussed and understood by patient/family.          Active Hospital Problems    Diagnosis  POA    Invasive ductal carcinoma of breast [C50.919]  Yes      Resolved Hospital Problems   No resolved problems to display.

## 2020-09-04 NOTE — BRIEF OP NOTE
[facility]  Brief Operative Note    Surgery Date: 9/4/2020     Surgeon(s) and Role:     * Los Benites MD - Primary    Assisting Surgeon: None    Pre-op Diagnosis:  Primary cancer of upper outer quadrant of left female breast [C50.412]    Post-op Diagnosis:  Post-Op Diagnosis Codes:     * Primary cancer of upper outer quadrant of left female breast [C50.412]    Procedure(s) (LRB):  LUMPECTOMY,BREAST,WITH RADIOACTIVE SEED LOCALIZATION reflector placed on 9/2/20 (Left)    Anesthesia: General    Description of the findings of the procedure(s): Left breast tissue 4 o'clock mass excised with reflector and clip in speciment    Estimated Blood Loss: 5 cc         Specimens: Left breast tissue    Discharge Note    OUTCOME: Patient tolerated treatment/procedure well without complication and is now ready for discharge.    DISPOSITION: Home or Self Care    FINAL DIAGNOSIS:  Invasive ductal carcinoma of breast    FOLLOWUP: In clinic    DISCHARGE INSTRUCTIONS:    Discharge Procedure Orders   Diet general     Other restrictions (specify):   Order Comments: Wear your surgical bra every day until seen in clinic.     Call MD for:  extreme fatigue     Call MD for:  persistent dizziness or light-headedness     Call MD for:  redness, tenderness, or signs of infection (pain, swelling, redness, odor or green/yellow discharge around incision site)     Call MD for:  difficulty breathing, headache or visual disturbances     Call MD for:  hives     Call MD for:  severe uncontrolled pain     Call MD for:  persistent nausea and vomiting     Call MD for:  temperature >100.4     No dressing needed   Order Comments: Skin glue will come off over 3 weeks.  Do not submerge your incision (bath, pool, etc) for 2 weeks.  OK to shower in 48 hours. Use sponge bathes until then.     Activity as tolerated   Order Comments: Do not participate in any activity that would put strain on your incision.

## 2020-09-04 NOTE — ANESTHESIA PREPROCEDURE EVALUATION
09/04/2020  Nettie Villasenor is a 79 y.o., female.      Patient Active Problem List   Diagnosis    Thyroid nodule    Mild intellectual disability    Schizophrenia    Osteopenia    Mood disorder    Anxiety    Invasive ductal carcinoma of breast       Anesthesia Evaluation    I have reviewed the Patient Summary Reports.    I have reviewed the Nursing Notes. I have reviewed the NPO Status.   I have reviewed the Medications.     Review of Systems  Anesthesia Hx:  No problems with previous Anesthesia    Social:  Non-Smoker, No Alcohol Use    Cardiovascular:   ECG has been reviewed.    Hepatic/GI:   Denies GERD.    Psych:   Psychiatric History schizophrenia         Physical Exam  General:  Well nourished    Airway/Jaw/Neck:  Airway Findings: Mouth Opening: Normal Tongue: Normal  General Airway Assessment: Adult  Mallampati: II      Dental:  Dental Findings:    Chest/Lungs:  Chest/Lungs Findings: Clear to auscultation, Normal Respiratory Rate     Heart/Vascular:  Heart Findings: Rate: Normal  Rhythm: Regular Rhythm        Mental Status:  Mental Status Findings:  Cooperative, Alert and Oriented         Anesthesia Plan  Type of Anesthesia, risks & benefits discussed:  Anesthesia Type:  general, regional  Patient's Preference:   Intra-op Monitoring Plan: standard ASA monitors  Intra-op Monitoring Plan Comments:   Post Op Pain Control Plan: IV/PO Opioids PRN, multimodal analgesia and peripheral nerve block  Post Op Pain Control Plan Comments:   Induction:   IV  Beta Blocker:  Patient is not currently on a Beta-Blocker (No further documentation required).       Informed Consent: Patient understands risks and agrees with Anesthesia plan.  Questions answered. Anesthesia consent signed with patient.  ASA Score: 2     Day of Surgery Review of History & Physical:    H&P update referred to the surgeon.         Ready For  Surgery From Anesthesia Perspective.

## 2020-09-04 NOTE — ANESTHESIA POSTPROCEDURE EVALUATION
Anesthesia Post Evaluation    Patient: Nettie Villasenor    Procedure(s) Performed: Procedure(s) (LRB):  LUMPECTOMY,BREAST,WITH RADIOACTIVE SEED LOCALIZATION reflector placed on 9/2/20 (Left)    Final Anesthesia Type: general    Patient location during evaluation: PACU  Patient participation: Yes- Able to Participate  Level of consciousness: awake  Post-procedure vital signs: reviewed and stable  Pain management: adequate  Airway patency: patent    PONV status at discharge: No PONV  Anesthetic complications: no      Cardiovascular status: blood pressure returned to baseline  Respiratory status: unassisted  Hydration status: euvolemic  Follow-up not needed.          Vitals Value Taken Time   /63 09/04/20 1318   Temp 36.3 °C (97.3 °F) 09/04/20 1253   Pulse 82 09/04/20 1327   Resp 20 09/04/20 1327   SpO2 94 % 09/04/20 1327   Vitals shown include unvalidated device data.      No case tracking events are documented in the log.      Pain/Anthony Score: Pain Rating Prior to Med Admin: 0 (9/4/2020  9:35 AM)  Anthony Score: 10 (9/4/2020  1:00 PM)

## 2020-09-04 NOTE — ANESTHESIA PROCEDURE NOTES
Paravertebral Single Injection Block(s)    Patient location during procedure: pre-op   Block not for primary anesthetic.  Reason for block: at surgeon's request and post-op pain management   Post-op Pain Location: left chest pain  Start time: 9/4/2020 10:25 AM  Timeout: 9/4/2020 10:25 AM   End time: 9/4/2020 10:30 AM    Staffing  Authorizing Provider: Amita Ayala MD  Performing Provider: Mk Forrester MD    Preanesthetic Checklist  Completed: patient identified, site marked, surgical consent, pre-op evaluation, timeout performed, IV checked, risks and benefits discussed and monitors and equipment checked  Peripheral Block  Patient position: sitting  Prep: ChloraPrep  Patient monitoring: heart rate, cardiac monitor, continuous pulse ox, continuous capnometry and frequent blood pressure checks  Block type: paravertebral - thoracic  Laterality: left  Injection technique: single shot  Location: T4-5  Needle  Needle type: Tuohy   Needle gauge: 17 G  Needle length: 3.5 in  Needle localization: anatomical landmarks     Assessment  Injection assessment: negative aspiration and negative parasthesia  Paresthesia pain: none  Heart rate change: no  Slow fractionated injection: yes  Additional Notes  T4 os at 2.5 cm  VSS.  [facility] RN monitoring vitals throughout procedure.  Patient tolerated procedure well.     20 cc's of 0.5% ropi w/ epi administered via landmark technique.

## 2020-09-04 NOTE — TRANSFER OF CARE
Anesthesia Transfer of Care Note    Patient: Nettie Villasenor    Procedure(s) Performed: Procedure(s) (LRB):  LUMPECTOMY,BREAST,WITH RADIOACTIVE SEED LOCALIZATION reflector placed on 9/2/20 (Left)    Patient location: PACU    Anesthesia Type: general    Transport from OR: Transported from OR on room air with adequate spontaneous ventilation. Transported from OR on 6-10 L/min O2 by face mask with adequate spontaneous ventilation    Post pain: adequate analgesia    Post assessment: tolerated procedure well and no apparent anesthetic complications    Post vital signs: stable    Level of consciousness: awake    Nausea/Vomiting: no nausea/vomiting    Complications: none    Transfer of care protocol was followed      Last vitals:   Visit Vitals  BP (!) 132/57 (BP Location: Right arm, Patient Position: Lying)   Pulse 78   Temp 36.7 °C (98.1 °F) (Temporal)   Resp 20   Ht 5' (1.524 m)   Wt 62.1 kg (137 lb)   SpO2 100%   Breastfeeding No   BMI 26.76 kg/m²

## 2020-09-04 NOTE — PLAN OF CARE
As patient was getting in wheelchair for discharge, hematoma noted on right hand where IV was removed. Dressing removed and assessed by RN and Dr. Ayala. Site redressed with coban and warm pack placed on top. No complaints from the patient. Patient and family member instructed to keep dressing in place for a little while and keep elevated. Informed patient and family that there will be a bruise. Patient and family stated understanding.

## 2020-09-04 NOTE — ANESTHESIA PROCEDURE NOTES
Intubation  Performed by: Kary Rice CRNA  Authorized by: Amita Ayala MD     Intubation:     Induction:  Intravenous    Intubated:  Postinduction    Mask Ventilation:  Easy mask    Attempts:  1    Attempted By:  CRNA    Difficult Airway Encountered?: No      Complications:  None    Airway Device:  Supraglottic airway/LMA    Airway Device Size:  3.5    Style/Cuff Inflation:  Cuffed    Secured at:  The lips    Placement Verified By:  Capnometry    Complicating Factors:  None    Findings Post-Intubation:  BS equal bilateral

## 2020-09-05 NOTE — OP NOTE
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 female brought to the operating room for definitive surgery.  The patient was informed of the possible risks and complications of the procedure, including but not limited to anesthetic risks, bleeding, infection, and need for additional surgery.  The patient concurred with the proposed plan, and has given informed consent.  The site of surgery was properly noted/marked in the preoperative holding area.       The patient was seen in the preop area and the left side was marked.  She underwent evaluation by regional anesthesia team and was brought to the operating room.  Under anesthesia she was in a supine position.  The left breast was prepped and draped in a sterile fashion.  The GRISEL  probe was used to identify the area of interest in the lower outer region of the breast.  An oblique incision was made directly over the mass and area of interest and the skin incision was made.  We circumferentially dissected superiorly inferiorly medially and laterally around the area of interest using the GRISEL  probe intermittently to maintain at least a 10 mm grossly clear margin.  Patient had a history of fibroadenomas which were also encountered during the dissection.  The dissection was carried down to the pectoralis fascia posteriorly.  The specimen  was removed and oriented with the multi colored Inks in the standard fashion.  Specimen radiograph confirmed the original biopsy marker and reflector within the specimen with appropriate gross excisions.  Hemostasis was achieved throughout the lumpectomy cavity with cautery.  It was irrigated and with hemostasis achieved the wound was closed with deep dermal and subcutaneous Vicryl sutures followed by running 4 Monocryl subcuticular skin closure.  Sterile skin Dermabond and sterile dressing was applied.  We did not perform a sentinel lymph node biopsy due to the preop discussion with the patient's family since the patient lives in an assisted living facility and given her underlying comorbidities we decided that the optimum treatment would likely be wide local excision and endocrine therapy.    ESTIMATED BLOOD LOSS: Minimal    COMPLICATIONS: none    DISPOSITION: PACU - hemodynamically stable.    ATTESTATION:   I was present and scrubbed for the entire procedure.

## 2020-09-16 ENCOUNTER — TELEPHONE (OUTPATIENT)
Dept: SURGERY | Facility: CLINIC | Age: 80
End: 2020-09-16

## 2020-09-16 LAB
FINAL PATHOLOGIC DIAGNOSIS: NORMAL
GROSS: NORMAL

## 2020-09-16 NOTE — TELEPHONE ENCOUNTER
Returned Katja Lyman's call and scheduled Ms. Villasenor with Dr. Benites for a post op appointment on 9/17/20 at 11:15 am. Time and location of appointment verified and nurse verbalized understanding.     ----- Message from Cecelia Argueta sent at 9/16/2020 11:42 AM CDT -----  Regarding: katja lyman [facility]  Reason; Calling to reschedule pt post op appt. Please call     Communication; [phone]

## 2020-09-17 ENCOUNTER — OFFICE VISIT (OUTPATIENT)
Dept: SURGERY | Facility: CLINIC | Age: 80
End: 2020-09-17
Payer: MEDICARE

## 2020-09-17 VITALS
DIASTOLIC BLOOD PRESSURE: 63 MMHG | SYSTOLIC BLOOD PRESSURE: 133 MMHG | BODY MASS INDEX: 26.9 KG/M2 | WEIGHT: 137 LBS | HEIGHT: 60 IN | TEMPERATURE: 97 F | HEART RATE: 92 BPM

## 2020-09-17 DIAGNOSIS — E04.1 THYROID NODULE: Primary | ICD-10-CM

## 2020-09-17 DIAGNOSIS — C50.412 PRIMARY CANCER OF UPPER OUTER QUADRANT OF LEFT FEMALE BREAST: ICD-10-CM

## 2020-09-17 PROCEDURE — 99024 POSTOP FOLLOW-UP VISIT: CPT | Mod: HCNC,S$GLB,, | Performed by: SURGERY

## 2020-09-17 PROCEDURE — 99024 PR POST-OP FOLLOW-UP VISIT: ICD-10-PCS | Mod: HCNC,S$GLB,, | Performed by: SURGERY

## 2020-09-17 PROCEDURE — 99999 PR PBB SHADOW E&M-EST. PATIENT-LVL IV: ICD-10-PCS | Mod: PBBFAC,HCNC,, | Performed by: SURGERY

## 2020-09-17 PROCEDURE — 99999 PR PBB SHADOW E&M-EST. PATIENT-LVL IV: CPT | Mod: PBBFAC,HCNC,, | Performed by: SURGERY

## 2020-09-17 NOTE — PROGRESS NOTES
HPI: Patient is a 78 yo F who presents to clinic s/p left breast lumpectomy 9/4/20 for 0.7 mm invasive ductal carcinoma ER+/MN+ HER2 -. Hospital course was uncomplicated. Today she reports resolution of post operative pain. Denies fever, chills, sweating, nausea, vomiting, chest pain, SOB, and purulent drainage from incisions.     O:  There were no vitals filed for this visit.    Physical Exam:  Gen: WDWN, NAD  Breast: Left breast with well healing incision, no erythema/fluctuance  Cards: RRR, intact distal pulses  Pulm: Normal rate, not in respiratory distress  Neuro: AAO x 3    Path:  1. Left breast, lumpectomy:  - Benign breast tissue showing sequela of previous biopsy with marked fibrosis in a background of  atrophic change  - Negative for atypia or residual carcinoma, see comment    A: Doing well post operatively.    P: Will refer to medical oncology for discussion of further care.     John Aguilera MD  Surgery, PGYIII  [phone]    The wound is well healed in the left lateral breast with no signs of infection.  No subjective complaints  Pathology revealed no residual tumor in the lumpectomy specimen.  The original biopsy marker as well as reflector as well as prior biopsy site changes were noted in the lumpectomy cavity.  As previously discussed the patient is a good candidate to defer adjuvant radiotherapy given the CALGB data.  Will refer her to Medical Oncology to discuss adjuvant endocrine therapy especially since we are planning to defer adjuvant radiotherapy.  [place] lymph node biopsy was deferred as per the previous initial consultation discussion and note.  Will have the patient follow up at the breast Center in 6 months with a mid-level provider as part of her ongoing breast cancer screening and surveillance follow-up.

## 2020-09-23 ENCOUNTER — PES CALL (OUTPATIENT)
Dept: ADMINISTRATIVE | Facility: CLINIC | Age: 80
End: 2020-09-23

## 2020-09-24 ENCOUNTER — OFFICE VISIT (OUTPATIENT)
Dept: HEMATOLOGY/ONCOLOGY | Facility: CLINIC | Age: 80
End: 2020-09-24
Payer: COMMERCIAL

## 2020-09-24 VITALS
TEMPERATURE: 98 F | WEIGHT: 137 LBS | HEIGHT: 60 IN | RESPIRATION RATE: 20 BRPM | DIASTOLIC BLOOD PRESSURE: 65 MMHG | BODY MASS INDEX: 26.9 KG/M2 | SYSTOLIC BLOOD PRESSURE: 115 MMHG | OXYGEN SATURATION: 100 % | HEART RATE: 83 BPM

## 2020-09-24 DIAGNOSIS — C50.912 INFILTRATING DUCTAL CARCINOMA OF LEFT BREAST: Primary | ICD-10-CM

## 2020-09-24 PROCEDURE — 1159F MED LIST DOCD IN RCRD: CPT | Mod: S$GLB,,, | Performed by: STUDENT IN AN ORGANIZED HEALTH CARE EDUCATION/TRAINING PROGRAM

## 2020-09-24 PROCEDURE — 1126F PR PAIN SEVERITY QUANTIFIED, NO PAIN PRESENT: ICD-10-PCS | Mod: S$GLB,,, | Performed by: STUDENT IN AN ORGANIZED HEALTH CARE EDUCATION/TRAINING PROGRAM

## 2020-09-24 PROCEDURE — 99205 PR OFFICE/OUTPT VISIT, NEW, LEVL V, 60-74 MIN: ICD-10-PCS | Mod: S$GLB,,, | Performed by: STUDENT IN AN ORGANIZED HEALTH CARE EDUCATION/TRAINING PROGRAM

## 2020-09-24 PROCEDURE — 99205 OFFICE O/P NEW HI 60 MIN: CPT | Mod: S$GLB,,, | Performed by: STUDENT IN AN ORGANIZED HEALTH CARE EDUCATION/TRAINING PROGRAM

## 2020-09-24 PROCEDURE — 1126F AMNT PAIN NOTED NONE PRSNT: CPT | Mod: S$GLB,,, | Performed by: STUDENT IN AN ORGANIZED HEALTH CARE EDUCATION/TRAINING PROGRAM

## 2020-09-24 PROCEDURE — 99999 PR PBB SHADOW E&M-EST. PATIENT-LVL IV: ICD-10-PCS | Mod: PBBFAC,,, | Performed by: STUDENT IN AN ORGANIZED HEALTH CARE EDUCATION/TRAINING PROGRAM

## 2020-09-24 PROCEDURE — 1101F PT FALLS ASSESS-DOCD LE1/YR: CPT | Mod: CPTII,S$GLB,, | Performed by: STUDENT IN AN ORGANIZED HEALTH CARE EDUCATION/TRAINING PROGRAM

## 2020-09-24 PROCEDURE — 1159F PR MEDICATION LIST DOCUMENTED IN MEDICAL RECORD: ICD-10-PCS | Mod: S$GLB,,, | Performed by: STUDENT IN AN ORGANIZED HEALTH CARE EDUCATION/TRAINING PROGRAM

## 2020-09-24 PROCEDURE — 1101F PR PT FALLS ASSESS DOC 0-1 FALLS W/OUT INJ PAST YR: ICD-10-PCS | Mod: CPTII,S$GLB,, | Performed by: STUDENT IN AN ORGANIZED HEALTH CARE EDUCATION/TRAINING PROGRAM

## 2020-09-24 PROCEDURE — 99999 PR PBB SHADOW E&M-EST. PATIENT-LVL IV: CPT | Mod: PBBFAC,,, | Performed by: STUDENT IN AN ORGANIZED HEALTH CARE EDUCATION/TRAINING PROGRAM

## 2020-09-24 RX ORDER — ANASTROZOLE 1 MG/1
1 TABLET ORAL DAILY
Qty: 90 TABLET | Refills: 3 | Status: SHIPPED | OUTPATIENT
Start: 2020-10-01 | End: 2021-08-02 | Stop reason: SDUPTHER

## 2020-09-24 NOTE — PROGRESS NOTES
[facility]  [facility]  Hematology/Medical Oncology Clinic       PATIENT: Nettie Villasenor  MRN: [id number]  DATE: 2020    Reason for referral: Adjuvant therapy after lumpectomy for stage IA ER+ OK+ HER2- breast cancer      History of present illness   Initial History:   Ms. Villasenor is a 79 y.o. female with history as below is referred to oncology clinic for discuss hormonal adjuvant therapy in stage I ER+ HER2- breast cancer.   She lives in [facility]. She had an abnormal screening mammogram on 2020 showing Left breast 04:00 o'clock axis mass. US showed In the left breast lower outer quadrant middle depth, irregular equal density mass measuring 0.8 cm, No left axillary adenopathy. An US guided breast biopsy (with ribbon marker) was performed on 2020 which showed final path of well differentiated invasive ductal carcinoma (7 mm), grade 1, ER+ (100%), OK+ (100%), HER2 IHC negative(0). Ki-67: 5%   She had a lumpectomy on 2020 by Dr. Benites, with no sentinel node biopsy. She has uneventful procedure and  hospital course. Pathology from lumpectomy showed benign breast tissue showing sequela of previous biopsy with marked   fibrosis in a background of atrophic change,  Negative for atypia or residual carcinoma.   Today, she is with a sitter from the nursing home. She has no complaints. She says her breast wound is itching, but no pain. She has good appetite.       GYN History:  Age of menarche was 12. Age of menopause was approximately in 40s.  Last menstrual period was around the time of menopause. Patient denies hormonal therapy. Patient is .       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    Osteopenia 3/13/2013    Schizophrenia 3/13/2013    Thyroid nodule        Past Surgical HIstory:   Past Surgical History:   Procedure Laterality Date     LUMPECTOMY,BREAST,WITH RADIOACTIVE SEED LOCALIZATION Left 9/4/2020    Procedure: LUMPECTOMY,BREAST,WITH RADIOACTIVE SEED LOCALIZATION reflector placed on 9/2/20;  Surgeon: Los Benites MD;  Location: [facility];  Service: General;  Laterality: Left;    ORIF left lower leg      TONSILLECTOMY         Family History:   Family History   Problem Relation Age of Onset    Dementia Mother     Heart attack Father     Melanoma Neg Hx     Breast cancer Neg Hx     Colon cancer Neg Hx     Ovarian cancer Neg Hx        Social History:  reports that she has never smoked. She has never used smokeless tobacco. She reports that she does not drink alcohol or use drugs.    Allergies:  Review of patient's allergies indicates:  No Known Allergies    Medications:  Current Outpatient Medications   Medication Sig Dispense Refill    acetaminophen (TYLENOL EXTRA STRENGTH) 500 MG tablet Take 1,000 mg by mouth every evening.      alendronate (FOSAMAX) 70 MG tablet Take 70 mg by mouth every 7 days.       [START ON 10/1/2020] anastrozole (ARIMIDEX) 1 mg Tab Take 1 tablet (1 mg total) by mouth once daily. 90 tablet 3    ARIPiprazole (ABILIFY) 2 MG Tab Take 1 tablet (2 mg total) by mouth every evening. 90 tablet 3    aspirin (ECOTRIN) 81 MG EC tablet TAKE 1 TABLET BY MOUTH daily TO PREVENT CLOTS 30 tablet 11    aspirin 81 MG Chew Take 81 mg by mouth once daily.      calcium carbonate (OS-OTTONIEL) 500 mg calcium (1,250 mg) tablet Take 1 tablet by mouth 2 (two) times daily.      docusate sodium (COLACE) 100 MG capsule Take 100 mg by mouth once daily.      fexofenadine (ALLEGRA) 180 MG tablet Take 180 mg by mouth daily as needed.      FLUoxetine 20 MG capsule Take 1 capsule (20 mg total) by mouth every morning. 90 capsule 3    HYDROcodone-acetaminophen (NORCO) 5-325 mg per tablet Take 1 tablet by mouth every 6 (six) hours as needed for Pain. 21 tablet 0    lorazepam (ATIVAN) 0.5 MG tablet Take 0.5 mg by mouth every 12 (twelve) hours  as needed.        multivitamin capsule Take 1 capsule by mouth once daily.      vitamin D 1000 units Tab Take 185 mg by mouth once daily.       No current facility-administered medications for this visit.      Facility-Administered Medications Ordered in Other Visits   Medication Dose Route Frequency Provider Last Rate Last Dose    0.9%  NaCl infusion   Intravenous Continuous John Aguilera MD   Stopped at 09/04/20 1345    fentaNYL injection 25 mcg  25 mcg Intravenous Q5 Min PRN Mk Forresetr MD   50 mcg at 09/04/20 1015    midazolam (VERSED) 1 mg/mL injection 0.5 mg  0.5 mg Intravenous PRN Mk Forrester MD           Review of Systems   Constitutional: Negative for appetite change, fatigue, fever and unexpected weight change.   HENT: Negative for congestion.    Eyes: Negative for pain and visual disturbance.   Respiratory: Negative for cough, chest tightness, shortness of breath and wheezing.    Cardiovascular: Negative for chest pain, palpitations and leg swelling.   Gastrointestinal: Negative for abdominal pain, blood in stool, constipation, diarrhea, nausea and vomiting.   Genitourinary: Negative for difficulty urinating, flank pain, frequency, hematuria and urgency.   Musculoskeletal: Negative for arthralgias, back pain and myalgias.   Neurological: Negative for dizziness, weakness, numbness and headaches.   Psychiatric/Behavioral: The patient is not nervous/anxious.        ECOG Performance Status: 2   Objective:      Vitals:   Vitals:    09/24/20 0958   BP: 115/65   BP Location: Left arm   Patient Position: Sitting   BP Method: Medium (Automatic)   Pulse: 83   Resp: 20   Temp: 97.7 °F (36.5 °C)   TempSrc: Oral   SpO2: 100%   Weight: 62.1 kg (137 lb)   Height: 5' (1.524 m)       Physical Exam  Constitutional:       General: She is not in acute distress.     Appearance: Normal appearance.   HENT:      Head: Normocephalic and atraumatic.   Eyes:      Pupils: Pupils are equal,  round, and reactive to light.   Cardiovascular:      Rate and Rhythm: Normal rate and regular rhythm.      Heart sounds: No murmur.   Pulmonary:      Effort: No respiratory distress.      Breath sounds: Normal breath sounds. No wheezing.   Chest:       Abdominal:      General: Abdomen is flat. Bowel sounds are normal. There is no distension.      Palpations: Abdomen is soft. There is no mass.      Tenderness: There is no abdominal tenderness.   Musculoskeletal:         General: No swelling or deformity.   Lymphadenopathy:      Cervical: No cervical adenopathy.   Skin:     Coloration: Skin is not jaundiced.   Neurological:      General: No focal deficit present.      Mental Status: She is alert and oriented to person, place, and time.         Laboratory Data:  No visits with results within 1 Week(s) from this visit.   Latest known visit with results is:   Admission on 09/04/2020, Discharged on 09/04/2020   Component Date Value Ref Range Status    Final Pathologic Diagnosis 09/04/2020    Final                    Value:1.  Left breast, lumpectomy:      -  Benign breast tissue showing sequela of previous biopsy with marked  fibrosis in a background of         atrophic change      -  Negative for atypia or residual carcinoma, see comment  Comment:  The specimen was submitted in its entirety and immunohistochemical  stains for AE1/AE3 and p63 were performed (on blocks 1A, 1B and 1E).  After  thorough evaluation, no evidence residual ductal carcinoma was identified.  The patient's previous biopsy ([id number]) was reviewed and the initial  diagnosis was confirmed.  Of note, breast imaging revealed a mass measuring  0.5 cm on ultrasound and 0.8 cm on mammography.  The patient's initial biopsy  showed a focus of invasive ductal carcinoma measuring 0.7 mm making it likely  that they lesion could have been removed completely during biopsy.  Clinical  and radiographic correlation is necessary.      Interp By Estrella Butler,  "M.D., Signed on 09/16/2020 at 12:39    Gross 09/04/2020    Final                    Value:Patient ID/Pathology ID [id number]  Received fresh and subsequently fixed in formalin, labeled "left breast  lumpectomy", is a lumpectomy specimen oriented by colored ink indicating:  Blue-superior, green- inferior, yellow- anterior, black- posterior, orange-  lateral and red- medial surgical margins.  The specimen measures 5 cm from  lateral to medial 4.8 cm from superior to inferior and 2.5 cm from anterior  to posterior.  The specimen is serially sectioned from superior to inferior  into 8 levels.  In level 6 the specimen has an indurated, ill-defined,  gray-pink hemorrhagic lesion which measures 1.1 cm from lateral to medial, 1  cm from anterior to posterior.  A metallic clip and a reflector are  identified within the lesion.  Grossly the lesion is located at 5 mm from the  anterior margin, 4 mm from posterior margin, 15 mm from lateral margin, 17 mm  from medial margin and more than 20 mm from the rest of the margins.  The  specimen is sectioned and representative sections are submitt                          ed in cassettes  301-1:  A:  Level 5 adjacent to lesion, anterior and posterior margins  B:  Levels 6, lesion, anterior and posterior margins  C:  Level 6, lateral margin  D:  Level 6, medial margin  E:  Level 7 adjacent to lesion, anterior and posterior margins  F:  Level 1, radial superior margin  G:  Level 8, radial inferior margin  H-I:  Level 2, anterior, posterior, lateral and medial margins  J-K:  Level 3, anterior, posterior, lateral and medial margins  L-M:  Level 4, anterior, posterior, lateral and medial margins  N:  Lateral margin  O:  Medial margin  P-Q:  Remainder superior margin  R-S:  Remainder inferior margin.  Note:  Fixation time is greater than 72 hr  Chirag ELISE ([facility]) cm           Imaging:  MAMMOGRAM   Initial on 07/14/20:  Impression:  Left: Focal Asymmetry: Left breast focal asymmetry at the " upper outer middle position. Assessment: 0 - Incomplete. Diagnostic Mammogram and/or Ultrasound is recommended.   Right: There is no mammographic evidence of malignancy in the right breast.  BI-RADS Category:   Overall: 0 - Incomplete: Needs Additional Imaging Evaluation     Diagnostic MMG/US 07/21/20:  Impression: Left breast 04:00 o'clock axis mass corresponds to the left breast screening mammogram finding. BI-RADS 4: Suspicious. Recommend ultrasound-guided biopsy.   BI-RADS Category:   Overall: 4 - Suspicious  Recommendation:  Ultrasound guided biopsy for the left breast     Findings of US: In the left breast 04:00 o'clock axis 5 cm from the nipple, there is an irregular hypoechoic mass with indistinct margins measuring 0.5 cm by ultrasound. This corresponds to the left breast mammographic finding. No left axillary adenopathy.   Impression:  Left breast 04:00 o'clock axis mass corresponds to the left breast screening mammogram finding. BI-RADS 4: Suspicious. Recommend ultrasound-guided biopsy.   BI-RADS Category:   Overall: 4 - Suspicious     PATHOLOGY  L breast biopsy (08/2020):  LEFT BREAST, MASS, CORE BIOPSY:  Invasive ductal carcinoma, Mel[place] histologic grade 1 (tubular formation: 1, nuclear pleomorphism: 2,  mitotic activity: 1).  Comment: Most of the biopsy tissue is involved by well-differentiated invasive ductal carcinoma. A P63  stain is negative within infiltrating glands which supports the diagnosis. Microcalcifications are identified in  association with invasive carcinoma. The largest continuous focus of invasive carcinoma present measures  7 mm. Dr WALTER Monsivais also reviewed this case and agrees with the diagnosis.  BREAST BIOMARKER RESULTS  Estrogen receptor (ER): Positive (nearly 100%, strong)  Progesterone receptor (MN): Positive (nearly 100%, strong)  HER2 IHC: Negative (0)  Ki-67 proliferation index: 5%    Assessment:       1. Infiltrating ductal carcinoma of left breast      Plan:   79  years old female patient with recent diagnosis of stage Ia ER+ HER2 - left breast cancer.   As noted in Dr. Benites's notes, He had discussion with the patient and giving her age, radiation therapy would not be necessary (data from CALGB 9343). We offered her Radiation oncology referral to discuss the benefit of radiation but she refused. Her Nephew; Mr. Villasenor was also called ([phone]), and he declined the Rad onc referral   Based on her tumor size, and histology, she has 7% risk of recurrence in the first 10 years  We recommend to start aromatase inhibitor; anastrozole 1 mg daily, to complete 5 years.   The benefits and side effects of endocrine therapy were discussed with the patient and her family, and they agreed to start it  - She will start treatment on 10/1, and will see her again in one month 11/05. will plan a DEXA next visit to get a baseline     The above was staffed and discussed with Dr.Chris Pravin Otero MD  Hematology/Oncology fellow. PGY IV      ATTENDING NOTE, ONCOLOGY CLINIC    As above.  Patient seen and examined, chart reviewed.  Appears comfortable, in NAD.  Lungs are clear to auscultation.  Abdomen is soft, nontender.   Pathology reviewed.    PLAN     We offered a referral for consultation with the radiation oncologist, however, the patient stated that she is not interested in receiving radiation.  In view of the above, she will be started on anastrozole on October 1st and we will see her with a DEXA scan in early November.  The pros and cons of such an approach were explained to her in detail and her questions were answered to her satisfaction.    William Costello MD

## 2020-09-25 DIAGNOSIS — C50.912 INFILTRATING DUCTAL CARCINOMA OF LEFT BREAST: Primary | ICD-10-CM

## 2020-09-28 ENCOUNTER — TELEPHONE (OUTPATIENT)
Dept: HEMATOLOGY/ONCOLOGY | Facility: CLINIC | Age: 80
End: 2020-09-28

## 2020-10-09 ENCOUNTER — LAB VISIT (OUTPATIENT)
Dept: LAB | Facility: OTHER | Age: 80
End: 2020-10-09
Payer: MEDICARE

## 2020-10-09 DIAGNOSIS — Z03.818 ENCOUNTER FOR OBSERVATION FOR SUSPECTED EXPOSURE TO OTHER BIOLOGICAL AGENTS RULED OUT: ICD-10-CM

## 2020-10-09 PROCEDURE — U0003 INFECTIOUS AGENT DETECTION BY NUCLEIC ACID (DNA OR RNA); SEVERE ACUTE RESPIRATORY SYNDROME CORONAVIRUS 2 (SARS-COV-2) (CORONAVIRUS DISEASE [COVID-19]), AMPLIFIED PROBE TECHNIQUE, MAKING USE OF HIGH THROUGHPUT TECHNOLOGIES AS DESCRIBED BY CMS-2020-01-R: HCPCS

## 2020-10-10 LAB — SARS-COV-2 RNA RESP QL NAA+PROBE: NOT DETECTED

## 2020-10-27 ENCOUNTER — IMMUNIZATION (OUTPATIENT)
Dept: INTERNAL MEDICINE | Facility: CLINIC | Age: 80
End: 2020-10-27
Payer: COMMERCIAL

## 2020-10-27 PROCEDURE — 90694 VACC AIIV4 NO PRSRV 0.5ML IM: CPT | Mod: S$GLB,,, | Performed by: INTERNAL MEDICINE

## 2020-10-27 PROCEDURE — 90694 FLU VACCINE - QUADRIVALENT - ADJUVANTED: ICD-10-PCS | Mod: S$GLB,,, | Performed by: INTERNAL MEDICINE

## 2020-10-27 PROCEDURE — 90471 FLU VACCINE - QUADRIVALENT - ADJUVANTED: ICD-10-PCS | Mod: S$GLB,,, | Performed by: INTERNAL MEDICINE

## 2020-10-27 PROCEDURE — 90471 IMMUNIZATION ADMIN: CPT | Mod: S$GLB,,, | Performed by: INTERNAL MEDICINE

## 2020-11-02 ENCOUNTER — HOSPITAL ENCOUNTER (OUTPATIENT)
Dept: RADIOLOGY | Facility: CLINIC | Age: 80
Discharge: HOME | End: 2020-11-02
Attending: STUDENT IN AN ORGANIZED HEALTH CARE EDUCATION/TRAINING PROGRAM
Payer: MEDICARE

## 2020-11-02 DIAGNOSIS — C50.912 INFILTRATING DUCTAL CARCINOMA OF LEFT BREAST: ICD-10-CM

## 2020-11-02 PROCEDURE — 77080 DEXA BONE DENSITY SPINE HIP: ICD-10-PCS | Mod: 26,,, | Performed by: INTERNAL MEDICINE

## 2020-11-02 PROCEDURE — 77080 DXA BONE DENSITY AXIAL: CPT | Mod: 26,,, | Performed by: INTERNAL MEDICINE

## 2020-11-02 PROCEDURE — 77080 DXA BONE DENSITY AXIAL: CPT | Mod: TC

## 2020-11-04 NOTE — PROGRESS NOTES
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 Ki-67: 5%  09/04/2020: Lumpectomy   10/01/2020: Started on Anastrozole adjuvant therapy        History of present illness   History:   Ms. Villasenor is a 80 y.o. female with history as below is referred to oncology clinic for discuss hormonal adjuvant therapy in stage I ER+ HER2- breast cancer.   She lives in [facility]. She had an abnormal screening mammogram on 07/2020 showing Left breast 04:00 o'clock axis mass. US showed In the left breast lower outer quadrant middle depth, irregular equal density mass measuring 0.8 cm, No left axillary adenopathy. 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 Ki-67: 5%   She had a lumpectomy on 09/04/2020 by Dr. Benites, with no sentinel node biopsy. She has uneventful procedure and  hospital course. Pathology from lumpectomy showed benign breast tissue showing sequela of previous biopsy with marked fibrosis in a background of atrophic change,  Negative for atypia or residual carcinoma.   She is referred for adjuvant endocrine therapy.   She is started on Anastrozole 10/2020. Tolerating well.     Interval history  Today, she is with a sitter from the nursing home. She has no complaints. She says her breast wound is itching, but no pain. She has good appetite, regular bowel movements. No muscle cramps. No other reported side effects of anastrozole.      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 Age of menopause was approximately in 40s.  Last menstrual period was around the time of menopause. Patient denies hormonal therapy. Patient is .    Past Surgical HIstory:   Past Surgical History:   Procedure Laterality Date    LUMPECTOMY,BREAST,WITH RADIOACTIVE SEED LOCALIZATION Left 2020    Procedure: LUMPECTOMY,BREAST,WITH RADIOACTIVE SEED LOCALIZATION reflector placed on 20;  Surgeon: Los Benites MD;  Location: [facility];  Service: General;  Laterality: Left;    ORIF left lower leg      TONSILLECTOMY         Family History:   Family History   Problem Relation Age of Onset    Dementia Mother     Heart attack Father     Melanoma Neg Hx     Breast cancer Neg Hx     Colon cancer Neg Hx     Ovarian cancer Neg Hx        Social History:  reports that she has never smoked. She has never used smokeless tobacco. She reports that she does not drink alcohol or use drugs.    Allergies:  Review of patient's allergies indicates:  No Known Allergies    Medications:  Current Outpatient Medications   Medication Sig Dispense Refill    acetaminophen (TYLENOL EXTRA STRENGTH) 500 MG tablet Take 1,000 mg by mouth every evening.      alendronate (FOSAMAX) 70 MG tablet Take 70 mg by mouth every 7 days.       anastrozole (ARIMIDEX) 1 mg Tab Take 1 tablet (1 mg total) by mouth once daily. 90 tablet 3    ARIPiprazole (ABILIFY) 2 MG Tab Take 1 tablet (2 mg total) by mouth every evening. 90 tablet 3    aspirin (ECOTRIN) 81 MG EC tablet TAKE 1 TABLET BY MOUTH daily TO PREVENT CLOTS 30 tablet 11    aspirin 81 MG Chew Take 81 mg by mouth once daily.      calcium carbonate (OS-OTTONIEL) 500 mg calcium (1,250 mg) tablet Take 1 tablet by mouth 2 (two) times daily.      docusate sodium (COLACE) 100 MG capsule Take 100 mg by mouth once daily.       fexofenadine (ALLEGRA) 180 MG tablet Take 180 mg by mouth daily as needed.      FLUoxetine 20 MG capsule Take 1 capsule (20 mg total) by mouth every morning. 90 capsule 3    HYDROcodone-acetaminophen (NORCO) 5-325 mg per tablet Take 1 tablet by mouth every 6 (six) hours as needed for Pain. 21 tablet 0    lorazepam (ATIVAN) 0.5 MG tablet Take 0.5 mg by mouth every 12 (twelve) hours as needed.        multivitamin capsule Take 1 capsule by mouth once daily.      vitamin D 1000 units Tab Take 185 mg by mouth once daily.       No current facility-administered medications for this visit.      Facility-Administered Medications Ordered in Other Visits   Medication Dose Route Frequency Provider Last Rate Last Dose    0.9%  NaCl infusion   Intravenous Continuous John Aguilera MD   Stopped at 09/04/20 1345    fentaNYL injection 25 mcg  25 mcg Intravenous Q5 Min PRN Mk Forrester MD   50 mcg at 09/04/20 1015    midazolam (VERSED) 1 mg/mL injection 0.5 mg  0.5 mg Intravenous PRN Mk Forrester MD           Review of Systems   Constitutional: Negative for appetite change, fatigue, fever and unexpected weight change.   HENT: Negative for congestion.    Eyes: Negative for pain and visual disturbance.   Respiratory: Negative for cough, chest tightness, shortness of breath and wheezing.    Cardiovascular: Negative for chest pain, palpitations and leg swelling.   Gastrointestinal: Negative for abdominal pain, blood in stool, constipation, diarrhea, nausea and vomiting.   Genitourinary: Negative for difficulty urinating, flank pain, frequency, hematuria and urgency.   Musculoskeletal: Negative for arthralgias, back pain and myalgias.   Neurological: Negative for dizziness, weakness, numbness and headaches.   Psychiatric/Behavioral: The patient is not nervous/anxious.        ECOG Performance Status: 2   Objective:      Vitals:   There were no vitals filed for this visit.    Physical  Exam  Constitutional:       General: She is not in acute distress.     Appearance: Normal appearance.   HENT:      Head: Normocephalic and atraumatic.   Eyes:      Pupils: Pupils are equal, round, and reactive to light.   Cardiovascular:      Rate and Rhythm: Normal rate and regular rhythm.      Heart sounds: No murmur.   Pulmonary:      Effort: No respiratory distress.      Breath sounds: Normal breath sounds. No wheezing.   Chest:       Abdominal:      General: Abdomen is flat. Bowel sounds are normal. There is no distension.      Palpations: Abdomen is soft. There is no mass.      Tenderness: There is no abdominal tenderness.   Musculoskeletal:         General: No swelling or deformity.   Lymphadenopathy:      Cervical: No cervical adenopathy.   Skin:     Coloration: Skin is not jaundiced.   Neurological:      General: No focal deficit present.      Mental Status: She is alert and oriented to person, place, and time.         Laboratory Data:  No visits with results within 1 Week(s) from this visit.   Latest known visit with results is:   Lab Visit on 10/09/2020   Component Date Value Ref Range Status    SARS-CoV2 (COVID-19) Qualitative P* 10/09/2020 Not Detected  Not Detected Final    Comment: This test utilizes a real-time reverse transcription  polymerase chain reaction procedure to amplify and   detect the SARS-CoV-2 RdRp and N genes.    The analytical sensitivity (limit of detection) of   this assay is 100 copies/mL.   A Detected result is considered positive for COVID-19.  This patient is considered infected with the   SARS-CoV-2 virus and is presumed to be contagious.    A Not Detected result means that SARS-CoV-2 RNA is not  present above the limit of detection. It does not rule  out the possibility of COVID-19 and should not be the  sole basis for treatment decisions.  If COVID-19 is   strongly suspected based on clinical and exposure   history,re-testing should be considered.    This test is only for  use under Food and Drug   Administration s Emergency Use Authorization (EUA).   Commercial reagents are provided by [employer].  Performance characteristics of the EUA have been   independently verified by [facility]   Department of Pathology and L                           aboratory Medicine.             Imaging:  MAMMOGRAM   Initial on 07/14/20:  Impression:  Left: Focal Asymmetry: Left breast focal asymmetry at the upper outer middle position. Assessment: 0 - Incomplete. Diagnostic Mammogram and/or Ultrasound is recommended.   Right: There is no mammographic evidence of malignancy in the right breast.  BI-RADS Category:   Overall: 0 - Incomplete: Needs Additional Imaging Evaluation     Diagnostic MMG/US 07/21/20:  Impression: Left breast 04:00 o'clock axis mass corresponds to the left breast screening mammogram finding. BI-RADS 4: Suspicious. Recommend ultrasound-guided biopsy.   BI-RADS Category:   Overall: 4 - Suspicious  Recommendation:  Ultrasound guided biopsy for the left breast     Findings of US: In the left breast 04:00 o'clock axis 5 cm from the nipple, there is an irregular hypoechoic mass with indistinct margins measuring 0.5 cm by ultrasound. This corresponds to the left breast mammographic finding. No left axillary adenopathy.   Impression:  Left breast 04:00 o'clock axis mass corresponds to the left breast screening mammogram finding. BI-RADS 4: Suspicious. Recommend ultrasound-guided biopsy.   BI-RADS Category:   Overall: 4 - Suspicious     PATHOLOGY  L breast biopsy (08/2020):  LEFT BREAST, MASS, CORE BIOPSY:  Invasive ductal carcinoma, [place] histologic grade 1 (tubular formation: 1, nuclear pleomorphism: 2,  mitotic activity: 1).  Comment: Most of the biopsy tissue is involved by well-differentiated invasive ductal carcinoma. A P63  stain is negative within infiltrating glands which supports the diagnosis. Microcalcifications are identified in  association with  invasive carcinoma. The largest continuous focus of invasive carcinoma present measures  7 mm. Dr WALTER Monsivais also reviewed this case and agrees with the diagnosis.  BREAST BIOMARKER RESULTS  Estrogen receptor (ER): Positive (nearly 100%, strong)  Progesterone receptor (ID): Positive (nearly 100%, strong)  HER2 IHC: Negative (0)  Ki-67 proliferation index: 5%    Assessment:       1. Infiltrating ductal carcinoma of left breast      Plan:   79 years old female patient with a diagnosis of stage IA ER+ HER2 - left breast cancer.   As noted in Dr. Benites's notes, He had discussion with the patient and giving her age, radiation therapy would not be necessary (as per date from CALGB 9343).   This is in-line with NCCN guidelines. Breast irradiation may be omitted in patients >70 years old with T1 tumors, and clinically node-negative, who receive adjuvant endocrine therapy (category 1).   We re-visited this issue with the patient and her nephew during the initial visit, and both agreed to forgo the radiation.    We recommended aromatase inhibitor; anastrozole 1 mg daily, to complete 5 years.   The benefits and side effects of endocrine therapy were discussed with the patient and her family, and they agreed to start it  - Treatment started on 10/1/2020.     She is tolerating well, with no side effects reported     DEXA scan done on 11/02 is still pending.     Return in 3 months for a follow-up.     The above was staffed and discussed with Dr. Iza Otero MD  Hematology/Oncology fellow. PGY IV

## 2020-11-05 ENCOUNTER — OFFICE VISIT (OUTPATIENT)
Dept: HEMATOLOGY/ONCOLOGY | Facility: CLINIC | Age: 80
End: 2020-11-05
Payer: COMMERCIAL

## 2020-11-05 VITALS — HEART RATE: 84 BPM | TEMPERATURE: 98 F | DIASTOLIC BLOOD PRESSURE: 52 MMHG | SYSTOLIC BLOOD PRESSURE: 134 MMHG

## 2020-11-05 DIAGNOSIS — C50.912 INFILTRATING DUCTAL CARCINOMA OF LEFT BREAST: Primary | ICD-10-CM

## 2020-11-05 PROCEDURE — 1159F PR MEDICATION LIST DOCUMENTED IN MEDICAL RECORD: ICD-10-PCS | Mod: S$GLB,,, | Performed by: STUDENT IN AN ORGANIZED HEALTH CARE EDUCATION/TRAINING PROGRAM

## 2020-11-05 PROCEDURE — 1159F MED LIST DOCD IN RCRD: CPT | Mod: S$GLB,,, | Performed by: STUDENT IN AN ORGANIZED HEALTH CARE EDUCATION/TRAINING PROGRAM

## 2020-11-05 PROCEDURE — 99214 PR OFFICE/OUTPT VISIT, EST, LEVL IV, 30-39 MIN: ICD-10-PCS | Mod: S$GLB,,, | Performed by: STUDENT IN AN ORGANIZED HEALTH CARE EDUCATION/TRAINING PROGRAM

## 2020-11-05 PROCEDURE — 99999 PR PBB SHADOW E&M-EST. PATIENT-LVL IV: CPT | Mod: PBBFAC,,, | Performed by: STUDENT IN AN ORGANIZED HEALTH CARE EDUCATION/TRAINING PROGRAM

## 2020-11-05 PROCEDURE — 99999 PR PBB SHADOW E&M-EST. PATIENT-LVL IV: ICD-10-PCS | Mod: PBBFAC,,, | Performed by: STUDENT IN AN ORGANIZED HEALTH CARE EDUCATION/TRAINING PROGRAM

## 2020-11-05 PROCEDURE — 1101F PR PT FALLS ASSESS DOC 0-1 FALLS W/OUT INJ PAST YR: ICD-10-PCS | Mod: CPTII,S$GLB,, | Performed by: STUDENT IN AN ORGANIZED HEALTH CARE EDUCATION/TRAINING PROGRAM

## 2020-11-05 PROCEDURE — 1126F AMNT PAIN NOTED NONE PRSNT: CPT | Mod: S$GLB,,, | Performed by: STUDENT IN AN ORGANIZED HEALTH CARE EDUCATION/TRAINING PROGRAM

## 2020-11-05 PROCEDURE — 99214 OFFICE O/P EST MOD 30 MIN: CPT | Mod: S$GLB,,, | Performed by: STUDENT IN AN ORGANIZED HEALTH CARE EDUCATION/TRAINING PROGRAM

## 2020-11-05 PROCEDURE — 1126F PR PAIN SEVERITY QUANTIFIED, NO PAIN PRESENT: ICD-10-PCS | Mod: S$GLB,,, | Performed by: STUDENT IN AN ORGANIZED HEALTH CARE EDUCATION/TRAINING PROGRAM

## 2020-11-05 PROCEDURE — 1101F PT FALLS ASSESS-DOCD LE1/YR: CPT | Mod: CPTII,S$GLB,, | Performed by: STUDENT IN AN ORGANIZED HEALTH CARE EDUCATION/TRAINING PROGRAM

## 2020-11-18 ENCOUNTER — LAB VISIT (OUTPATIENT)
Dept: LAB | Facility: OTHER | Age: 80
End: 2020-11-18
Payer: MEDICARE

## 2020-11-18 DIAGNOSIS — Z03.818 ENCOUNTER FOR OBSERVATION FOR SUSPECTED EXPOSURE TO OTHER BIOLOGICAL AGENTS RULED OUT: ICD-10-CM

## 2020-11-18 PROCEDURE — U0003 INFECTIOUS AGENT DETECTION BY NUCLEIC ACID (DNA OR RNA); SEVERE ACUTE RESPIRATORY SYNDROME CORONAVIRUS 2 (SARS-COV-2) (CORONAVIRUS DISEASE [COVID-19]), AMPLIFIED PROBE TECHNIQUE, MAKING USE OF HIGH THROUGHPUT TECHNOLOGIES AS DESCRIBED BY CMS-2020-01-R: HCPCS

## 2020-11-21 LAB — SARS-COV-2 RNA RESP QL NAA+PROBE: NOT DETECTED

## 2020-12-04 ENCOUNTER — LAB VISIT (OUTPATIENT)
Dept: LAB | Facility: OTHER | Age: 80
End: 2020-12-04
Payer: MEDICARE

## 2020-12-04 DIAGNOSIS — Z03.818 ENCOUNTER FOR OBSERVATION FOR SUSPECTED EXPOSURE TO OTHER BIOLOGICAL AGENTS RULED OUT: ICD-10-CM

## 2020-12-04 PROCEDURE — U0003 INFECTIOUS AGENT DETECTION BY NUCLEIC ACID (DNA OR RNA); SEVERE ACUTE RESPIRATORY SYNDROME CORONAVIRUS 2 (SARS-COV-2) (CORONAVIRUS DISEASE [COVID-19]), AMPLIFIED PROBE TECHNIQUE, MAKING USE OF HIGH THROUGHPUT TECHNOLOGIES AS DESCRIBED BY CMS-2020-01-R: HCPCS

## 2020-12-05 LAB — SARS-COV-2 RNA RESP QL NAA+PROBE: NOT DETECTED

## 2020-12-15 ENCOUNTER — LAB VISIT (OUTPATIENT)
Dept: LAB | Facility: OTHER | Age: 80
End: 2020-12-15
Payer: MEDICARE

## 2020-12-15 DIAGNOSIS — Z03.818 ENCOUNTER FOR OBSERVATION FOR SUSPECTED EXPOSURE TO OTHER BIOLOGICAL AGENTS RULED OUT: ICD-10-CM

## 2020-12-15 PROCEDURE — U0003 INFECTIOUS AGENT DETECTION BY NUCLEIC ACID (DNA OR RNA); SEVERE ACUTE RESPIRATORY SYNDROME CORONAVIRUS 2 (SARS-COV-2) (CORONAVIRUS DISEASE [COVID-19]), AMPLIFIED PROBE TECHNIQUE, MAKING USE OF HIGH THROUGHPUT TECHNOLOGIES AS DESCRIBED BY CMS-2020-01-R: HCPCS

## 2020-12-17 LAB — SARS-COV-2 RNA RESP QL NAA+PROBE: NOT DETECTED

## 2021-01-08 ENCOUNTER — LAB VISIT (OUTPATIENT)
Dept: LAB | Facility: OTHER | Age: 81
End: 2021-01-08
Payer: MEDICARE

## 2021-01-08 DIAGNOSIS — Z03.818 ENCOUNTER FOR OBSERVATION FOR SUSPECTED EXPOSURE TO OTHER BIOLOGICAL AGENTS RULED OUT: ICD-10-CM

## 2021-01-08 PROCEDURE — U0003 INFECTIOUS AGENT DETECTION BY NUCLEIC ACID (DNA OR RNA); SEVERE ACUTE RESPIRATORY SYNDROME CORONAVIRUS 2 (SARS-COV-2) (CORONAVIRUS DISEASE [COVID-19]), AMPLIFIED PROBE TECHNIQUE, MAKING USE OF HIGH THROUGHPUT TECHNOLOGIES AS DESCRIBED BY CMS-2020-01-R: HCPCS

## 2021-01-11 LAB — SARS-COV-2 RNA RESP QL NAA+PROBE: NOT DETECTED

## 2021-01-22 ENCOUNTER — LAB VISIT (OUTPATIENT)
Dept: LAB | Facility: OTHER | Age: 81
End: 2021-01-22
Payer: MEDICARE

## 2021-01-22 DIAGNOSIS — Z20.822 ENCOUNTER FOR LABORATORY TESTING FOR COVID-19 VIRUS: ICD-10-CM

## 2021-01-22 PROCEDURE — U0003 INFECTIOUS AGENT DETECTION BY NUCLEIC ACID (DNA OR RNA); SEVERE ACUTE RESPIRATORY SYNDROME CORONAVIRUS 2 (SARS-COV-2) (CORONAVIRUS DISEASE [COVID-19]), AMPLIFIED PROBE TECHNIQUE, MAKING USE OF HIGH THROUGHPUT TECHNOLOGIES AS DESCRIBED BY CMS-2020-01-R: HCPCS

## 2021-01-24 LAB — SARS-COV-2 RNA RESP QL NAA+PROBE: NOT DETECTED

## 2021-01-29 ENCOUNTER — LAB VISIT (OUTPATIENT)
Dept: LAB | Facility: OTHER | Age: 81
End: 2021-01-29
Payer: MEDICARE

## 2021-01-29 DIAGNOSIS — Z20.822 ENCOUNTER FOR LABORATORY TESTING FOR COVID-19 VIRUS: ICD-10-CM

## 2021-01-29 PROCEDURE — U0003 INFECTIOUS AGENT DETECTION BY NUCLEIC ACID (DNA OR RNA); SEVERE ACUTE RESPIRATORY SYNDROME CORONAVIRUS 2 (SARS-COV-2) (CORONAVIRUS DISEASE [COVID-19]), AMPLIFIED PROBE TECHNIQUE, MAKING USE OF HIGH THROUGHPUT TECHNOLOGIES AS DESCRIBED BY CMS-2020-01-R: HCPCS

## 2021-01-31 LAB — SARS-COV-2 RNA RESP QL NAA+PROBE: DETECTED

## 2021-02-01 DIAGNOSIS — U07.1 COVID-19 VIRUS DETECTED: ICD-10-CM

## 2021-02-11 ENCOUNTER — TELEPHONE (OUTPATIENT)
Dept: HEMATOLOGY/ONCOLOGY | Facility: CLINIC | Age: 81
End: 2021-02-11

## 2021-02-15 ENCOUNTER — PES CALL (OUTPATIENT)
Dept: ADMINISTRATIVE | Facility: CLINIC | Age: 81
End: 2021-02-15

## 2021-02-25 ENCOUNTER — OFFICE VISIT (OUTPATIENT)
Dept: HEMATOLOGY/ONCOLOGY | Facility: CLINIC | Age: 81
End: 2021-02-25
Payer: COMMERCIAL

## 2021-02-25 VITALS
DIASTOLIC BLOOD PRESSURE: 61 MMHG | HEART RATE: 72 BPM | SYSTOLIC BLOOD PRESSURE: 133 MMHG | OXYGEN SATURATION: 100 % | BODY MASS INDEX: 26.76 KG/M2 | HEIGHT: 60 IN

## 2021-02-25 DIAGNOSIS — C50.912 INFILTRATING DUCTAL CARCINOMA OF LEFT BREAST: Primary | ICD-10-CM

## 2021-02-25 PROCEDURE — 3288F PR FALLS RISK ASSESSMENT DOCUMENTED: ICD-10-PCS | Mod: CPTII,S$GLB,, | Performed by: STUDENT IN AN ORGANIZED HEALTH CARE EDUCATION/TRAINING PROGRAM

## 2021-02-25 PROCEDURE — 1101F PR PT FALLS ASSESS DOC 0-1 FALLS W/OUT INJ PAST YR: ICD-10-PCS | Mod: CPTII,S$GLB,, | Performed by: STUDENT IN AN ORGANIZED HEALTH CARE EDUCATION/TRAINING PROGRAM

## 2021-02-25 PROCEDURE — 1159F PR MEDICATION LIST DOCUMENTED IN MEDICAL RECORD: ICD-10-PCS | Mod: S$GLB,,, | Performed by: STUDENT IN AN ORGANIZED HEALTH CARE EDUCATION/TRAINING PROGRAM

## 2021-02-25 PROCEDURE — 1101F PT FALLS ASSESS-DOCD LE1/YR: CPT | Mod: CPTII,S$GLB,, | Performed by: STUDENT IN AN ORGANIZED HEALTH CARE EDUCATION/TRAINING PROGRAM

## 2021-02-25 PROCEDURE — 99999 PR PBB SHADOW E&M-EST. PATIENT-LVL III: CPT | Mod: PBBFAC,,, | Performed by: STUDENT IN AN ORGANIZED HEALTH CARE EDUCATION/TRAINING PROGRAM

## 2021-02-25 PROCEDURE — 99213 PR OFFICE/OUTPT VISIT, EST, LEVL III, 20-29 MIN: ICD-10-PCS | Mod: S$GLB,,, | Performed by: STUDENT IN AN ORGANIZED HEALTH CARE EDUCATION/TRAINING PROGRAM

## 2021-02-25 PROCEDURE — 1159F MED LIST DOCD IN RCRD: CPT | Mod: S$GLB,,, | Performed by: STUDENT IN AN ORGANIZED HEALTH CARE EDUCATION/TRAINING PROGRAM

## 2021-02-25 PROCEDURE — 99213 OFFICE O/P EST LOW 20 MIN: CPT | Mod: S$GLB,,, | Performed by: STUDENT IN AN ORGANIZED HEALTH CARE EDUCATION/TRAINING PROGRAM

## 2021-02-25 PROCEDURE — 99999 PR PBB SHADOW E&M-EST. PATIENT-LVL III: ICD-10-PCS | Mod: PBBFAC,,, | Performed by: STUDENT IN AN ORGANIZED HEALTH CARE EDUCATION/TRAINING PROGRAM

## 2021-02-25 PROCEDURE — 1126F AMNT PAIN NOTED NONE PRSNT: CPT | Mod: S$GLB,,, | Performed by: STUDENT IN AN ORGANIZED HEALTH CARE EDUCATION/TRAINING PROGRAM

## 2021-02-25 PROCEDURE — 1126F PR PAIN SEVERITY QUANTIFIED, NO PAIN PRESENT: ICD-10-PCS | Mod: S$GLB,,, | Performed by: STUDENT IN AN ORGANIZED HEALTH CARE EDUCATION/TRAINING PROGRAM

## 2021-02-25 PROCEDURE — 3288F FALL RISK ASSESSMENT DOCD: CPT | Mod: CPTII,S$GLB,, | Performed by: STUDENT IN AN ORGANIZED HEALTH CARE EDUCATION/TRAINING PROGRAM

## 2021-03-05 ENCOUNTER — PATIENT MESSAGE (OUTPATIENT)
Dept: OPTOMETRY | Facility: CLINIC | Age: 81
End: 2021-03-05

## 2021-03-12 ENCOUNTER — OFFICE VISIT (OUTPATIENT)
Dept: SURGERY | Facility: CLINIC | Age: 81
End: 2021-03-12
Payer: MEDICARE

## 2021-03-12 VITALS
WEIGHT: 137 LBS | DIASTOLIC BLOOD PRESSURE: 59 MMHG | SYSTOLIC BLOOD PRESSURE: 116 MMHG | BODY MASS INDEX: 26.76 KG/M2 | HEART RATE: 80 BPM

## 2021-03-12 DIAGNOSIS — Z85.3 HISTORY OF LEFT BREAST CANCER: Primary | ICD-10-CM

## 2021-03-12 PROCEDURE — 99999 PR PBB SHADOW E&M-EST. PATIENT-LVL III: ICD-10-PCS | Mod: PBBFAC,,, | Performed by: PHYSICIAN ASSISTANT

## 2021-03-12 PROCEDURE — 1159F MED LIST DOCD IN RCRD: CPT | Mod: S$GLB,,, | Performed by: PHYSICIAN ASSISTANT

## 2021-03-12 PROCEDURE — 1101F PR PT FALLS ASSESS DOC 0-1 FALLS W/OUT INJ PAST YR: ICD-10-PCS | Mod: CPTII,S$GLB,, | Performed by: PHYSICIAN ASSISTANT

## 2021-03-12 PROCEDURE — 1101F PT FALLS ASSESS-DOCD LE1/YR: CPT | Mod: CPTII,S$GLB,, | Performed by: PHYSICIAN ASSISTANT

## 2021-03-12 PROCEDURE — 99213 PR OFFICE/OUTPT VISIT, EST, LEVL III, 20-29 MIN: ICD-10-PCS | Mod: S$GLB,,, | Performed by: PHYSICIAN ASSISTANT

## 2021-03-12 PROCEDURE — 3288F FALL RISK ASSESSMENT DOCD: CPT | Mod: CPTII,S$GLB,, | Performed by: PHYSICIAN ASSISTANT

## 2021-03-12 PROCEDURE — 1126F PR PAIN SEVERITY QUANTIFIED, NO PAIN PRESENT: ICD-10-PCS | Mod: S$GLB,,, | Performed by: PHYSICIAN ASSISTANT

## 2021-03-12 PROCEDURE — 3288F PR FALLS RISK ASSESSMENT DOCUMENTED: ICD-10-PCS | Mod: CPTII,S$GLB,, | Performed by: PHYSICIAN ASSISTANT

## 2021-03-12 PROCEDURE — 99213 OFFICE O/P EST LOW 20 MIN: CPT | Mod: S$GLB,,, | Performed by: PHYSICIAN ASSISTANT

## 2021-03-12 PROCEDURE — 1159F PR MEDICATION LIST DOCUMENTED IN MEDICAL RECORD: ICD-10-PCS | Mod: S$GLB,,, | Performed by: PHYSICIAN ASSISTANT

## 2021-03-12 PROCEDURE — 99999 PR PBB SHADOW E&M-EST. PATIENT-LVL III: CPT | Mod: PBBFAC,,, | Performed by: PHYSICIAN ASSISTANT

## 2021-03-12 PROCEDURE — 1126F AMNT PAIN NOTED NONE PRSNT: CPT | Mod: S$GLB,,, | Performed by: PHYSICIAN ASSISTANT

## 2021-04-14 ENCOUNTER — OFFICE VISIT (OUTPATIENT)
Dept: INTERNAL MEDICINE | Facility: CLINIC | Age: 81
End: 2021-04-14
Payer: MEDICARE

## 2021-04-14 VITALS
HEIGHT: 60 IN | SYSTOLIC BLOOD PRESSURE: 118 MMHG | DIASTOLIC BLOOD PRESSURE: 56 MMHG | HEART RATE: 88 BPM | OXYGEN SATURATION: 97 % | BODY MASS INDEX: 28.65 KG/M2 | WEIGHT: 145.94 LBS

## 2021-04-14 DIAGNOSIS — F39 MOOD DISORDER: ICD-10-CM

## 2021-04-14 DIAGNOSIS — F20.89 OTHER SCHIZOPHRENIA: ICD-10-CM

## 2021-04-14 DIAGNOSIS — F70 MILD INTELLECTUAL DISABILITY: ICD-10-CM

## 2021-04-14 DIAGNOSIS — Z00.00 ROUTINE GENERAL MEDICAL EXAMINATION AT A HEALTH CARE FACILITY: Primary | ICD-10-CM

## 2021-04-14 DIAGNOSIS — C50.912 INFILTRATING DUCTAL CARCINOMA OF LEFT BREAST: ICD-10-CM

## 2021-04-14 DIAGNOSIS — M81.0 AGE-RELATED OSTEOPOROSIS WITHOUT CURRENT PATHOLOGICAL FRACTURE: ICD-10-CM

## 2021-04-14 PROCEDURE — 1126F AMNT PAIN NOTED NONE PRSNT: CPT | Mod: S$GLB,,, | Performed by: INTERNAL MEDICINE

## 2021-04-14 PROCEDURE — 99999 PR PBB SHADOW E&M-EST. PATIENT-LVL III: CPT | Mod: PBBFAC,,, | Performed by: INTERNAL MEDICINE

## 2021-04-14 PROCEDURE — 99397 PR PREVENTIVE VISIT,EST,65 & OVER: ICD-10-PCS | Mod: S$GLB,,, | Performed by: INTERNAL MEDICINE

## 2021-04-14 PROCEDURE — 3288F PR FALLS RISK ASSESSMENT DOCUMENTED: ICD-10-PCS | Mod: CPTII,S$GLB,, | Performed by: INTERNAL MEDICINE

## 2021-04-14 PROCEDURE — 99499 UNLISTED E&M SERVICE: CPT | Mod: S$GLB,,, | Performed by: INTERNAL MEDICINE

## 2021-04-14 PROCEDURE — 99397 PER PM REEVAL EST PAT 65+ YR: CPT | Mod: S$GLB,,, | Performed by: INTERNAL MEDICINE

## 2021-04-14 PROCEDURE — 1100F PTFALLS ASSESS-DOCD GE2>/YR: CPT | Mod: CPTII,S$GLB,, | Performed by: INTERNAL MEDICINE

## 2021-04-14 PROCEDURE — 99499 RISK ADDL DX/OHS AUDIT: ICD-10-PCS | Mod: S$GLB,,, | Performed by: INTERNAL MEDICINE

## 2021-04-14 PROCEDURE — 3288F FALL RISK ASSESSMENT DOCD: CPT | Mod: CPTII,S$GLB,, | Performed by: INTERNAL MEDICINE

## 2021-04-14 PROCEDURE — 99999 PR PBB SHADOW E&M-EST. PATIENT-LVL III: ICD-10-PCS | Mod: PBBFAC,,, | Performed by: INTERNAL MEDICINE

## 2021-04-14 PROCEDURE — 1126F PR PAIN SEVERITY QUANTIFIED, NO PAIN PRESENT: ICD-10-PCS | Mod: S$GLB,,, | Performed by: INTERNAL MEDICINE

## 2021-04-14 PROCEDURE — 1100F PR PT FALLS ASSESS DOC 2+ FALLS/FALL W/INJURY/YR: ICD-10-PCS | Mod: CPTII,S$GLB,, | Performed by: INTERNAL MEDICINE

## 2021-05-07 ENCOUNTER — LAB VISIT (OUTPATIENT)
Dept: LAB | Facility: OTHER | Age: 81
End: 2021-05-07
Payer: MEDICARE

## 2021-05-07 DIAGNOSIS — Z20.822 ENCOUNTER FOR LABORATORY TESTING FOR COVID-19 VIRUS: ICD-10-CM

## 2021-05-07 PROCEDURE — U0003 INFECTIOUS AGENT DETECTION BY NUCLEIC ACID (DNA OR RNA); SEVERE ACUTE RESPIRATORY SYNDROME CORONAVIRUS 2 (SARS-COV-2) (CORONAVIRUS DISEASE [COVID-19]), AMPLIFIED PROBE TECHNIQUE, MAKING USE OF HIGH THROUGHPUT TECHNOLOGIES AS DESCRIBED BY CMS-2020-01-R: HCPCS | Performed by: NURSE PRACTITIONER

## 2021-05-08 LAB — SARS-COV-2 RNA RESP QL NAA+PROBE: NOT DETECTED

## 2021-05-14 ENCOUNTER — PATIENT OUTREACH (OUTPATIENT)
Dept: ADMINISTRATIVE | Facility: OTHER | Age: 81
End: 2021-05-14

## 2021-05-17 ENCOUNTER — OFFICE VISIT (OUTPATIENT)
Dept: OBSTETRICS AND GYNECOLOGY | Facility: CLINIC | Age: 81
End: 2021-05-17
Payer: MEDICARE

## 2021-05-17 VITALS
WEIGHT: 145 LBS | DIASTOLIC BLOOD PRESSURE: 74 MMHG | BODY MASS INDEX: 28.47 KG/M2 | HEIGHT: 60 IN | SYSTOLIC BLOOD PRESSURE: 122 MMHG

## 2021-05-17 DIAGNOSIS — C50.912 INFILTRATING DUCTAL CARCINOMA OF LEFT BREAST: ICD-10-CM

## 2021-05-17 DIAGNOSIS — Z78.0 POSTMENOPAUSE: ICD-10-CM

## 2021-05-17 DIAGNOSIS — Z12.31 ENCOUNTER FOR SCREENING MAMMOGRAM FOR BREAST CANCER: ICD-10-CM

## 2021-05-17 DIAGNOSIS — Z01.419 WOMEN'S ANNUAL ROUTINE GYNECOLOGICAL EXAMINATION: Primary | ICD-10-CM

## 2021-05-17 PROCEDURE — 1126F AMNT PAIN NOTED NONE PRSNT: CPT | Mod: S$GLB,,, | Performed by: PHYSICIAN ASSISTANT

## 2021-05-17 PROCEDURE — G0101 CA SCREEN;PELVIC/BREAST EXAM: HCPCS | Mod: S$GLB,,, | Performed by: PHYSICIAN ASSISTANT

## 2021-05-17 PROCEDURE — 99999 PR PBB SHADOW E&M-EST. PATIENT-LVL III: ICD-10-PCS | Mod: PBBFAC,,, | Performed by: PHYSICIAN ASSISTANT

## 2021-05-17 PROCEDURE — 1126F PR PAIN SEVERITY QUANTIFIED, NO PAIN PRESENT: ICD-10-PCS | Mod: S$GLB,,, | Performed by: PHYSICIAN ASSISTANT

## 2021-05-17 PROCEDURE — 99999 PR PBB SHADOW E&M-EST. PATIENT-LVL III: CPT | Mod: PBBFAC,,, | Performed by: PHYSICIAN ASSISTANT

## 2021-05-17 PROCEDURE — G0101 PR CA SCREEN;PELVIC/BREAST EXAM: ICD-10-PCS | Mod: S$GLB,,, | Performed by: PHYSICIAN ASSISTANT

## 2021-05-19 NOTE — TELEPHONE ENCOUNTER
Patients breast biopsy came back positive for breast cancer.  Spoke with Anel Resendiz at [facility]  Spoke with her nephew, Buzz Villasenor - [phone]    I recommend that she had a mastectomy.  Pt will not be able to tolerate radiation.       
There are no Wet Read(s) to document.

## 2021-06-16 ENCOUNTER — PATIENT OUTREACH (OUTPATIENT)
Dept: ADMINISTRATIVE | Facility: OTHER | Age: 81
End: 2021-06-16

## 2021-06-18 ENCOUNTER — LAB VISIT (OUTPATIENT)
Dept: LAB | Facility: OTHER | Age: 81
End: 2021-06-18
Payer: MEDICARE

## 2021-06-18 DIAGNOSIS — Z20.822 ENCOUNTER FOR LABORATORY TESTING FOR COVID-19 VIRUS: ICD-10-CM

## 2021-06-18 PROCEDURE — U0003 INFECTIOUS AGENT DETECTION BY NUCLEIC ACID (DNA OR RNA); SEVERE ACUTE RESPIRATORY SYNDROME CORONAVIRUS 2 (SARS-COV-2) (CORONAVIRUS DISEASE [COVID-19]), AMPLIFIED PROBE TECHNIQUE, MAKING USE OF HIGH THROUGHPUT TECHNOLOGIES AS DESCRIBED BY CMS-2020-01-R: HCPCS | Performed by: NURSE PRACTITIONER

## 2021-06-19 LAB — SARS-COV-2 RNA RESP QL NAA+PROBE: NOT DETECTED

## 2021-06-21 RX ORDER — ALENDRONATE SODIUM 70 MG/1
TABLET ORAL
Qty: 4 TABLET | Refills: 3 | Status: SHIPPED | OUTPATIENT
Start: 2021-06-21 | End: 2022-12-10

## 2021-06-23 ENCOUNTER — OFFICE VISIT (OUTPATIENT)
Dept: OTOLARYNGOLOGY | Facility: CLINIC | Age: 81
End: 2021-06-23
Payer: MEDICARE

## 2021-06-23 DIAGNOSIS — H61.21 IMPACTED CERUMEN OF RIGHT EAR: Primary | ICD-10-CM

## 2021-06-23 PROCEDURE — 3288F FALL RISK ASSESSMENT DOCD: CPT | Mod: CPTII,S$GLB,, | Performed by: OTOLARYNGOLOGY

## 2021-06-23 PROCEDURE — 1101F PT FALLS ASSESS-DOCD LE1/YR: CPT | Mod: CPTII,S$GLB,, | Performed by: OTOLARYNGOLOGY

## 2021-06-23 PROCEDURE — 1126F AMNT PAIN NOTED NONE PRSNT: CPT | Mod: S$GLB,,, | Performed by: OTOLARYNGOLOGY

## 2021-06-23 PROCEDURE — 1101F PR PT FALLS ASSESS DOC 0-1 FALLS W/OUT INJ PAST YR: ICD-10-PCS | Mod: CPTII,S$GLB,, | Performed by: OTOLARYNGOLOGY

## 2021-06-23 PROCEDURE — 3288F PR FALLS RISK ASSESSMENT DOCUMENTED: ICD-10-PCS | Mod: CPTII,S$GLB,, | Performed by: OTOLARYNGOLOGY

## 2021-06-23 PROCEDURE — 69210 PR REMOVAL IMPACTED CERUMEN REQUIRING INSTRUMENTATION, UNILATERAL: ICD-10-PCS | Mod: S$GLB,,, | Performed by: OTOLARYNGOLOGY

## 2021-06-23 PROCEDURE — 69210 REMOVE IMPACTED EAR WAX UNI: CPT | Mod: S$GLB,,, | Performed by: OTOLARYNGOLOGY

## 2021-06-23 PROCEDURE — 99999 PR PBB SHADOW E&M-EST. PATIENT-LVL III: ICD-10-PCS | Mod: PBBFAC,,, | Performed by: OTOLARYNGOLOGY

## 2021-06-23 PROCEDURE — 1126F PR PAIN SEVERITY QUANTIFIED, NO PAIN PRESENT: ICD-10-PCS | Mod: S$GLB,,, | Performed by: OTOLARYNGOLOGY

## 2021-06-23 PROCEDURE — 99499 UNLISTED E&M SERVICE: CPT | Mod: S$GLB,,, | Performed by: OTOLARYNGOLOGY

## 2021-06-23 PROCEDURE — 99499 NO LOS: ICD-10-PCS | Mod: S$GLB,,, | Performed by: OTOLARYNGOLOGY

## 2021-06-23 PROCEDURE — 99999 PR PBB SHADOW E&M-EST. PATIENT-LVL III: CPT | Mod: PBBFAC,,, | Performed by: OTOLARYNGOLOGY

## 2021-06-24 ENCOUNTER — OFFICE VISIT (OUTPATIENT)
Dept: HEMATOLOGY/ONCOLOGY | Facility: CLINIC | Age: 81
End: 2021-06-24
Payer: MEDICARE

## 2021-06-24 VITALS
HEART RATE: 95 BPM | TEMPERATURE: 98 F | RESPIRATION RATE: 18 BRPM | DIASTOLIC BLOOD PRESSURE: 76 MMHG | SYSTOLIC BLOOD PRESSURE: 142 MMHG | HEIGHT: 61 IN | OXYGEN SATURATION: 97 % | WEIGHT: 144.38 LBS | BODY MASS INDEX: 27.26 KG/M2

## 2021-06-24 DIAGNOSIS — C50.912 INFILTRATING DUCTAL CARCINOMA OF LEFT BREAST: Primary | ICD-10-CM

## 2021-06-24 PROCEDURE — 1101F PT FALLS ASSESS-DOCD LE1/YR: CPT | Mod: CPTII,GC,S$GLB, | Performed by: STUDENT IN AN ORGANIZED HEALTH CARE EDUCATION/TRAINING PROGRAM

## 2021-06-24 PROCEDURE — 3288F PR FALLS RISK ASSESSMENT DOCUMENTED: ICD-10-PCS | Mod: CPTII,GC,S$GLB, | Performed by: STUDENT IN AN ORGANIZED HEALTH CARE EDUCATION/TRAINING PROGRAM

## 2021-06-24 PROCEDURE — 99213 OFFICE O/P EST LOW 20 MIN: CPT | Mod: GC,S$GLB,, | Performed by: STUDENT IN AN ORGANIZED HEALTH CARE EDUCATION/TRAINING PROGRAM

## 2021-06-24 PROCEDURE — 1159F MED LIST DOCD IN RCRD: CPT | Mod: GC,S$GLB,, | Performed by: STUDENT IN AN ORGANIZED HEALTH CARE EDUCATION/TRAINING PROGRAM

## 2021-06-24 PROCEDURE — 99213 PR OFFICE/OUTPT VISIT, EST, LEVL III, 20-29 MIN: ICD-10-PCS | Mod: GC,S$GLB,, | Performed by: STUDENT IN AN ORGANIZED HEALTH CARE EDUCATION/TRAINING PROGRAM

## 2021-06-24 PROCEDURE — 1126F AMNT PAIN NOTED NONE PRSNT: CPT | Mod: GC,S$GLB,, | Performed by: STUDENT IN AN ORGANIZED HEALTH CARE EDUCATION/TRAINING PROGRAM

## 2021-06-24 PROCEDURE — 99999 PR PBB SHADOW E&M-EST. PATIENT-LVL IV: CPT | Mod: PBBFAC,GC,, | Performed by: STUDENT IN AN ORGANIZED HEALTH CARE EDUCATION/TRAINING PROGRAM

## 2021-06-24 PROCEDURE — 99999 PR PBB SHADOW E&M-EST. PATIENT-LVL IV: ICD-10-PCS | Mod: PBBFAC,GC,, | Performed by: STUDENT IN AN ORGANIZED HEALTH CARE EDUCATION/TRAINING PROGRAM

## 2021-06-24 PROCEDURE — 1101F PR PT FALLS ASSESS DOC 0-1 FALLS W/OUT INJ PAST YR: ICD-10-PCS | Mod: CPTII,GC,S$GLB, | Performed by: STUDENT IN AN ORGANIZED HEALTH CARE EDUCATION/TRAINING PROGRAM

## 2021-06-24 PROCEDURE — 1159F PR MEDICATION LIST DOCUMENTED IN MEDICAL RECORD: ICD-10-PCS | Mod: GC,S$GLB,, | Performed by: STUDENT IN AN ORGANIZED HEALTH CARE EDUCATION/TRAINING PROGRAM

## 2021-06-24 PROCEDURE — 1126F PR PAIN SEVERITY QUANTIFIED, NO PAIN PRESENT: ICD-10-PCS | Mod: GC,S$GLB,, | Performed by: STUDENT IN AN ORGANIZED HEALTH CARE EDUCATION/TRAINING PROGRAM

## 2021-06-24 PROCEDURE — 3288F FALL RISK ASSESSMENT DOCD: CPT | Mod: CPTII,GC,S$GLB, | Performed by: STUDENT IN AN ORGANIZED HEALTH CARE EDUCATION/TRAINING PROGRAM

## 2021-07-02 ENCOUNTER — LAB VISIT (OUTPATIENT)
Dept: LAB | Facility: OTHER | Age: 81
End: 2021-07-02
Payer: MEDICARE

## 2021-07-02 DIAGNOSIS — Z20.822 ENCOUNTER FOR LABORATORY TESTING FOR COVID-19 VIRUS: ICD-10-CM

## 2021-07-02 PROCEDURE — U0003 INFECTIOUS AGENT DETECTION BY NUCLEIC ACID (DNA OR RNA); SEVERE ACUTE RESPIRATORY SYNDROME CORONAVIRUS 2 (SARS-COV-2) (CORONAVIRUS DISEASE [COVID-19]), AMPLIFIED PROBE TECHNIQUE, MAKING USE OF HIGH THROUGHPUT TECHNOLOGIES AS DESCRIBED BY CMS-2020-01-R: HCPCS | Performed by: NURSE PRACTITIONER

## 2021-07-03 LAB — SARS-COV-2 RNA RESP QL NAA+PROBE: NOT DETECTED

## 2021-07-19 ENCOUNTER — HOSPITAL ENCOUNTER (OUTPATIENT)
Dept: RADIOLOGY | Facility: HOSPITAL | Age: 81
Discharge: HOME | End: 2021-07-19
Attending: STUDENT IN AN ORGANIZED HEALTH CARE EDUCATION/TRAINING PROGRAM
Payer: MEDICARE

## 2021-07-19 DIAGNOSIS — C50.912 INFILTRATING DUCTAL CARCINOMA OF LEFT BREAST: ICD-10-CM

## 2021-07-19 PROCEDURE — 77062 BREAST TOMOSYNTHESIS BI: CPT | Mod: 26,GA,, | Performed by: RADIOLOGY

## 2021-07-19 PROCEDURE — 77062 MAMMO DIGITAL DIAGNOSTIC BILAT WITH TOMO: ICD-10-PCS | Mod: 26,GA,, | Performed by: RADIOLOGY

## 2021-07-19 PROCEDURE — 77066 MAMMO DIGITAL DIAGNOSTIC BILAT WITH TOMO: ICD-10-PCS | Mod: 26,GA,, | Performed by: RADIOLOGY

## 2021-07-19 PROCEDURE — 77066 DX MAMMO INCL CAD BI: CPT | Mod: 26,GA,, | Performed by: RADIOLOGY

## 2021-07-19 PROCEDURE — 77062 BREAST TOMOSYNTHESIS BI: CPT | Mod: GA,TC

## 2021-07-23 ENCOUNTER — LAB VISIT (OUTPATIENT)
Dept: LAB | Facility: OTHER | Age: 81
End: 2021-07-23
Payer: MEDICARE

## 2021-07-23 DIAGNOSIS — Z20.822 ENCOUNTER FOR LABORATORY TESTING FOR COVID-19 VIRUS: ICD-10-CM

## 2021-07-23 PROCEDURE — U0003 INFECTIOUS AGENT DETECTION BY NUCLEIC ACID (DNA OR RNA); SEVERE ACUTE RESPIRATORY SYNDROME CORONAVIRUS 2 (SARS-COV-2) (CORONAVIRUS DISEASE [COVID-19]), AMPLIFIED PROBE TECHNIQUE, MAKING USE OF HIGH THROUGHPUT TECHNOLOGIES AS DESCRIBED BY CMS-2020-01-R: HCPCS | Performed by: NURSE PRACTITIONER

## 2021-07-24 LAB
SARS-COV-2 RNA RESP QL NAA+PROBE: NOT DETECTED
SARS-COV-2- CYCLE NUMBER: -1

## 2021-08-02 DIAGNOSIS — C50.912 INFILTRATING DUCTAL CARCINOMA OF LEFT BREAST: ICD-10-CM

## 2021-08-02 RX ORDER — ANASTROZOLE 1 MG/1
1 TABLET ORAL DAILY
Qty: 90 TABLET | Refills: 3 | Status: SHIPPED | OUTPATIENT
Start: 2021-08-02 | End: 2022-07-27 | Stop reason: SDUPTHER

## 2021-08-06 ENCOUNTER — LAB VISIT (OUTPATIENT)
Dept: LAB | Facility: OTHER | Age: 81
End: 2021-08-06
Payer: MEDICARE

## 2021-08-06 DIAGNOSIS — Z20.822 ENCOUNTER FOR LABORATORY TESTING FOR COVID-19 VIRUS: ICD-10-CM

## 2021-08-06 PROCEDURE — U0003 INFECTIOUS AGENT DETECTION BY NUCLEIC ACID (DNA OR RNA); SEVERE ACUTE RESPIRATORY SYNDROME CORONAVIRUS 2 (SARS-COV-2) (CORONAVIRUS DISEASE [COVID-19]), AMPLIFIED PROBE TECHNIQUE, MAKING USE OF HIGH THROUGHPUT TECHNOLOGIES AS DESCRIBED BY CMS-2020-01-R: HCPCS | Performed by: NURSE PRACTITIONER

## 2021-08-07 LAB
SARS-COV-2 RNA RESP QL NAA+PROBE: NOT DETECTED
SARS-COV-2- CYCLE NUMBER: -1

## 2021-08-26 ENCOUNTER — LAB VISIT (OUTPATIENT)
Dept: LAB | Facility: OTHER | Age: 81
End: 2021-08-26
Payer: MEDICARE

## 2021-08-26 DIAGNOSIS — Z20.822 ENCOUNTER FOR LABORATORY TESTING FOR COVID-19 VIRUS: ICD-10-CM

## 2021-08-26 PROCEDURE — U0003 INFECTIOUS AGENT DETECTION BY NUCLEIC ACID (DNA OR RNA); SEVERE ACUTE RESPIRATORY SYNDROME CORONAVIRUS 2 (SARS-COV-2) (CORONAVIRUS DISEASE [COVID-19]), AMPLIFIED PROBE TECHNIQUE, MAKING USE OF HIGH THROUGHPUT TECHNOLOGIES AS DESCRIBED BY CMS-2020-01-R: HCPCS | Performed by: NURSE PRACTITIONER

## 2021-08-28 LAB
SARS-COV-2 RNA RESP QL NAA+PROBE: NOT DETECTED
SARS-COV-2- CYCLE NUMBER: NORMAL

## 2021-09-16 ENCOUNTER — LAB VISIT (OUTPATIENT)
Dept: LAB | Facility: OTHER | Age: 81
End: 2021-09-16
Payer: MEDICARE

## 2021-09-16 DIAGNOSIS — Z20.822 ENCOUNTER FOR LABORATORY TESTING FOR COVID-19 VIRUS: ICD-10-CM

## 2021-09-16 PROCEDURE — U0003 INFECTIOUS AGENT DETECTION BY NUCLEIC ACID (DNA OR RNA); SEVERE ACUTE RESPIRATORY SYNDROME CORONAVIRUS 2 (SARS-COV-2) (CORONAVIRUS DISEASE [COVID-19]), AMPLIFIED PROBE TECHNIQUE, MAKING USE OF HIGH THROUGHPUT TECHNOLOGIES AS DESCRIBED BY CMS-2020-01-R: HCPCS | Mod: HCNC | Performed by: NURSE PRACTITIONER

## 2021-09-17 LAB
SARS-COV-2 RNA RESP QL NAA+PROBE: NOT DETECTED
SARS-COV-2- CYCLE NUMBER: NORMAL

## 2021-09-23 ENCOUNTER — LAB VISIT (OUTPATIENT)
Dept: LAB | Facility: OTHER | Age: 81
End: 2021-09-23
Payer: MEDICARE

## 2021-09-23 ENCOUNTER — OFFICE VISIT (OUTPATIENT)
Dept: HEMATOLOGY/ONCOLOGY | Facility: CLINIC | Age: 81
End: 2021-09-23
Payer: MEDICARE

## 2021-09-23 VITALS
SYSTOLIC BLOOD PRESSURE: 133 MMHG | TEMPERATURE: 98 F | BODY MASS INDEX: 28.26 KG/M2 | HEART RATE: 80 BPM | RESPIRATION RATE: 16 BRPM | HEIGHT: 60 IN | WEIGHT: 143.94 LBS | DIASTOLIC BLOOD PRESSURE: 62 MMHG | OXYGEN SATURATION: 100 %

## 2021-09-23 DIAGNOSIS — Z20.822 ENCOUNTER FOR LABORATORY TESTING FOR COVID-19 VIRUS: ICD-10-CM

## 2021-09-23 DIAGNOSIS — C50.912 INFILTRATING DUCTAL CARCINOMA OF LEFT BREAST: Primary | ICD-10-CM

## 2021-09-23 PROCEDURE — 99999 PR PBB SHADOW E&M-EST. PATIENT-LVL IV: ICD-10-PCS | Mod: PBBFAC,HCNC,GC, | Performed by: STUDENT IN AN ORGANIZED HEALTH CARE EDUCATION/TRAINING PROGRAM

## 2021-09-23 PROCEDURE — U0003 INFECTIOUS AGENT DETECTION BY NUCLEIC ACID (DNA OR RNA); SEVERE ACUTE RESPIRATORY SYNDROME CORONAVIRUS 2 (SARS-COV-2) (CORONAVIRUS DISEASE [COVID-19]), AMPLIFIED PROBE TECHNIQUE, MAKING USE OF HIGH THROUGHPUT TECHNOLOGIES AS DESCRIBED BY CMS-2020-01-R: HCPCS | Mod: HCNC | Performed by: NURSE PRACTITIONER

## 2021-09-23 PROCEDURE — 3075F PR MOST RECENT SYSTOLIC BLOOD PRESS GE 130-139MM HG: ICD-10-PCS | Mod: HCNC,CPTII,GC,S$GLB | Performed by: STUDENT IN AN ORGANIZED HEALTH CARE EDUCATION/TRAINING PROGRAM

## 2021-09-23 PROCEDURE — 1159F PR MEDICATION LIST DOCUMENTED IN MEDICAL RECORD: ICD-10-PCS | Mod: HCNC,CPTII,GC,S$GLB | Performed by: STUDENT IN AN ORGANIZED HEALTH CARE EDUCATION/TRAINING PROGRAM

## 2021-09-23 PROCEDURE — 3288F PR FALLS RISK ASSESSMENT DOCUMENTED: ICD-10-PCS | Mod: HCNC,CPTII,GC,S$GLB | Performed by: STUDENT IN AN ORGANIZED HEALTH CARE EDUCATION/TRAINING PROGRAM

## 2021-09-23 PROCEDURE — 1160F RVW MEDS BY RX/DR IN RCRD: CPT | Mod: HCNC,CPTII,GC,S$GLB | Performed by: STUDENT IN AN ORGANIZED HEALTH CARE EDUCATION/TRAINING PROGRAM

## 2021-09-23 PROCEDURE — 99214 PR OFFICE/OUTPT VISIT, EST, LEVL IV, 30-39 MIN: ICD-10-PCS | Mod: HCNC,GC,S$GLB, | Performed by: STUDENT IN AN ORGANIZED HEALTH CARE EDUCATION/TRAINING PROGRAM

## 2021-09-23 PROCEDURE — 1101F PR PT FALLS ASSESS DOC 0-1 FALLS W/OUT INJ PAST YR: ICD-10-PCS | Mod: HCNC,CPTII,GC,S$GLB | Performed by: STUDENT IN AN ORGANIZED HEALTH CARE EDUCATION/TRAINING PROGRAM

## 2021-09-23 PROCEDURE — 3075F SYST BP GE 130 - 139MM HG: CPT | Mod: HCNC,CPTII,GC,S$GLB | Performed by: STUDENT IN AN ORGANIZED HEALTH CARE EDUCATION/TRAINING PROGRAM

## 2021-09-23 PROCEDURE — 1101F PT FALLS ASSESS-DOCD LE1/YR: CPT | Mod: HCNC,CPTII,GC,S$GLB | Performed by: STUDENT IN AN ORGANIZED HEALTH CARE EDUCATION/TRAINING PROGRAM

## 2021-09-23 PROCEDURE — 99214 OFFICE O/P EST MOD 30 MIN: CPT | Mod: HCNC,GC,S$GLB, | Performed by: STUDENT IN AN ORGANIZED HEALTH CARE EDUCATION/TRAINING PROGRAM

## 2021-09-23 PROCEDURE — 1160F PR REVIEW ALL MEDS BY PRESCRIBER/CLIN PHARMACIST DOCUMENTED: ICD-10-PCS | Mod: HCNC,CPTII,GC,S$GLB | Performed by: STUDENT IN AN ORGANIZED HEALTH CARE EDUCATION/TRAINING PROGRAM

## 2021-09-23 PROCEDURE — 99999 PR PBB SHADOW E&M-EST. PATIENT-LVL IV: CPT | Mod: PBBFAC,HCNC,GC, | Performed by: STUDENT IN AN ORGANIZED HEALTH CARE EDUCATION/TRAINING PROGRAM

## 2021-09-23 PROCEDURE — 1126F PR PAIN SEVERITY QUANTIFIED, NO PAIN PRESENT: ICD-10-PCS | Mod: HCNC,CPTII,GC,S$GLB | Performed by: STUDENT IN AN ORGANIZED HEALTH CARE EDUCATION/TRAINING PROGRAM

## 2021-09-23 PROCEDURE — 1126F AMNT PAIN NOTED NONE PRSNT: CPT | Mod: HCNC,CPTII,GC,S$GLB | Performed by: STUDENT IN AN ORGANIZED HEALTH CARE EDUCATION/TRAINING PROGRAM

## 2021-09-23 PROCEDURE — 3288F FALL RISK ASSESSMENT DOCD: CPT | Mod: HCNC,CPTII,GC,S$GLB | Performed by: STUDENT IN AN ORGANIZED HEALTH CARE EDUCATION/TRAINING PROGRAM

## 2021-09-23 PROCEDURE — 1159F MED LIST DOCD IN RCRD: CPT | Mod: HCNC,CPTII,GC,S$GLB | Performed by: STUDENT IN AN ORGANIZED HEALTH CARE EDUCATION/TRAINING PROGRAM

## 2021-09-23 PROCEDURE — 3078F PR MOST RECENT DIASTOLIC BLOOD PRESSURE < 80 MM HG: ICD-10-PCS | Mod: HCNC,CPTII,GC,S$GLB | Performed by: STUDENT IN AN ORGANIZED HEALTH CARE EDUCATION/TRAINING PROGRAM

## 2021-09-23 PROCEDURE — 3078F DIAST BP <80 MM HG: CPT | Mod: HCNC,CPTII,GC,S$GLB | Performed by: STUDENT IN AN ORGANIZED HEALTH CARE EDUCATION/TRAINING PROGRAM

## 2021-09-24 LAB
SARS-COV-2 RNA RESP QL NAA+PROBE: NOT DETECTED
SARS-COV-2- CYCLE NUMBER: NORMAL

## 2021-09-29 ENCOUNTER — TELEPHONE (OUTPATIENT)
Dept: HEMATOLOGY/ONCOLOGY | Facility: CLINIC | Age: 81
End: 2021-09-29

## 2021-09-30 ENCOUNTER — LAB VISIT (OUTPATIENT)
Dept: LAB | Facility: OTHER | Age: 81
End: 2021-09-30
Payer: MEDICARE

## 2021-09-30 DIAGNOSIS — Z20.822 ENCOUNTER FOR LABORATORY TESTING FOR COVID-19 VIRUS: ICD-10-CM

## 2021-09-30 PROCEDURE — U0003 INFECTIOUS AGENT DETECTION BY NUCLEIC ACID (DNA OR RNA); SEVERE ACUTE RESPIRATORY SYNDROME CORONAVIRUS 2 (SARS-COV-2) (CORONAVIRUS DISEASE [COVID-19]), AMPLIFIED PROBE TECHNIQUE, MAKING USE OF HIGH THROUGHPUT TECHNOLOGIES AS DESCRIBED BY CMS-2020-01-R: HCPCS | Mod: HCNC | Performed by: NURSE PRACTITIONER

## 2021-10-01 LAB
SARS-COV-2 RNA RESP QL NAA+PROBE: NOT DETECTED
SARS-COV-2- CYCLE NUMBER: NORMAL

## 2021-10-07 ENCOUNTER — LAB VISIT (OUTPATIENT)
Dept: LAB | Facility: OTHER | Age: 81
End: 2021-10-07
Payer: MEDICARE

## 2021-10-07 DIAGNOSIS — Z20.822 ENCOUNTER FOR LABORATORY TESTING FOR COVID-19 VIRUS: ICD-10-CM

## 2021-10-07 PROCEDURE — U0003 INFECTIOUS AGENT DETECTION BY NUCLEIC ACID (DNA OR RNA); SEVERE ACUTE RESPIRATORY SYNDROME CORONAVIRUS 2 (SARS-COV-2) (CORONAVIRUS DISEASE [COVID-19]), AMPLIFIED PROBE TECHNIQUE, MAKING USE OF HIGH THROUGHPUT TECHNOLOGIES AS DESCRIBED BY CMS-2020-01-R: HCPCS | Mod: HCNC | Performed by: NURSE PRACTITIONER

## 2021-10-08 LAB
SARS-COV-2 RNA RESP QL NAA+PROBE: NOT DETECTED
SARS-COV-2- CYCLE NUMBER: NORMAL

## 2021-10-14 ENCOUNTER — LAB VISIT (OUTPATIENT)
Dept: LAB | Facility: OTHER | Age: 81
End: 2021-10-14
Payer: MEDICARE

## 2021-10-14 DIAGNOSIS — Z20.822 ENCOUNTER FOR LABORATORY TESTING FOR COVID-19 VIRUS: ICD-10-CM

## 2021-10-14 PROCEDURE — U0003 INFECTIOUS AGENT DETECTION BY NUCLEIC ACID (DNA OR RNA); SEVERE ACUTE RESPIRATORY SYNDROME CORONAVIRUS 2 (SARS-COV-2) (CORONAVIRUS DISEASE [COVID-19]), AMPLIFIED PROBE TECHNIQUE, MAKING USE OF HIGH THROUGHPUT TECHNOLOGIES AS DESCRIBED BY CMS-2020-01-R: HCPCS | Mod: HCNC | Performed by: NURSE PRACTITIONER

## 2021-10-15 LAB
SARS-COV-2 RNA RESP QL NAA+PROBE: NOT DETECTED
SARS-COV-2- CYCLE NUMBER: NORMAL

## 2021-10-20 NOTE — PROGRESS NOTES
Refill Authorization Note     is requesting a refill authorization.    Brief assessment and rationale for refill: DEFER: not previously prescribed by you   Name and strength of medication: FLUoxetine 20 MG capsule       Medication Therapy Plan: pt may require appt, pharmacy states pt has appt with dr. smith in feb 2020, please defer to PCP    Medication reconciliation completed: No   Pharmacist Review Requested: Yes          How patient will take medication: t1c po qd          Comments:   Requested Prescriptions   Pending Prescriptions Disp Refills    FLUoxetine 20 MG capsule [Pharmacy Med Name: FLUOXETINE HCL 20 MG CAPSULE] 90 capsule 0     Sig: TAKE 1 CAPSULE BY MOUTH every morning       Psychiatry:  Antidepressants - SSRI Failed - 12/19/2019  9:58 AM        Failed - Office visit in past 6 months or future 90 days     Recent Outpatient Visits            1 month ago Closed nondisplaced fracture of lateral malleolus of right fibula with routine healing, subsequent encounter    [facility] - Orthopedics Brennen Zuluaga NP    2 months ago Closed fracture of distal end of right fibula, unspecified fracture morphology, initial encounter    [facility] Brennen Zuluaga NP    4 months ago Well woman exam with routine gynecological exam    [facility] - OB/GYN 5th Floor VIN Mitchell NP    8 months ago Screening for malignant neoplasm of colon    [facility] Carolina Chavez MD    1 year ago Thyroid nodule    [facility] Carolina Chavez MD                    Passed - Patient is at least 18 years old        Passed - Last BP in normal range within 360 days     BP Readings from Last 3 Encounters:   11/07/19 124/63   07/30/19 134/68   04/03/19 (!) 120/56              Appointments  past 12m or future 3m with PCP    Date Provider   Last Visit   4/3/2019 Carolina Chavez MD   Next Visit   No future visit dates  Carolina Chavez MD       
Private car

## 2021-10-21 ENCOUNTER — LAB VISIT (OUTPATIENT)
Dept: LAB | Facility: OTHER | Age: 81
End: 2021-10-21
Payer: MEDICARE

## 2021-10-21 DIAGNOSIS — Z20.822 ENCOUNTER FOR LABORATORY TESTING FOR COVID-19 VIRUS: ICD-10-CM

## 2021-10-21 PROCEDURE — U0003 INFECTIOUS AGENT DETECTION BY NUCLEIC ACID (DNA OR RNA); SEVERE ACUTE RESPIRATORY SYNDROME CORONAVIRUS 2 (SARS-COV-2) (CORONAVIRUS DISEASE [COVID-19]), AMPLIFIED PROBE TECHNIQUE, MAKING USE OF HIGH THROUGHPUT TECHNOLOGIES AS DESCRIBED BY CMS-2020-01-R: HCPCS | Mod: HCNC | Performed by: NURSE PRACTITIONER

## 2021-10-22 LAB
SARS-COV-2 RNA RESP QL NAA+PROBE: NOT DETECTED
SARS-COV-2- CYCLE NUMBER: NORMAL

## 2021-10-26 ENCOUNTER — IMMUNIZATION (OUTPATIENT)
Dept: INTERNAL MEDICINE | Facility: CLINIC | Age: 81
End: 2021-10-26
Payer: MEDICARE

## 2021-10-26 PROCEDURE — G0008 ADMIN INFLUENZA VIRUS VAC: HCPCS | Mod: HCNC,S$GLB,, | Performed by: INTERNAL MEDICINE

## 2021-10-26 PROCEDURE — G0008 FLU VACCINE - QUADRIVALENT - ADJUVANTED: ICD-10-PCS | Mod: HCNC,S$GLB,, | Performed by: INTERNAL MEDICINE

## 2021-10-26 PROCEDURE — 90694 FLU VACCINE - QUADRIVALENT - ADJUVANTED: ICD-10-PCS | Mod: HCNC,S$GLB,, | Performed by: INTERNAL MEDICINE

## 2021-10-26 PROCEDURE — 90694 VACC AIIV4 NO PRSRV 0.5ML IM: CPT | Mod: HCNC,S$GLB,, | Performed by: INTERNAL MEDICINE

## 2021-10-28 ENCOUNTER — LAB VISIT (OUTPATIENT)
Dept: LAB | Facility: OTHER | Age: 81
End: 2021-10-28
Payer: MEDICARE

## 2021-10-28 DIAGNOSIS — Z20.822 ENCOUNTER FOR LABORATORY TESTING FOR COVID-19 VIRUS: ICD-10-CM

## 2021-10-28 PROCEDURE — U0003 INFECTIOUS AGENT DETECTION BY NUCLEIC ACID (DNA OR RNA); SEVERE ACUTE RESPIRATORY SYNDROME CORONAVIRUS 2 (SARS-COV-2) (CORONAVIRUS DISEASE [COVID-19]), AMPLIFIED PROBE TECHNIQUE, MAKING USE OF HIGH THROUGHPUT TECHNOLOGIES AS DESCRIBED BY CMS-2020-01-R: HCPCS | Mod: HCNC | Performed by: NURSE PRACTITIONER

## 2021-10-29 LAB
SARS-COV-2 RNA RESP QL NAA+PROBE: NOT DETECTED
SARS-COV-2- CYCLE NUMBER: NORMAL

## 2021-11-04 ENCOUNTER — LAB VISIT (OUTPATIENT)
Dept: LAB | Facility: OTHER | Age: 81
End: 2021-11-04
Payer: MEDICARE

## 2021-11-04 DIAGNOSIS — Z20.822 ENCOUNTER FOR LABORATORY TESTING FOR COVID-19 VIRUS: ICD-10-CM

## 2021-11-04 PROCEDURE — U0003 INFECTIOUS AGENT DETECTION BY NUCLEIC ACID (DNA OR RNA); SEVERE ACUTE RESPIRATORY SYNDROME CORONAVIRUS 2 (SARS-COV-2) (CORONAVIRUS DISEASE [COVID-19]), AMPLIFIED PROBE TECHNIQUE, MAKING USE OF HIGH THROUGHPUT TECHNOLOGIES AS DESCRIBED BY CMS-2020-01-R: HCPCS | Mod: HCNC | Performed by: NURSE PRACTITIONER

## 2021-11-05 LAB
SARS-COV-2 RNA RESP QL NAA+PROBE: NOT DETECTED
SARS-COV-2- CYCLE NUMBER: NORMAL

## 2021-11-11 ENCOUNTER — LAB VISIT (OUTPATIENT)
Dept: LAB | Facility: OTHER | Age: 81
End: 2021-11-11
Payer: MEDICARE

## 2021-11-11 DIAGNOSIS — Z20.822 ENCOUNTER FOR LABORATORY TESTING FOR COVID-19 VIRUS: ICD-10-CM

## 2021-11-11 PROCEDURE — U0003 INFECTIOUS AGENT DETECTION BY NUCLEIC ACID (DNA OR RNA); SEVERE ACUTE RESPIRATORY SYNDROME CORONAVIRUS 2 (SARS-COV-2) (CORONAVIRUS DISEASE [COVID-19]), AMPLIFIED PROBE TECHNIQUE, MAKING USE OF HIGH THROUGHPUT TECHNOLOGIES AS DESCRIBED BY CMS-2020-01-R: HCPCS | Mod: HCNC | Performed by: NURSE PRACTITIONER

## 2021-11-12 LAB
SARS-COV-2 RNA RESP QL NAA+PROBE: NOT DETECTED
SARS-COV-2- CYCLE NUMBER: NORMAL

## 2021-11-18 ENCOUNTER — LAB VISIT (OUTPATIENT)
Dept: LAB | Facility: OTHER | Age: 81
End: 2021-11-18
Payer: MEDICARE

## 2021-11-18 DIAGNOSIS — Z20.822 ENCOUNTER FOR LABORATORY TESTING FOR COVID-19 VIRUS: ICD-10-CM

## 2021-11-18 PROCEDURE — U0003 INFECTIOUS AGENT DETECTION BY NUCLEIC ACID (DNA OR RNA); SEVERE ACUTE RESPIRATORY SYNDROME CORONAVIRUS 2 (SARS-COV-2) (CORONAVIRUS DISEASE [COVID-19]), AMPLIFIED PROBE TECHNIQUE, MAKING USE OF HIGH THROUGHPUT TECHNOLOGIES AS DESCRIBED BY CMS-2020-01-R: HCPCS | Mod: HCNC | Performed by: NURSE PRACTITIONER

## 2021-11-19 LAB
SARS-COV-2 RNA RESP QL NAA+PROBE: NOT DETECTED
SARS-COV-2- CYCLE NUMBER: NORMAL

## 2021-11-29 ENCOUNTER — OFFICE VISIT (OUTPATIENT)
Dept: PSYCHIATRY | Facility: CLINIC | Age: 81
End: 2021-11-29
Payer: MEDICARE

## 2021-11-29 DIAGNOSIS — F20.89 OTHER SCHIZOPHRENIA: Primary | ICD-10-CM

## 2021-11-29 DIAGNOSIS — F41.9 ANXIETY: ICD-10-CM

## 2021-11-29 DIAGNOSIS — F70 MILD INTELLECTUAL DISABILITY: ICD-10-CM

## 2021-11-29 DIAGNOSIS — F39 MOOD DISORDER: ICD-10-CM

## 2021-11-29 PROCEDURE — 99499 UNLISTED E&M SERVICE: CPT | Mod: 95,,, | Performed by: INTERNAL MEDICINE

## 2021-11-29 PROCEDURE — 99214 OFFICE O/P EST MOD 30 MIN: CPT | Mod: HCNC,95,, | Performed by: INTERNAL MEDICINE

## 2021-11-29 PROCEDURE — 99499 RISK ADDL DX/OHS AUDIT: ICD-10-PCS | Mod: 95,,, | Performed by: INTERNAL MEDICINE

## 2021-11-29 PROCEDURE — 99214 PR OFFICE/OUTPT VISIT, EST, LEVL IV, 30-39 MIN: ICD-10-PCS | Mod: HCNC,95,, | Performed by: INTERNAL MEDICINE

## 2021-12-02 ENCOUNTER — LAB VISIT (OUTPATIENT)
Dept: LAB | Facility: OTHER | Age: 81
End: 2021-12-02
Payer: MEDICARE

## 2021-12-02 DIAGNOSIS — Z20.822 ENCOUNTER FOR LABORATORY TESTING FOR COVID-19 VIRUS: ICD-10-CM

## 2021-12-02 PROCEDURE — U0003 INFECTIOUS AGENT DETECTION BY NUCLEIC ACID (DNA OR RNA); SEVERE ACUTE RESPIRATORY SYNDROME CORONAVIRUS 2 (SARS-COV-2) (CORONAVIRUS DISEASE [COVID-19]), AMPLIFIED PROBE TECHNIQUE, MAKING USE OF HIGH THROUGHPUT TECHNOLOGIES AS DESCRIBED BY CMS-2020-01-R: HCPCS | Mod: HCNC | Performed by: NURSE PRACTITIONER

## 2021-12-03 LAB
SARS-COV-2 RNA RESP QL NAA+PROBE: NOT DETECTED
SARS-COV-2- CYCLE NUMBER: NORMAL

## 2021-12-09 ENCOUNTER — LAB VISIT (OUTPATIENT)
Dept: LAB | Facility: OTHER | Age: 81
End: 2021-12-09
Payer: MEDICARE

## 2021-12-09 DIAGNOSIS — Z20.822 ENCOUNTER FOR LABORATORY TESTING FOR COVID-19 VIRUS: ICD-10-CM

## 2021-12-09 LAB
SARS-COV-2 RNA RESP QL NAA+PROBE: NOT DETECTED
SARS-COV-2- CYCLE NUMBER: NORMAL

## 2021-12-09 PROCEDURE — U0003 INFECTIOUS AGENT DETECTION BY NUCLEIC ACID (DNA OR RNA); SEVERE ACUTE RESPIRATORY SYNDROME CORONAVIRUS 2 (SARS-COV-2) (CORONAVIRUS DISEASE [COVID-19]), AMPLIFIED PROBE TECHNIQUE, MAKING USE OF HIGH THROUGHPUT TECHNOLOGIES AS DESCRIBED BY CMS-2020-01-R: HCPCS | Mod: HCNC | Performed by: NURSE PRACTITIONER

## 2021-12-16 ENCOUNTER — LAB VISIT (OUTPATIENT)
Dept: LAB | Facility: OTHER | Age: 81
End: 2021-12-16
Payer: MEDICARE

## 2021-12-16 DIAGNOSIS — Z20.822 ENCOUNTER FOR LABORATORY TESTING FOR COVID-19 VIRUS: ICD-10-CM

## 2021-12-16 PROCEDURE — U0003 INFECTIOUS AGENT DETECTION BY NUCLEIC ACID (DNA OR RNA); SEVERE ACUTE RESPIRATORY SYNDROME CORONAVIRUS 2 (SARS-COV-2) (CORONAVIRUS DISEASE [COVID-19]), AMPLIFIED PROBE TECHNIQUE, MAKING USE OF HIGH THROUGHPUT TECHNOLOGIES AS DESCRIBED BY CMS-2020-01-R: HCPCS | Mod: HCNC | Performed by: NURSE PRACTITIONER

## 2021-12-17 LAB
SARS-COV-2 RNA RESP QL NAA+PROBE: NOT DETECTED
SARS-COV-2- CYCLE NUMBER: NORMAL

## 2021-12-23 ENCOUNTER — OFFICE VISIT (OUTPATIENT)
Dept: HEMATOLOGY/ONCOLOGY | Facility: CLINIC | Age: 81
End: 2021-12-23
Payer: MEDICARE

## 2021-12-23 ENCOUNTER — LAB VISIT (OUTPATIENT)
Dept: LAB | Facility: OTHER | Age: 81
End: 2021-12-23
Payer: MEDICARE

## 2021-12-23 VITALS
HEART RATE: 83 BPM | DIASTOLIC BLOOD PRESSURE: 63 MMHG | RESPIRATION RATE: 18 BRPM | SYSTOLIC BLOOD PRESSURE: 131 MMHG | BODY MASS INDEX: 28.12 KG/M2 | OXYGEN SATURATION: 99 % | HEIGHT: 60 IN

## 2021-12-23 DIAGNOSIS — Z20.822 ENCOUNTER FOR LABORATORY TESTING FOR COVID-19 VIRUS: ICD-10-CM

## 2021-12-23 DIAGNOSIS — C50.912 INFILTRATING DUCTAL CARCINOMA OF LEFT BREAST: Primary | ICD-10-CM

## 2021-12-23 LAB
SARS-COV-2 RNA RESP QL NAA+PROBE: NOT DETECTED
SARS-COV-2- CYCLE NUMBER: NORMAL

## 2021-12-23 PROCEDURE — 99999 PR PBB SHADOW E&M-EST. PATIENT-LVL III: CPT | Mod: PBBFAC,HCNC,GC, | Performed by: STUDENT IN AN ORGANIZED HEALTH CARE EDUCATION/TRAINING PROGRAM

## 2021-12-23 PROCEDURE — 99214 PR OFFICE/OUTPT VISIT, EST, LEVL IV, 30-39 MIN: ICD-10-PCS | Mod: HCNC,GC,S$GLB, | Performed by: STUDENT IN AN ORGANIZED HEALTH CARE EDUCATION/TRAINING PROGRAM

## 2021-12-23 PROCEDURE — 3078F PR MOST RECENT DIASTOLIC BLOOD PRESSURE < 80 MM HG: ICD-10-PCS | Mod: HCNC,CPTII,GC,S$GLB | Performed by: STUDENT IN AN ORGANIZED HEALTH CARE EDUCATION/TRAINING PROGRAM

## 2021-12-23 PROCEDURE — 1159F MED LIST DOCD IN RCRD: CPT | Mod: HCNC,CPTII,GC,S$GLB | Performed by: STUDENT IN AN ORGANIZED HEALTH CARE EDUCATION/TRAINING PROGRAM

## 2021-12-23 PROCEDURE — 3075F SYST BP GE 130 - 139MM HG: CPT | Mod: HCNC,CPTII,GC,S$GLB | Performed by: STUDENT IN AN ORGANIZED HEALTH CARE EDUCATION/TRAINING PROGRAM

## 2021-12-23 PROCEDURE — 1160F RVW MEDS BY RX/DR IN RCRD: CPT | Mod: HCNC,CPTII,GC,S$GLB | Performed by: STUDENT IN AN ORGANIZED HEALTH CARE EDUCATION/TRAINING PROGRAM

## 2021-12-23 PROCEDURE — 99214 OFFICE O/P EST MOD 30 MIN: CPT | Mod: HCNC,GC,S$GLB, | Performed by: STUDENT IN AN ORGANIZED HEALTH CARE EDUCATION/TRAINING PROGRAM

## 2021-12-23 PROCEDURE — 3075F PR MOST RECENT SYSTOLIC BLOOD PRESS GE 130-139MM HG: ICD-10-PCS | Mod: HCNC,CPTII,GC,S$GLB | Performed by: STUDENT IN AN ORGANIZED HEALTH CARE EDUCATION/TRAINING PROGRAM

## 2021-12-23 PROCEDURE — 1159F PR MEDICATION LIST DOCUMENTED IN MEDICAL RECORD: ICD-10-PCS | Mod: HCNC,CPTII,GC,S$GLB | Performed by: STUDENT IN AN ORGANIZED HEALTH CARE EDUCATION/TRAINING PROGRAM

## 2021-12-23 PROCEDURE — 99999 PR PBB SHADOW E&M-EST. PATIENT-LVL III: ICD-10-PCS | Mod: PBBFAC,HCNC,GC, | Performed by: STUDENT IN AN ORGANIZED HEALTH CARE EDUCATION/TRAINING PROGRAM

## 2021-12-23 PROCEDURE — 3078F DIAST BP <80 MM HG: CPT | Mod: HCNC,CPTII,GC,S$GLB | Performed by: STUDENT IN AN ORGANIZED HEALTH CARE EDUCATION/TRAINING PROGRAM

## 2021-12-23 PROCEDURE — U0003 INFECTIOUS AGENT DETECTION BY NUCLEIC ACID (DNA OR RNA); SEVERE ACUTE RESPIRATORY SYNDROME CORONAVIRUS 2 (SARS-COV-2) (CORONAVIRUS DISEASE [COVID-19]), AMPLIFIED PROBE TECHNIQUE, MAKING USE OF HIGH THROUGHPUT TECHNOLOGIES AS DESCRIBED BY CMS-2020-01-R: HCPCS | Mod: HCNC | Performed by: NURSE PRACTITIONER

## 2021-12-23 PROCEDURE — 1160F PR REVIEW ALL MEDS BY PRESCRIBER/CLIN PHARMACIST DOCUMENTED: ICD-10-PCS | Mod: HCNC,CPTII,GC,S$GLB | Performed by: STUDENT IN AN ORGANIZED HEALTH CARE EDUCATION/TRAINING PROGRAM

## 2021-12-30 ENCOUNTER — LAB VISIT (OUTPATIENT)
Dept: LAB | Facility: OTHER | Age: 81
End: 2021-12-30
Payer: MEDICARE

## 2021-12-30 DIAGNOSIS — Z20.822 ENCOUNTER FOR LABORATORY TESTING FOR COVID-19 VIRUS: ICD-10-CM

## 2021-12-30 PROCEDURE — U0003 INFECTIOUS AGENT DETECTION BY NUCLEIC ACID (DNA OR RNA); SEVERE ACUTE RESPIRATORY SYNDROME CORONAVIRUS 2 (SARS-COV-2) (CORONAVIRUS DISEASE [COVID-19]), AMPLIFIED PROBE TECHNIQUE, MAKING USE OF HIGH THROUGHPUT TECHNOLOGIES AS DESCRIBED BY CMS-2020-01-R: HCPCS | Mod: ST72,HCNC | Performed by: NURSE PRACTITIONER

## 2022-01-04 LAB
SARS-COV-2 RNA RESP QL NAA+PROBE: NOT DETECTED
SARS-COV-2- CYCLE NUMBER: NORMAL

## 2022-01-06 ENCOUNTER — LAB VISIT (OUTPATIENT)
Dept: LAB | Facility: OTHER | Age: 82
End: 2022-01-06
Payer: MEDICAID

## 2022-01-06 DIAGNOSIS — Z20.822 ENCOUNTER FOR LABORATORY TESTING FOR COVID-19 VIRUS: ICD-10-CM

## 2022-01-06 PROCEDURE — U0003 INFECTIOUS AGENT DETECTION BY NUCLEIC ACID (DNA OR RNA); SEVERE ACUTE RESPIRATORY SYNDROME CORONAVIRUS 2 (SARS-COV-2) (CORONAVIRUS DISEASE [COVID-19]), AMPLIFIED PROBE TECHNIQUE, MAKING USE OF HIGH THROUGHPUT TECHNOLOGIES AS DESCRIBED BY CMS-2020-01-R: HCPCS | Mod: ST72,HCNC | Performed by: NURSE PRACTITIONER

## 2022-01-10 LAB — SARS-COV-2 RNA RESP QL NAA+PROBE: NOT DETECTED

## 2022-01-13 ENCOUNTER — LAB VISIT (OUTPATIENT)
Dept: LAB | Facility: OTHER | Age: 82
End: 2022-01-13
Payer: MEDICARE

## 2022-01-13 DIAGNOSIS — Z20.822 ENCOUNTER FOR LABORATORY TESTING FOR COVID-19 VIRUS: ICD-10-CM

## 2022-01-13 PROCEDURE — U0003 INFECTIOUS AGENT DETECTION BY NUCLEIC ACID (DNA OR RNA); SEVERE ACUTE RESPIRATORY SYNDROME CORONAVIRUS 2 (SARS-COV-2) (CORONAVIRUS DISEASE [COVID-19]), AMPLIFIED PROBE TECHNIQUE, MAKING USE OF HIGH THROUGHPUT TECHNOLOGIES AS DESCRIBED BY CMS-2020-01-R: HCPCS | Mod: HCNC | Performed by: NURSE PRACTITIONER

## 2022-01-14 LAB
SARS-COV-2 RNA RESP QL NAA+PROBE: NOT DETECTED
SARS-COV-2- CYCLE NUMBER: NORMAL

## 2022-01-20 ENCOUNTER — LAB VISIT (OUTPATIENT)
Dept: LAB | Facility: OTHER | Age: 82
End: 2022-01-20
Payer: MEDICAID

## 2022-01-20 DIAGNOSIS — Z20.822 ENCOUNTER FOR LABORATORY TESTING FOR COVID-19 VIRUS: ICD-10-CM

## 2022-01-20 LAB
SARS-COV-2 RNA RESP QL NAA+PROBE: NOT DETECTED
SARS-COV-2- CYCLE NUMBER: NORMAL

## 2022-01-20 PROCEDURE — U0003 INFECTIOUS AGENT DETECTION BY NUCLEIC ACID (DNA OR RNA); SEVERE ACUTE RESPIRATORY SYNDROME CORONAVIRUS 2 (SARS-COV-2) (CORONAVIRUS DISEASE [COVID-19]), AMPLIFIED PROBE TECHNIQUE, MAKING USE OF HIGH THROUGHPUT TECHNOLOGIES AS DESCRIBED BY CMS-2020-01-R: HCPCS | Mod: HCNC | Performed by: EMERGENCY MEDICINE

## 2022-01-27 ENCOUNTER — LAB VISIT (OUTPATIENT)
Dept: LAB | Facility: OTHER | Age: 82
End: 2022-01-27
Payer: MEDICAID

## 2022-01-27 DIAGNOSIS — Z20.822 ENCOUNTER FOR LABORATORY TESTING FOR COVID-19 VIRUS: ICD-10-CM

## 2022-01-27 LAB
SARS-COV-2 RNA RESP QL NAA+PROBE: NOT DETECTED
SARS-COV-2- CYCLE NUMBER: NORMAL

## 2022-01-27 PROCEDURE — U0003 INFECTIOUS AGENT DETECTION BY NUCLEIC ACID (DNA OR RNA); SEVERE ACUTE RESPIRATORY SYNDROME CORONAVIRUS 2 (SARS-COV-2) (CORONAVIRUS DISEASE [COVID-19]), AMPLIFIED PROBE TECHNIQUE, MAKING USE OF HIGH THROUGHPUT TECHNOLOGIES AS DESCRIBED BY CMS-2020-01-R: HCPCS | Mod: HCNC | Performed by: EMERGENCY MEDICINE

## 2022-02-03 ENCOUNTER — LAB VISIT (OUTPATIENT)
Dept: LAB | Facility: OTHER | Age: 82
End: 2022-02-03
Payer: MEDICARE

## 2022-02-03 DIAGNOSIS — Z20.822 ENCOUNTER FOR LABORATORY TESTING FOR COVID-19 VIRUS: ICD-10-CM

## 2022-02-03 PROCEDURE — U0003 INFECTIOUS AGENT DETECTION BY NUCLEIC ACID (DNA OR RNA); SEVERE ACUTE RESPIRATORY SYNDROME CORONAVIRUS 2 (SARS-COV-2) (CORONAVIRUS DISEASE [COVID-19]), AMPLIFIED PROBE TECHNIQUE, MAKING USE OF HIGH THROUGHPUT TECHNOLOGIES AS DESCRIBED BY CMS-2020-01-R: HCPCS | Mod: HCNC | Performed by: EMERGENCY MEDICINE

## 2022-02-04 LAB
SARS-COV-2 RNA RESP QL NAA+PROBE: NOT DETECTED
SARS-COV-2- CYCLE NUMBER: NORMAL

## 2022-02-07 ENCOUNTER — LAB VISIT (OUTPATIENT)
Dept: LAB | Facility: OTHER | Age: 82
End: 2022-02-07
Payer: MEDICARE

## 2022-02-07 DIAGNOSIS — Z20.822 ENCOUNTER FOR LABORATORY TESTING FOR COVID-19 VIRUS: ICD-10-CM

## 2022-02-07 PROCEDURE — U0003 INFECTIOUS AGENT DETECTION BY NUCLEIC ACID (DNA OR RNA); SEVERE ACUTE RESPIRATORY SYNDROME CORONAVIRUS 2 (SARS-COV-2) (CORONAVIRUS DISEASE [COVID-19]), AMPLIFIED PROBE TECHNIQUE, MAKING USE OF HIGH THROUGHPUT TECHNOLOGIES AS DESCRIBED BY CMS-2020-01-R: HCPCS | Mod: HCNC | Performed by: EMERGENCY MEDICINE

## 2022-02-08 LAB
SARS-COV-2 RNA RESP QL NAA+PROBE: NOT DETECTED
SARS-COV-2- CYCLE NUMBER: NORMAL

## 2022-02-14 ENCOUNTER — LAB VISIT (OUTPATIENT)
Dept: LAB | Facility: OTHER | Age: 82
End: 2022-02-14
Payer: MEDICARE

## 2022-02-14 DIAGNOSIS — Z20.822 ENCOUNTER FOR LABORATORY TESTING FOR COVID-19 VIRUS: ICD-10-CM

## 2022-02-14 PROCEDURE — U0003 INFECTIOUS AGENT DETECTION BY NUCLEIC ACID (DNA OR RNA); SEVERE ACUTE RESPIRATORY SYNDROME CORONAVIRUS 2 (SARS-COV-2) (CORONAVIRUS DISEASE [COVID-19]), AMPLIFIED PROBE TECHNIQUE, MAKING USE OF HIGH THROUGHPUT TECHNOLOGIES AS DESCRIBED BY CMS-2020-01-R: HCPCS | Mod: HCNC | Performed by: EMERGENCY MEDICINE

## 2022-02-15 LAB
SARS-COV-2 RNA RESP QL NAA+PROBE: NOT DETECTED
SARS-COV-2- CYCLE NUMBER: NORMAL

## 2022-02-21 ENCOUNTER — LAB VISIT (OUTPATIENT)
Dept: LAB | Facility: OTHER | Age: 82
End: 2022-02-21
Payer: MEDICARE

## 2022-02-21 DIAGNOSIS — Z20.822 ENCOUNTER FOR LABORATORY TESTING FOR COVID-19 VIRUS: ICD-10-CM

## 2022-02-21 PROCEDURE — U0003 INFECTIOUS AGENT DETECTION BY NUCLEIC ACID (DNA OR RNA); SEVERE ACUTE RESPIRATORY SYNDROME CORONAVIRUS 2 (SARS-COV-2) (CORONAVIRUS DISEASE [COVID-19]), AMPLIFIED PROBE TECHNIQUE, MAKING USE OF HIGH THROUGHPUT TECHNOLOGIES AS DESCRIBED BY CMS-2020-01-R: HCPCS | Mod: HCNC | Performed by: EMERGENCY MEDICINE

## 2022-02-22 ENCOUNTER — OFFICE VISIT (OUTPATIENT)
Dept: PSYCHIATRY | Facility: CLINIC | Age: 82
End: 2022-02-22
Payer: MEDICARE

## 2022-02-22 DIAGNOSIS — F20.89 OTHER SCHIZOPHRENIA: Primary | ICD-10-CM

## 2022-02-22 DIAGNOSIS — F70 MILD INTELLECTUAL DISABILITY: ICD-10-CM

## 2022-02-22 DIAGNOSIS — F39 MOOD DISORDER: ICD-10-CM

## 2022-02-22 DIAGNOSIS — F41.9 ANXIETY: ICD-10-CM

## 2022-02-22 LAB
SARS-COV-2 RNA RESP QL NAA+PROBE: NOT DETECTED
SARS-COV-2- CYCLE NUMBER: NORMAL

## 2022-02-22 PROCEDURE — 99214 OFFICE O/P EST MOD 30 MIN: CPT | Mod: HCNC,95,, | Performed by: INTERNAL MEDICINE

## 2022-02-22 PROCEDURE — 1159F MED LIST DOCD IN RCRD: CPT | Mod: HCNC,CPTII,95, | Performed by: INTERNAL MEDICINE

## 2022-02-22 PROCEDURE — 99214 PR OFFICE/OUTPT VISIT, EST, LEVL IV, 30-39 MIN: ICD-10-PCS | Mod: HCNC,95,, | Performed by: INTERNAL MEDICINE

## 2022-02-22 PROCEDURE — 1160F PR REVIEW ALL MEDS BY PRESCRIBER/CLIN PHARMACIST DOCUMENTED: ICD-10-PCS | Mod: HCNC,CPTII,95, | Performed by: INTERNAL MEDICINE

## 2022-02-22 PROCEDURE — 99499 UNLISTED E&M SERVICE: CPT | Mod: 95,,, | Performed by: INTERNAL MEDICINE

## 2022-02-22 PROCEDURE — 99499 RISK ADDL DX/OHS AUDIT: ICD-10-PCS | Mod: 95,,, | Performed by: INTERNAL MEDICINE

## 2022-02-22 PROCEDURE — 1160F RVW MEDS BY RX/DR IN RCRD: CPT | Mod: HCNC,CPTII,95, | Performed by: INTERNAL MEDICINE

## 2022-02-22 PROCEDURE — 1159F PR MEDICATION LIST DOCUMENTED IN MEDICAL RECORD: ICD-10-PCS | Mod: HCNC,CPTII,95, | Performed by: INTERNAL MEDICINE

## 2022-02-22 NOTE — PROGRESS NOTES
OUTPATIENT PSYCHIATRY RETURN VISIT    ENCOUNTER DATE:  2/25/2022  SITE:  [facility]  LENGTH OF SESSION:  10 minutes    The patient location is:  [place]  The chief complaint leading to consultation is:  Follow up    Visit type:  audiovisual    Face to Face time with patient:  10 minutes  15 minutes of total time spent on the encounter, which includes face to face time and non-face to face time preparing to see the patient (eg, review of tests), Obtaining and/or reviewing separately obtained history, Documenting clinical information in the electronic or other health record, Independently interpreting results (not separately reported) and communicating results to the patient/family/caregiver, or Care coordination (not separately reported).     Each patient to whom he or she provides medical services by telemedicine is:  (1) informed of the relationship between the physician and patient and the respective role of any other health care provider with respect to management of the patient; and (2) notified that he or she may decline to receive medical services by telemedicine and may withdraw from such care at any time.    CHIEF COMPLAINT:  Follow-up      HISTORY OF PRESENTING ILLNESS:  Nettie Villasenor is a 81 y.o. female with history of Mood disorder (Schizophrenia per chart?), Anxiety, and Mild intellectual disability who presents for follow up appointment.  She presents today with her , Patit, who provides most of the history.    Plan at last appointment on 11/29/2021:  · Symptoms well controlled other than situational stress.  · Continue Prozac 20mg daily and Abilify 2mg qHS.  · Discussed with patient and  informed consent, risks versus benefits, alternative treatments, side effect profile and the inherent unpredictability of individual responses to these treatments.  The patient and  understanding of the above and agree with this current  plan.    History as told by patient:  Says she has been feeling fine.  Julia Sherita they had a party with Verena Meza and gifts.  Got to be with family for reunion - had such a good time.  Overall says she is good.  Denies depression or anxiety.  Sleeping well.  No behavioral issues per .  Denies AVH's or delusions.    Medication side effects:  No  Medication compliance:  Yes    PSYCHIATRIC REVIEW OF SYSTEMS:  Trouble with sleep:  Denies  Appetite changes:  Denies  Weight changes:  Denies  Lack of energy:  Denies  Anhedonia:  Denies  Somatic symptoms:  Denies  Libido:  Not discussed  Anxiety/panic:  Denies  Guilty/hopeless:  Denies  Self-injurious behavior/risky behavior:  Denies  Any drugs:  Denies  Alcohol:  Denies    MEDICAL REVIEW OF SYSTEMS:  Complete review of systems performed covering Constitutional, Musculoskeletal, Neurologic.  All systems negative except for that covered in HPI.    PAST PSYCHIATRIC, MEDICAL, AND SOCIAL HISTORY REVIEWED  The patient's past medical, family and social history have been reviewed and updated as appropriate within the electronic medical record - see encounter notes.    MEDICATIONS:    Current Outpatient Medications:     acetaminophen (TYLENOL) 500 MG tablet, Take 1,000 mg by mouth every evening., Disp: , Rfl:     alendronate (FOSAMAX) 70 MG tablet, TAKE 1 TABLET BY MOUTH EVERY 7 DAYS, Disp: 4 tablet, Rfl: 3    anastrozole (ARIMIDEX) 1 mg Tab, Take 1 tablet (1 mg total) by mouth once daily., Disp: 90 tablet, Rfl: 3    ARIPiprazole (ABILIFY) 2 MG Tab, TAKE 1 TABLET BY MOUTH EVERY EVENING at bedtime, Disp: 30 tablet, Rfl: 11    aspirin (ECOTRIN) 81 MG EC tablet, TAKE 1 TABLET BY MOUTH daily TO PREVENT CLOTS, Disp: 30 tablet, Rfl: 11    aspirin 81 MG Chew, Take 81 mg by mouth once daily., Disp: , Rfl:     calcium carbonate (OS-OTTONIEL) 500 mg calcium (1,250 mg) tablet, Take 1 tablet by mouth 2 (two) times daily., Disp: , Rfl:     COVID-19 vacc,mRNA,Moderna,/PF  "(MODERNA COVID-19 VACCINE, EUA, IM), ADMINISTER 0.5ML IN THE MUSCLE AS DIRECTED, Disp: , Rfl:     docusate sodium (COLACE) 100 MG capsule, Take 100 mg by mouth once daily., Disp: , Rfl:     fexofenadine (ALLEGRA) 180 MG tablet, Take 180 mg by mouth daily as needed., Disp: , Rfl:     FLUoxetine 20 MG capsule, TAKE 1 CAPSULE BY MOUTH every morning, Disp: 30 capsule, Rfl: 11    HYDROcodone-acetaminophen (NORCO) 5-325 mg per tablet, Take 1 tablet by mouth every 6 (six) hours as needed for Pain. (Patient not taking: Reported on 12/23/2021), Disp: 21 tablet, Rfl: 0    lorazepam (ATIVAN) 0.5 MG tablet, Take 0.5 mg by mouth every 12 (twelve) hours as needed., Disp: , Rfl:     multivitamin capsule, Take 1 capsule by mouth once daily., Disp: , Rfl:     vitamin D 1000 units Tab, Take 185 mg by mouth once daily., Disp: , Rfl:   No current facility-administered medications for this visit.    Facility-Administered Medications Ordered in Other Visits:     0.9%  NaCl infusion, , Intravenous, Continuous, John Aguilera MD, Stopped at 09/04/20 1345    fentaNYL injection 25 mcg, 25 mcg, Intravenous, Q5 Min PRN, Mk Forrester MD, 50 mcg at 09/04/20 1015    midazolam (VERSED) 1 mg/mL injection 0.5 mg, 0.5 mg, Intravenous, PRN, Mk Forrester MD    ALLERGIES:  Review of patient's allergies indicates:  No Known Allergies    PSYCHIATRIC EXAM:  There were no vitals filed for this visit.  Appearance:  Well groomed, appearing healthy and of stated age  Behavior:  Cooperative, pleasant, no psychomotor agitation or retardation  Speech:  Normal rate, rhythm, prosody, and volume  Mood:  "Good"  Affect:  Congruent  Thought Process:  Slightly tangential at times, logical, goal directed  Thought Content:  Negative for suicidal ideation, homicidal ideation, delusions or hallucinations.  Associations:  Intact  Memory:  Grossly Intact  Level of Consciousness/Orientation:  Grossly intact  Fund of Knowledge:  " Below average  Attention:  Good  Language:  Fluent, able to name abstract and concrete objects  Insight:  Fair  Judgment:  Intact  Psychomotor signs:  No abnormal movements of face  Gait:  Unable to assess via virtual visit      RELEVANT LABS/STUDIES:    Lab Results   Component Value Date    WBC 4.53 09/02/2020    HGB 10.0 (L) 09/02/2020    HCT 32.3 (L) 09/02/2020     (H) 09/02/2020     09/02/2020     BMP  Lab Results   Component Value Date     09/02/2020    K 4.2 09/02/2020     09/02/2020    CO2 25 09/02/2020    BUN 34 (H) 09/02/2020    CREATININE 0.9 09/02/2020    CALCIUM 9.7 09/02/2020    ANIONGAP 9 09/02/2020    ESTGFRAFRICA >60.0 09/02/2020    EGFRNONAA >60.0 09/02/2020     Lab Results   Component Value Date    ALT 15 09/02/2020    AST 17 09/02/2020    ALKPHOS 56 09/02/2020    BILITOT 0.2 09/02/2020     Lab Results   Component Value Date    TSH 0.394 (L) 07/02/2020     No results found for: LABA1C, HGBA1C    IMPRESSION:    Nettie Villasenor is a 81 y.o. female with history of Mood disorder (Schizophrenia per chart?), Anxiety, and Mild intellectual disability who presents for follow up appointment.    Status/Progress:  Based on the examination today, the patient's problem(s) is/are stable.  New problems have not been presented today.     Risk Parameters:  Patient reports no suicidal ideation  Patient reports no homicidal ideation  Patient reports no self-injurious behavior  Patient reports no violent behavior    DIAGNOSES:    ICD-10-CM ICD-9-CM   1. Other schizophrenia  F20.89 295.80   2. Mood disorder  F39 296.90   3. Anxiety  F41.9 300.00   4. Mild intellectual disability  F70 317       PLAN:  · Symptoms well controlled at this time.  · Continue Prozac 20mg daily and Abilify 2mg qHS.  · Discussed with patient and  informed consent, risks versus benefits, alternative treatments, side effect profile and the inherent unpredictability of individual responses to these treatments.   The patient and  understanding of the above and agree with this current plan.    RETURN TO CLINIC:  No follow-ups on file.

## 2022-02-28 ENCOUNTER — LAB VISIT (OUTPATIENT)
Dept: LAB | Facility: OTHER | Age: 82
End: 2022-02-28
Payer: MEDICARE

## 2022-02-28 DIAGNOSIS — Z20.822 ENCOUNTER FOR LABORATORY TESTING FOR COVID-19 VIRUS: ICD-10-CM

## 2022-02-28 PROCEDURE — U0003 INFECTIOUS AGENT DETECTION BY NUCLEIC ACID (DNA OR RNA); SEVERE ACUTE RESPIRATORY SYNDROME CORONAVIRUS 2 (SARS-COV-2) (CORONAVIRUS DISEASE [COVID-19]), AMPLIFIED PROBE TECHNIQUE, MAKING USE OF HIGH THROUGHPUT TECHNOLOGIES AS DESCRIBED BY CMS-2020-01-R: HCPCS | Mod: HCNC | Performed by: EMERGENCY MEDICINE

## 2022-03-01 LAB
SARS-COV-2 RNA RESP QL NAA+PROBE: NOT DETECTED
SARS-COV-2- CYCLE NUMBER: NORMAL

## 2022-03-07 ENCOUNTER — LAB VISIT (OUTPATIENT)
Dept: LAB | Facility: OTHER | Age: 82
End: 2022-03-07
Payer: MEDICARE

## 2022-03-07 DIAGNOSIS — Z20.822 ENCOUNTER FOR LABORATORY TESTING FOR COVID-19 VIRUS: ICD-10-CM

## 2022-03-07 PROCEDURE — U0003 INFECTIOUS AGENT DETECTION BY NUCLEIC ACID (DNA OR RNA); SEVERE ACUTE RESPIRATORY SYNDROME CORONAVIRUS 2 (SARS-COV-2) (CORONAVIRUS DISEASE [COVID-19]), AMPLIFIED PROBE TECHNIQUE, MAKING USE OF HIGH THROUGHPUT TECHNOLOGIES AS DESCRIBED BY CMS-2020-01-R: HCPCS | Mod: HCNC | Performed by: EMERGENCY MEDICINE

## 2022-03-08 LAB
SARS-COV-2 RNA RESP QL NAA+PROBE: NOT DETECTED
SARS-COV-2- CYCLE NUMBER: NORMAL

## 2022-03-14 ENCOUNTER — LAB VISIT (OUTPATIENT)
Dept: LAB | Facility: OTHER | Age: 82
End: 2022-03-14
Payer: MEDICARE

## 2022-03-14 DIAGNOSIS — Z20.822 ENCOUNTER FOR LABORATORY TESTING FOR COVID-19 VIRUS: ICD-10-CM

## 2022-03-14 PROCEDURE — U0003 INFECTIOUS AGENT DETECTION BY NUCLEIC ACID (DNA OR RNA); SEVERE ACUTE RESPIRATORY SYNDROME CORONAVIRUS 2 (SARS-COV-2) (CORONAVIRUS DISEASE [COVID-19]), AMPLIFIED PROBE TECHNIQUE, MAKING USE OF HIGH THROUGHPUT TECHNOLOGIES AS DESCRIBED BY CMS-2020-01-R: HCPCS | Performed by: EMERGENCY MEDICINE

## 2022-03-15 LAB
SARS-COV-2 RNA RESP QL NAA+PROBE: NOT DETECTED
SARS-COV-2- CYCLE NUMBER: NORMAL

## 2022-03-21 ENCOUNTER — LAB VISIT (OUTPATIENT)
Dept: LAB | Facility: OTHER | Age: 82
End: 2022-03-21
Payer: MEDICARE

## 2022-03-21 DIAGNOSIS — Z20.822 ENCOUNTER FOR LABORATORY TESTING FOR COVID-19 VIRUS: ICD-10-CM

## 2022-03-21 PROCEDURE — U0003 INFECTIOUS AGENT DETECTION BY NUCLEIC ACID (DNA OR RNA); SEVERE ACUTE RESPIRATORY SYNDROME CORONAVIRUS 2 (SARS-COV-2) (CORONAVIRUS DISEASE [COVID-19]), AMPLIFIED PROBE TECHNIQUE, MAKING USE OF HIGH THROUGHPUT TECHNOLOGIES AS DESCRIBED BY CMS-2020-01-R: HCPCS | Performed by: EMERGENCY MEDICINE

## 2022-03-22 LAB
SARS-COV-2 RNA RESP QL NAA+PROBE: NOT DETECTED
SARS-COV-2- CYCLE NUMBER: NORMAL

## 2022-03-23 NOTE — PROGRESS NOTES
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 Ki-67: 5%   09/04/2020: Lumpectomy -  Negative for atypia or residual carcinoma   10/01/2020: Started on Anastrozole adjuvant therapy        History of present illness   History:   Ms. Villasenor is a 81 y.o. female with history as below is referred to oncology clinic for discuss hormonal adjuvant therapy in stage I ER+ HER2- breast cancer.   She lives in [facility]. She had an abnormal screening mammogram on 07/2020 showing Left breast 04:00 o'clock axis mass. US showed In the left breast lower outer quadrant middle depth, irregular equal density mass measuring 0.8 cm, No left axillary adenopathy. 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 Ki-67: 5%   She had a lumpectomy on 09/04/2020 by Dr. Benites, with no sentinel node biopsy. She had uneventful procedure and hospital course. Pathology from lumpectomy showed benign breast tissue showing sequela of previous biopsy with marked fibrosis in a background of atrophic change,  Negative for atypia or residual carcinoma.   She is referred for adjuvant endocrine therapy.   She is started on Anastrozole 10/2020. Tolerating well.     Interval history  Today, she is with her  from the nursing home.   She has no complaints. she denies any pain or itching in the breast area. She has good appetite, regular bowel movements. No muscle cramps. No other reported side effects of anastrozole.    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 Age of menopause was approximately in 40s.  Last menstrual period was around the time of menopause. Patient denies hormonal therapy. Patient is .    Past Surgical HIstory:   Past Surgical History:   Procedure Laterality Date    BREAST LUMPECTOMY      LUMPECTOMY,BREAST,WITH RADIOACTIVE SEED LOCALIZATION Left 2020    Procedure: LUMPECTOMY,BREAST,WITH RADIOACTIVE SEED LOCALIZATION reflector placed on 20;  Surgeon: Los Benites MD;  Location: [facility];  Service: General;  Laterality: Left;    ORIF left lower leg      TONSILLECTOMY         Family History:   Family History   Problem Relation Age of Onset    Dementia Mother     Heart attack Father     Melanoma Neg Hx     Breast cancer Neg Hx     Colon cancer Neg Hx     Ovarian cancer Neg Hx        Social History:  reports that she has never smoked. She has never used smokeless tobacco. She reports that she does not drink alcohol and does not use drugs.    Allergies:  Review of patient's allergies indicates:  No Known Allergies    Medications:  Current Outpatient Medications   Medication Sig Dispense Refill    acetaminophen (TYLENOL) 500 MG tablet Take 1,000 mg by mouth every evening.      alendronate (FOSAMAX) 70 MG tablet TAKE 1 TABLET BY MOUTH EVERY 7 DAYS 4 tablet 3    anastrozole (ARIMIDEX) 1 mg Tab Take 1 tablet (1 mg total) by mouth once daily. 90 tablet 3    ARIPiprazole (ABILIFY) 2 MG Tab TAKE 1 TABLET BY MOUTH EVERY EVENING at bedtime 30 tablet 11    aspirin (ECOTRIN) 81 MG EC tablet TAKE 1 TABLET BY MOUTH daily TO PREVENT CLOTS 30 tablet 11    aspirin 81 MG Chew Take 81 mg by mouth once daily.      calcium carbonate (OS-OTTONIEL) 500 mg calcium (1,250  mg) tablet Take 1 tablet by mouth 2 (two) times daily.      COVID-19 vacc,mRNA,Moderna,/PF (MODERNA COVID-19 VACCINE, EUA, IM) ADMINISTER 0.5ML IN THE MUSCLE AS DIRECTED      docusate sodium (COLACE) 100 MG capsule Take 100 mg by mouth once daily.      fexofenadine (ALLEGRA) 180 MG tablet Take 180 mg by mouth daily as needed.      FLUoxetine 20 MG capsule TAKE 1 CAPSULE BY MOUTH every morning 30 capsule 11    HYDROcodone-acetaminophen (NORCO) 5-325 mg per tablet Take 1 tablet by mouth every 6 (six) hours as needed for Pain. (Patient not taking: Reported on 12/23/2021) 21 tablet 0    lorazepam (ATIVAN) 0.5 MG tablet Take 0.5 mg by mouth every 12 (twelve) hours as needed.      multivitamin capsule Take 1 capsule by mouth once daily.      vitamin D 1000 units Tab Take 185 mg by mouth once daily.       No current facility-administered medications for this visit.     Facility-Administered Medications Ordered in Other Visits   Medication Dose Route Frequency Provider Last Rate Last Admin    0.9%  NaCl infusion   Intravenous Continuous John Aguilera MD   Stopped at 09/04/20 1345    fentaNYL injection 25 mcg  25 mcg Intravenous Q5 Min PRN Mk Forrester MD   50 mcg at 09/04/20 1015    midazolam (VERSED) 1 mg/mL injection 0.5 mg  0.5 mg Intravenous PRN Mk Forrester MD           Review of Systems   Constitutional: Negative for appetite change, fatigue, fever and unexpected weight change.   HENT: Negative for congestion.    Eyes: Negative for pain and visual disturbance.   Respiratory: Negative for cough, chest tightness, shortness of breath and wheezing.    Cardiovascular: Negative for chest pain, palpitations and leg swelling.   Gastrointestinal: Negative for abdominal pain, blood in stool, constipation, diarrhea, nausea and vomiting.   Genitourinary: Negative for difficulty urinating, flank pain, frequency, hematuria and urgency.   Musculoskeletal: Negative for arthralgias, back  pain and myalgias.   Neurological: Negative for dizziness, weakness, numbness and headaches.   Psychiatric/Behavioral: The patient is not nervous/anxious.        ECOG Performance Status: 2   Objective:      Vitals:   Vitals:    03/24/22 1014   BP: 128/62   Pulse: 72   Temp: 98.1 °F (36.7 °C)   TempSrc: Oral       Physical Exam  Constitutional:       General: She is not in acute distress.     Appearance: Normal appearance.   HENT:      Head: Normocephalic and atraumatic.   Eyes:      Pupils: Pupils are equal, round, and reactive to light.   Cardiovascular:      Rate and Rhythm: Normal rate and regular rhythm.      Heart sounds: No murmur heard.  Pulmonary:      Effort: No respiratory distress.      Breath sounds: Normal breath sounds. No wheezing.   Chest:   Breasts:      Right: No swelling, inverted nipple, mass, skin change, tenderness or axillary adenopathy.      Left: No inverted nipple, mass, skin change, tenderness or axillary adenopathy.       Abdominal:      General: Abdomen is flat. Bowel sounds are normal. There is no distension.      Palpations: Abdomen is soft. There is no mass.      Tenderness: There is no abdominal tenderness.   Musculoskeletal:         General: No swelling or deformity.   Lymphadenopathy:      Cervical: No cervical adenopathy.      Upper Body:      Right upper body: No axillary adenopathy.      Left upper body: No axillary adenopathy.   Skin:     Coloration: Skin is not jaundiced.   Neurological:      General: No focal deficit present.      Mental Status: She is alert and oriented to person, place, and time.         Laboratory Data:  Lab Visit on 03/21/2022   Component Date Value Ref Range Status    SARS-CoV2 (COVID-19) Qualitative P* 03/21/2022 Not Detected  Not Detected Final    Comment: This test utilizes a real-time reverse transcription  polymerase chain reaction procedure to amplify and   detect the SARS-CoV-2 RdRp and N genes.    The analytical sensitivity (limit of detection)  of   this assay is 100 copies/mL.     A Detected result is considered positive for COVID-19.  This patient is considered infected with the   SARS-CoV-2 virus and is presumed to be contagious.    A Not Detected result means that SARS-CoV-2 RNA is not  present above the limit of detection. It does not rule  out the possibility of COVID-19 and should not be the  sole basis for treatment decisions.  If COVID-19 is   strongly suspected based on clinical and exposure   history,re-testing should be considered.      This test is only for use under Food and Drug   Administration s Emergency Use Authorization (EUA).   Commercial reagents are provided by [employer].  Performance characteristics of the EUA have been   independently verified by [facility]   Department of Pathology a                           nd Laboratory Medicine.        SARS-COV-2- Cycle Number 03/21/2022 N/A   Final    Comment: CT (Cycle Threshold) values are surrogate markers of   nucleic acid concentration in a sample. They are non-standard  measurements and should only be interpreted by those familiar   with both PCR technology and the patient's clinical presentation.           Imaging:  MAMMOGRAM   Initial on 07/14/20:  Impression:  Left: Focal Asymmetry: Left breast focal asymmetry at the upper outer middle position. Assessment: 0 - Incomplete. Diagnostic Mammogram and/or Ultrasound is recommended.   Right: There is no mammographic evidence of malignancy in the right breast.  BI-RADS Category:   Overall: 0 - Incomplete: Needs Additional Imaging Evaluation     Diagnostic MMG/US 07/21/20:  Impression: Left breast 04:00 o'clock axis mass corresponds to the left breast screening mammogram finding. BI-RADS 4: Suspicious. Recommend ultrasound-guided biopsy.   BI-RADS Category:   Overall: 4 - Suspicious  Recommendation:  Ultrasound guided biopsy for the left breast     Findings of US: In the left breast 04:00 o'clock axis 5 cm from the  nipple, there is an irregular hypoechoic mass with indistinct margins measuring 0.5 cm by ultrasound. This corresponds to the left breast mammographic finding. No left axillary adenopathy.   Impression:  Left breast 04:00 o'clock axis mass corresponds to the left breast screening mammogram finding. BI-RADS 4: Suspicious. Recommend ultrasound-guided biopsy.   BI-RADS Category:   Overall: 4 - Suspicious     PATHOLOGY  L breast biopsy (08/2020):  LEFT BREAST, MASS, CORE BIOPSY:  Invasive ductal carcinoma, Melina histologic grade 1 (tubular formation: 1, nuclear pleomorphism: 2,  mitotic activity: 1).  Comment: Most of the biopsy tissue is involved by well-differentiated invasive ductal carcinoma. A P63  stain is negative within infiltrating glands which supports the diagnosis. Microcalcifications are identified in  association with invasive carcinoma. The largest continuous focus of invasive carcinoma present measures  7 mm. Dr WALTER Monsivais also reviewed this case and agrees with the diagnosis.  BREAST BIOMARKER RESULTS  Estrogen receptor (ER): Positive (nearly 100%, strong)  Progesterone receptor (MN): Positive (nearly 100%, strong)  HER2 IHC: Negative (0)  Ki-67 proliferation index: 5%    Assessment and Plan       1. Infiltrating ductal carcinoma of left breast       81 years old female patient with a diagnosis of stage IA low grade, well differentiated,  ER strongly + HER2 - Left  IDC s/p lumpectomy (with no radiation)     She is started on adjuvant anastrozole   - Treatment started on 10/1/2020.   She is tolerating well, with no side effects reported   - Will continue treatment to complete 5 years. 10/1/2025  -- continue with calcium and Vitamin D   -- She is on Fosamax 70 mg weekly (prior to therapy), so will not get DEXA scan     Mammogram done 07/2021 showed no evidence of disease     Return in 3 months for a follow-up   Yearly mammogram; next 07/2022    The above was staffed and discussed with   Elijah Otero MD.  PGY V  Hematology/Oncology fellow.

## 2022-03-24 ENCOUNTER — OFFICE VISIT (OUTPATIENT)
Dept: HEMATOLOGY/ONCOLOGY | Facility: CLINIC | Age: 82
End: 2022-03-24
Payer: MEDICARE

## 2022-03-24 VITALS — SYSTOLIC BLOOD PRESSURE: 128 MMHG | DIASTOLIC BLOOD PRESSURE: 62 MMHG | TEMPERATURE: 98 F | HEART RATE: 72 BPM

## 2022-03-24 DIAGNOSIS — Z12.31 ENCOUNTER FOR SCREENING MAMMOGRAM FOR MALIGNANT NEOPLASM OF BREAST: ICD-10-CM

## 2022-03-24 DIAGNOSIS — C50.912 INFILTRATING DUCTAL CARCINOMA OF LEFT BREAST: Primary | ICD-10-CM

## 2022-03-24 DIAGNOSIS — Z85.3 HISTORY OF BREAST CANCER: ICD-10-CM

## 2022-03-24 PROCEDURE — 3288F PR FALLS RISK ASSESSMENT DOCUMENTED: ICD-10-PCS | Mod: CPTII,GC,S$GLB, | Performed by: STUDENT IN AN ORGANIZED HEALTH CARE EDUCATION/TRAINING PROGRAM

## 2022-03-24 PROCEDURE — 99214 OFFICE O/P EST MOD 30 MIN: CPT | Mod: GC,S$GLB,, | Performed by: STUDENT IN AN ORGANIZED HEALTH CARE EDUCATION/TRAINING PROGRAM

## 2022-03-24 PROCEDURE — 1160F PR REVIEW ALL MEDS BY PRESCRIBER/CLIN PHARMACIST DOCUMENTED: ICD-10-PCS | Mod: CPTII,,, | Performed by: STUDENT IN AN ORGANIZED HEALTH CARE EDUCATION/TRAINING PROGRAM

## 2022-03-24 PROCEDURE — 1159F MED LIST DOCD IN RCRD: CPT | Mod: CPTII,,, | Performed by: STUDENT IN AN ORGANIZED HEALTH CARE EDUCATION/TRAINING PROGRAM

## 2022-03-24 PROCEDURE — 1101F PR PT FALLS ASSESS DOC 0-1 FALLS W/OUT INJ PAST YR: ICD-10-PCS | Mod: CPTII,GC,S$GLB, | Performed by: STUDENT IN AN ORGANIZED HEALTH CARE EDUCATION/TRAINING PROGRAM

## 2022-03-24 PROCEDURE — 99999 PR PBB SHADOW E&M-EST. PATIENT-LVL IV: ICD-10-PCS | Mod: PBBFAC,GC,, | Performed by: STUDENT IN AN ORGANIZED HEALTH CARE EDUCATION/TRAINING PROGRAM

## 2022-03-24 PROCEDURE — 1160F RVW MEDS BY RX/DR IN RCRD: CPT | Mod: CPTII,,, | Performed by: STUDENT IN AN ORGANIZED HEALTH CARE EDUCATION/TRAINING PROGRAM

## 2022-03-24 PROCEDURE — 1101F PT FALLS ASSESS-DOCD LE1/YR: CPT | Mod: CPTII,GC,S$GLB, | Performed by: STUDENT IN AN ORGANIZED HEALTH CARE EDUCATION/TRAINING PROGRAM

## 2022-03-24 PROCEDURE — 3074F SYST BP LT 130 MM HG: CPT | Mod: CPTII,GC,S$GLB, | Performed by: STUDENT IN AN ORGANIZED HEALTH CARE EDUCATION/TRAINING PROGRAM

## 2022-03-24 PROCEDURE — 3078F DIAST BP <80 MM HG: CPT | Mod: CPTII,GC,S$GLB, | Performed by: STUDENT IN AN ORGANIZED HEALTH CARE EDUCATION/TRAINING PROGRAM

## 2022-03-24 PROCEDURE — 1126F AMNT PAIN NOTED NONE PRSNT: CPT | Mod: CPTII,GC,S$GLB, | Performed by: STUDENT IN AN ORGANIZED HEALTH CARE EDUCATION/TRAINING PROGRAM

## 2022-03-24 PROCEDURE — 99214 PR OFFICE/OUTPT VISIT, EST, LEVL IV, 30-39 MIN: ICD-10-PCS | Mod: GC,S$GLB,, | Performed by: STUDENT IN AN ORGANIZED HEALTH CARE EDUCATION/TRAINING PROGRAM

## 2022-03-24 PROCEDURE — 1159F PR MEDICATION LIST DOCUMENTED IN MEDICAL RECORD: ICD-10-PCS | Mod: CPTII,,, | Performed by: STUDENT IN AN ORGANIZED HEALTH CARE EDUCATION/TRAINING PROGRAM

## 2022-03-24 PROCEDURE — 3074F PR MOST RECENT SYSTOLIC BLOOD PRESSURE < 130 MM HG: ICD-10-PCS | Mod: CPTII,GC,S$GLB, | Performed by: STUDENT IN AN ORGANIZED HEALTH CARE EDUCATION/TRAINING PROGRAM

## 2022-03-24 PROCEDURE — 99999 PR PBB SHADOW E&M-EST. PATIENT-LVL IV: CPT | Mod: PBBFAC,GC,, | Performed by: STUDENT IN AN ORGANIZED HEALTH CARE EDUCATION/TRAINING PROGRAM

## 2022-03-24 PROCEDURE — 1126F PR PAIN SEVERITY QUANTIFIED, NO PAIN PRESENT: ICD-10-PCS | Mod: CPTII,GC,S$GLB, | Performed by: STUDENT IN AN ORGANIZED HEALTH CARE EDUCATION/TRAINING PROGRAM

## 2022-03-24 PROCEDURE — 3288F FALL RISK ASSESSMENT DOCD: CPT | Mod: CPTII,GC,S$GLB, | Performed by: STUDENT IN AN ORGANIZED HEALTH CARE EDUCATION/TRAINING PROGRAM

## 2022-03-24 PROCEDURE — 3078F PR MOST RECENT DIASTOLIC BLOOD PRESSURE < 80 MM HG: ICD-10-PCS | Mod: CPTII,GC,S$GLB, | Performed by: STUDENT IN AN ORGANIZED HEALTH CARE EDUCATION/TRAINING PROGRAM

## 2022-03-24 PROCEDURE — 99214 OFFICE O/P EST MOD 30 MIN: CPT | Mod: PBBFAC | Performed by: STUDENT IN AN ORGANIZED HEALTH CARE EDUCATION/TRAINING PROGRAM

## 2022-03-28 ENCOUNTER — LAB VISIT (OUTPATIENT)
Dept: LAB | Facility: OTHER | Age: 82
End: 2022-03-28
Payer: MEDICARE

## 2022-03-28 DIAGNOSIS — Z20.822 ENCOUNTER FOR LABORATORY TESTING FOR COVID-19 VIRUS: ICD-10-CM

## 2022-03-28 LAB
SARS-COV-2 RNA RESP QL NAA+PROBE: NOT DETECTED
SARS-COV-2- CYCLE NUMBER: NORMAL

## 2022-03-28 PROCEDURE — U0003 INFECTIOUS AGENT DETECTION BY NUCLEIC ACID (DNA OR RNA); SEVERE ACUTE RESPIRATORY SYNDROME CORONAVIRUS 2 (SARS-COV-2) (CORONAVIRUS DISEASE [COVID-19]), AMPLIFIED PROBE TECHNIQUE, MAKING USE OF HIGH THROUGHPUT TECHNOLOGIES AS DESCRIBED BY CMS-2020-01-R: HCPCS | Performed by: EMERGENCY MEDICINE

## 2022-04-04 ENCOUNTER — LAB VISIT (OUTPATIENT)
Dept: LAB | Facility: OTHER | Age: 82
End: 2022-04-04
Payer: MEDICARE

## 2022-04-04 DIAGNOSIS — Z20.822 ENCOUNTER FOR LABORATORY TESTING FOR COVID-19 VIRUS: ICD-10-CM

## 2022-04-04 PROCEDURE — U0003 INFECTIOUS AGENT DETECTION BY NUCLEIC ACID (DNA OR RNA); SEVERE ACUTE RESPIRATORY SYNDROME CORONAVIRUS 2 (SARS-COV-2) (CORONAVIRUS DISEASE [COVID-19]), AMPLIFIED PROBE TECHNIQUE, MAKING USE OF HIGH THROUGHPUT TECHNOLOGIES AS DESCRIBED BY CMS-2020-01-R: HCPCS | Performed by: EMERGENCY MEDICINE

## 2022-04-05 LAB
SARS-COV-2 RNA RESP QL NAA+PROBE: NOT DETECTED
SARS-COV-2- CYCLE NUMBER: NORMAL

## 2022-04-11 ENCOUNTER — LAB VISIT (OUTPATIENT)
Dept: LAB | Facility: OTHER | Age: 82
End: 2022-04-11
Payer: MEDICARE

## 2022-04-11 DIAGNOSIS — Z20.822 ENCOUNTER FOR LABORATORY TESTING FOR COVID-19 VIRUS: ICD-10-CM

## 2022-04-11 PROCEDURE — U0003 INFECTIOUS AGENT DETECTION BY NUCLEIC ACID (DNA OR RNA); SEVERE ACUTE RESPIRATORY SYNDROME CORONAVIRUS 2 (SARS-COV-2) (CORONAVIRUS DISEASE [COVID-19]), AMPLIFIED PROBE TECHNIQUE, MAKING USE OF HIGH THROUGHPUT TECHNOLOGIES AS DESCRIBED BY CMS-2020-01-R: HCPCS | Performed by: EMERGENCY MEDICINE

## 2022-04-12 LAB
SARS-COV-2 RNA RESP QL NAA+PROBE: NOT DETECTED
SARS-COV-2- CYCLE NUMBER: NORMAL

## 2022-04-18 ENCOUNTER — LAB VISIT (OUTPATIENT)
Dept: LAB | Facility: OTHER | Age: 82
End: 2022-04-18
Payer: MEDICARE

## 2022-04-18 DIAGNOSIS — Z20.822 ENCOUNTER FOR LABORATORY TESTING FOR COVID-19 VIRUS: ICD-10-CM

## 2022-04-18 PROCEDURE — U0003 INFECTIOUS AGENT DETECTION BY NUCLEIC ACID (DNA OR RNA); SEVERE ACUTE RESPIRATORY SYNDROME CORONAVIRUS 2 (SARS-COV-2) (CORONAVIRUS DISEASE [COVID-19]), AMPLIFIED PROBE TECHNIQUE, MAKING USE OF HIGH THROUGHPUT TECHNOLOGIES AS DESCRIBED BY CMS-2020-01-R: HCPCS | Performed by: EMERGENCY MEDICINE

## 2022-04-19 LAB
SARS-COV-2 RNA RESP QL NAA+PROBE: NOT DETECTED
SARS-COV-2- CYCLE NUMBER: NORMAL

## 2022-04-25 ENCOUNTER — LAB VISIT (OUTPATIENT)
Dept: LAB | Facility: OTHER | Age: 82
End: 2022-04-25
Payer: MEDICARE

## 2022-04-25 DIAGNOSIS — Z20.822 ENCOUNTER FOR LABORATORY TESTING FOR COVID-19 VIRUS: ICD-10-CM

## 2022-04-25 PROCEDURE — U0003 INFECTIOUS AGENT DETECTION BY NUCLEIC ACID (DNA OR RNA); SEVERE ACUTE RESPIRATORY SYNDROME CORONAVIRUS 2 (SARS-COV-2) (CORONAVIRUS DISEASE [COVID-19]), AMPLIFIED PROBE TECHNIQUE, MAKING USE OF HIGH THROUGHPUT TECHNOLOGIES AS DESCRIBED BY CMS-2020-01-R: HCPCS | Performed by: EMERGENCY MEDICINE

## 2022-04-26 LAB
SARS-COV-2 RNA RESP QL NAA+PROBE: NOT DETECTED
SARS-COV-2- CYCLE NUMBER: NORMAL

## 2022-04-28 ENCOUNTER — OFFICE VISIT (OUTPATIENT)
Dept: INTERNAL MEDICINE | Facility: CLINIC | Age: 82
End: 2022-04-28
Payer: MEDICARE

## 2022-04-28 VITALS
SYSTOLIC BLOOD PRESSURE: 110 MMHG | WEIGHT: 137 LBS | OXYGEN SATURATION: 95 % | HEART RATE: 88 BPM | DIASTOLIC BLOOD PRESSURE: 48 MMHG | HEIGHT: 60 IN | BODY MASS INDEX: 26.9 KG/M2

## 2022-04-28 DIAGNOSIS — F39 MOOD DISORDER: ICD-10-CM

## 2022-04-28 DIAGNOSIS — F20.89 OTHER SCHIZOPHRENIA: ICD-10-CM

## 2022-04-28 DIAGNOSIS — E55.9 VITAMIN D DEFICIENCY DISEASE: ICD-10-CM

## 2022-04-28 DIAGNOSIS — F70 MILD INTELLECTUAL DISABILITY: ICD-10-CM

## 2022-04-28 DIAGNOSIS — C50.912 INFILTRATING DUCTAL CARCINOMA OF LEFT BREAST: ICD-10-CM

## 2022-04-28 DIAGNOSIS — M81.0 OSTEOPOROSIS, UNSPECIFIED OSTEOPOROSIS TYPE, UNSPECIFIED PATHOLOGICAL FRACTURE PRESENCE: ICD-10-CM

## 2022-04-28 DIAGNOSIS — Z00.00 ROUTINE GENERAL MEDICAL EXAMINATION AT A HEALTH CARE FACILITY: Primary | ICD-10-CM

## 2022-04-28 DIAGNOSIS — M81.0 AGE-RELATED OSTEOPOROSIS WITHOUT CURRENT PATHOLOGICAL FRACTURE: ICD-10-CM

## 2022-04-28 PROCEDURE — 99397 PER PM REEVAL EST PAT 65+ YR: CPT | Mod: S$GLB,,, | Performed by: INTERNAL MEDICINE

## 2022-04-28 PROCEDURE — 99999 PR PBB SHADOW E&M-EST. PATIENT-LVL III: CPT | Mod: PBBFAC,,, | Performed by: INTERNAL MEDICINE

## 2022-04-28 PROCEDURE — 3288F FALL RISK ASSESSMENT DOCD: CPT | Mod: CPTII,S$GLB,, | Performed by: INTERNAL MEDICINE

## 2022-04-28 PROCEDURE — 1159F MED LIST DOCD IN RCRD: CPT | Mod: CPTII,S$GLB,, | Performed by: INTERNAL MEDICINE

## 2022-04-28 PROCEDURE — 99397 PR PREVENTIVE VISIT,EST,65 & OVER: ICD-10-PCS | Mod: S$GLB,,, | Performed by: INTERNAL MEDICINE

## 2022-04-28 PROCEDURE — 99499 UNLISTED E&M SERVICE: CPT | Mod: S$GLB,,, | Performed by: INTERNAL MEDICINE

## 2022-04-28 PROCEDURE — 1101F PT FALLS ASSESS-DOCD LE1/YR: CPT | Mod: CPTII,S$GLB,, | Performed by: INTERNAL MEDICINE

## 2022-04-28 PROCEDURE — 1126F AMNT PAIN NOTED NONE PRSNT: CPT | Mod: CPTII,S$GLB,, | Performed by: INTERNAL MEDICINE

## 2022-04-28 PROCEDURE — 1159F PR MEDICATION LIST DOCUMENTED IN MEDICAL RECORD: ICD-10-PCS | Mod: CPTII,S$GLB,, | Performed by: INTERNAL MEDICINE

## 2022-04-28 PROCEDURE — 99999 PR PBB SHADOW E&M-EST. PATIENT-LVL III: ICD-10-PCS | Mod: PBBFAC,,, | Performed by: INTERNAL MEDICINE

## 2022-04-28 PROCEDURE — 3078F PR MOST RECENT DIASTOLIC BLOOD PRESSURE < 80 MM HG: ICD-10-PCS | Mod: CPTII,S$GLB,, | Performed by: INTERNAL MEDICINE

## 2022-04-28 PROCEDURE — 1126F PR PAIN SEVERITY QUANTIFIED, NO PAIN PRESENT: ICD-10-PCS | Mod: CPTII,S$GLB,, | Performed by: INTERNAL MEDICINE

## 2022-04-28 PROCEDURE — 3078F DIAST BP <80 MM HG: CPT | Mod: CPTII,S$GLB,, | Performed by: INTERNAL MEDICINE

## 2022-04-28 PROCEDURE — 99499 RISK ADDL DX/OHS AUDIT: ICD-10-PCS | Mod: S$GLB,,, | Performed by: INTERNAL MEDICINE

## 2022-04-28 PROCEDURE — 3074F SYST BP LT 130 MM HG: CPT | Mod: CPTII,S$GLB,, | Performed by: INTERNAL MEDICINE

## 2022-04-28 PROCEDURE — 1101F PR PT FALLS ASSESS DOC 0-1 FALLS W/OUT INJ PAST YR: ICD-10-PCS | Mod: CPTII,S$GLB,, | Performed by: INTERNAL MEDICINE

## 2022-04-28 PROCEDURE — 3288F PR FALLS RISK ASSESSMENT DOCUMENTED: ICD-10-PCS | Mod: CPTII,S$GLB,, | Performed by: INTERNAL MEDICINE

## 2022-04-28 PROCEDURE — 3074F PR MOST RECENT SYSTOLIC BLOOD PRESSURE < 130 MM HG: ICD-10-PCS | Mod: CPTII,S$GLB,, | Performed by: INTERNAL MEDICINE

## 2022-04-28 NOTE — PROGRESS NOTES
CHIEF COMPLAINT:  Follow up of osteopenia, mild mentally handicapped, thyroid nodule and schizophrenia.     HPI: This is a 81 year old woman from [facility] who presents with her ,Patti, followup of above. All history is obtained from Patti due to medical problems.      She was diagnosed with breast cancer - ER+, WI+, Her2- IDC of the left breast s/p left breast lumpectomy on 9/4/20. Negative margins. Adjuvant XRT was deferred. Patient started adjuvant endocrine therapy in October of 2020.  She is now taking anastrozole 1 mg daily.     Her feet go to sleep intermittently during the day when she sits too long. Her feet do not go to sleep as much when she is up and moving around.   No neck pain.  She walks with her neck flexed - has been like this for a very long time. t. She has slight knee pain which comes and goes. She is taking tylenol in the evening which is helping.      Nettie is on alendronate 70 mg once weekly for her bones. continues to take Tums twice a day and multivitamin for her history of osteopenia. She had osteopenia on bone mineral density in 2004. Her bone density in 2007 was improved. BMD 4/15 did not suggest treatment. She denies any constipation from the calcium supplement. She has been happy.      Her anxiety has been controlled on Prozac 20 mg daily and abilify 2 mg daily. No depression. Sleeping well. She denies hallucinations or insomnia. She continues to like to live in her group home at [email or web address]. Stable thyroid ultrasound 4/17. . .        PAST MEDICAL HISTORY:   1. Mild mentally handicapped.   2. Schizophrenia.   3. A thyroid nodule, status post fine needle aspirate on 05/15/2003 , 09/2003 and 12/2006 (negative). Ultrasound in 2011 was stable from 2006  4. History of chest pain with a negative stress echocardiogram in February 2003.   5. Osteopenia on bone mineral density June 2004, improved in bone density 4/07. No treatmetn 4/2015  6. Breast cancer diagnosed  2020 - ER+, [place]+, Her2- IDC of the left breast s/p left breast lumpectomy on 20. Negative margins. Adjuvant XRT was deferred. Patient started adjuvant endocrine therapy in 2020.     PAST SURGICAL HISTORY: Tonsillectomy and status post ORIF of the left leg.    MEDICATIONS: updated on epic    No known drug allergies.     SOCIAL HISTORY: No alcohol or tobacco. Resident of [facility].    FAMILY HISTORY: Her father  in his late 30s of a heart attack. Her mother  of Alzheimer's. A brother  in an explosion.     REVIEW OF SYSTEMS: She denies fevers, chills, night sweats, fatigue, visual changes, hearing loss, shortness of breath, chest pain, palpitations, nausea, vomiting, constipation, or diarrhea. She has a bowel movement once a day. She denies dysuria hematuria, polydipsia, polyuria, anemia, seizures, or numbness, joint pain or muscle pain    PHYSICAL EXAM:       GENERAL: She is alert, oriented, in no apparent distress.   Affect within normal limits. Conjunctivae anicteric. Pupils equal, round, and reactive   to light. EOMI. Tympanic membranes clear. Oropharynx clear.   NECK: Supple. No cervical lymphadenopathy. Left lobe of the thyroid was enlarged.. No JVD.   RESPIRATORY: Effort normal.   LUNGS: Clear to auscultation.   HEART: Regular rate and rhythm without murmurs, gallops, or rubs. No lower extremity edema.   ABDOMEN: Soft, nondistended, and nontender. Bowel sounds present. No hepatosplenomegaly  Breasts: Scar left breast, No nodules palpated. No axillary lympadenopathy.   Walking with shuffling gait. Neck flexed  Feet with 2+ dorsalis pedis pulses. No caluses, erythema or warmth.    ASSESSMENT AND PLAN:   Annual exam - discussed diet, exercise and safety issues.    1. Mild mentally handicapped, resident of [facility]. A 90L Form filled out.   2. Schizophrenia, doing well   3. thyroid nodule. Stable thyroid ultrasound   4. Osteoporosis :BMD  - stable. Back on  alendronate since 2017.   5. Breast cancer -  on anastrazole  6. I will check a CBC, CMP, TSH, and fasting lipids, vitamin D level due to the above problems.   7.  LEft knee pain - stable with tylenol  8. Foot numbness - get up and move around. Do not sit too long.  Screening. MMG 7/19/21  She had a normal colonscopy May 2009  I will see Nettie back in 1 year, sooner if problems arise   I see her every 2 weeks at [place].

## 2022-05-02 ENCOUNTER — LAB VISIT (OUTPATIENT)
Dept: LAB | Facility: OTHER | Age: 82
End: 2022-05-02
Payer: MEDICARE

## 2022-05-02 DIAGNOSIS — Z20.822 ENCOUNTER FOR LABORATORY TESTING FOR COVID-19 VIRUS: ICD-10-CM

## 2022-05-02 PROCEDURE — U0003 INFECTIOUS AGENT DETECTION BY NUCLEIC ACID (DNA OR RNA); SEVERE ACUTE RESPIRATORY SYNDROME CORONAVIRUS 2 (SARS-COV-2) (CORONAVIRUS DISEASE [COVID-19]), AMPLIFIED PROBE TECHNIQUE, MAKING USE OF HIGH THROUGHPUT TECHNOLOGIES AS DESCRIBED BY CMS-2020-01-R: HCPCS | Performed by: EMERGENCY MEDICINE

## 2022-05-03 LAB
SARS-COV-2 RNA RESP QL NAA+PROBE: DETECTED
SARS-COV-2- CYCLE NUMBER: 21

## 2022-05-04 ENCOUNTER — LAB VISIT (OUTPATIENT)
Dept: LAB | Facility: HOSPITAL | Age: 82
End: 2022-05-04
Attending: INTERNAL MEDICINE
Payer: MEDICARE

## 2022-05-04 DIAGNOSIS — U07.1 COVID-19 VIRUS DETECTED: ICD-10-CM

## 2022-05-04 DIAGNOSIS — Z79.899 ENCOUNTER FOR LONG-TERM (CURRENT) USE OF OTHER MEDICATIONS: Primary | ICD-10-CM

## 2022-05-04 LAB
ALBUMIN SERPL BCP-MCNC: 3.6 G/DL (ref 3.5–5.2)
ALP SERPL-CCNC: 65 U/L (ref 55–135)
ALT SERPL W/O P-5'-P-CCNC: 34 U/L (ref 10–44)
ANION GAP SERPL CALC-SCNC: 7 MMOL/L (ref 8–16)
AST SERPL-CCNC: 30 U/L (ref 10–40)
BASOPHILS # BLD AUTO: 0.02 K/UL (ref 0–0.2)
BASOPHILS NFR BLD: 0.5 % (ref 0–1.9)
BILIRUB SERPL-MCNC: 0.3 MG/DL (ref 0.1–1)
BUN SERPL-MCNC: 29 MG/DL (ref 8–23)
CALCIUM SERPL-MCNC: 10.1 MG/DL (ref 8.7–10.5)
CHLORIDE SERPL-SCNC: 106 MMOL/L (ref 95–110)
CHOLEST SERPL-MCNC: 150 MG/DL (ref 120–199)
CHOLEST/HDLC SERPL: 3.8 {RATIO} (ref 2–5)
CO2 SERPL-SCNC: 28 MMOL/L (ref 23–29)
CREAT SERPL-MCNC: 0.8 MG/DL (ref 0.5–1.4)
DIFFERENTIAL METHOD: ABNORMAL
EOSINOPHIL # BLD AUTO: 0.1 K/UL (ref 0–0.5)
EOSINOPHIL NFR BLD: 2.3 % (ref 0–8)
ERYTHROCYTE [DISTWIDTH] IN BLOOD BY AUTOMATED COUNT: 12.8 % (ref 11.5–14.5)
EST. GFR  (AFRICAN AMERICAN): >60 ML/MIN/1.73 M^2
EST. GFR  (NON AFRICAN AMERICAN): >60 ML/MIN/1.73 M^2
GLUCOSE SERPL-MCNC: 86 MG/DL (ref 70–110)
HCT VFR BLD AUTO: 34.9 % (ref 37–48.5)
HDLC SERPL-MCNC: 40 MG/DL (ref 40–75)
HDLC SERPL: 26.7 % (ref 20–50)
HGB BLD-MCNC: 10.9 G/DL (ref 12–16)
IMM GRANULOCYTES # BLD AUTO: 0.01 K/UL (ref 0–0.04)
IMM GRANULOCYTES NFR BLD AUTO: 0.2 % (ref 0–0.5)
LDLC SERPL CALC-MCNC: 86.2 MG/DL (ref 63–159)
LYMPHOCYTES # BLD AUTO: 1.6 K/UL (ref 1–4.8)
LYMPHOCYTES NFR BLD: 36 % (ref 18–48)
MCH RBC QN AUTO: 32.7 PG (ref 27–31)
MCHC RBC AUTO-ENTMCNC: 31.2 G/DL (ref 32–36)
MCV RBC AUTO: 105 FL (ref 82–98)
MONOCYTES # BLD AUTO: 0.4 K/UL (ref 0.3–1)
MONOCYTES NFR BLD: 9.5 % (ref 4–15)
NEUTROPHILS # BLD AUTO: 2.3 K/UL (ref 1.8–7.7)
NEUTROPHILS NFR BLD: 51.5 % (ref 38–73)
NONHDLC SERPL-MCNC: 110 MG/DL
NRBC BLD-RTO: 0 /100 WBC
PLATELET # BLD AUTO: 230 K/UL (ref 150–450)
PMV BLD AUTO: 12.8 FL (ref 9.2–12.9)
POTASSIUM SERPL-SCNC: 4.5 MMOL/L (ref 3.5–5.1)
PROT SERPL-MCNC: 7.5 G/DL (ref 6–8.4)
RBC # BLD AUTO: 3.33 M/UL (ref 4–5.4)
SODIUM SERPL-SCNC: 141 MMOL/L (ref 136–145)
T4 FREE SERPL-MCNC: 0.94 NG/DL (ref 0.71–1.51)
TRIGL SERPL-MCNC: 119 MG/DL (ref 30–150)
TSH SERPL DL<=0.005 MIU/L-ACNC: 0.31 UIU/ML (ref 0.4–4)
WBC # BLD AUTO: 4.44 K/UL (ref 3.9–12.7)

## 2022-05-04 PROCEDURE — 36415 COLL VENOUS BLD VENIPUNCTURE: CPT | Performed by: INTERNAL MEDICINE

## 2022-05-04 PROCEDURE — 84439 ASSAY OF FREE THYROXINE: CPT | Performed by: INTERNAL MEDICINE

## 2022-05-04 PROCEDURE — 80053 COMPREHEN METABOLIC PANEL: CPT | Performed by: INTERNAL MEDICINE

## 2022-05-04 PROCEDURE — 80061 LIPID PANEL: CPT | Performed by: INTERNAL MEDICINE

## 2022-05-04 PROCEDURE — 85025 COMPLETE CBC W/AUTO DIFF WBC: CPT | Performed by: INTERNAL MEDICINE

## 2022-05-04 PROCEDURE — 84443 ASSAY THYROID STIM HORMONE: CPT | Performed by: INTERNAL MEDICINE

## 2022-05-09 ENCOUNTER — PATIENT OUTREACH (OUTPATIENT)
Dept: INFECTIOUS DISEASES | Facility: HOSPITAL | Age: 82
End: 2022-05-09
Payer: MEDICARE

## 2022-05-09 NOTE — TELEPHONE ENCOUNTER
Pt is resident of [facility], sent epic message to medical director to notify meets criteria for MAB.

## 2022-07-11 ENCOUNTER — HOSPITAL ENCOUNTER (OUTPATIENT)
Dept: RADIOLOGY | Facility: HOSPITAL | Age: 82
Discharge: HOME | End: 2022-07-11
Attending: STUDENT IN AN ORGANIZED HEALTH CARE EDUCATION/TRAINING PROGRAM
Payer: MEDICARE

## 2022-07-11 DIAGNOSIS — Z12.31 ENCOUNTER FOR SCREENING MAMMOGRAM FOR MALIGNANT NEOPLASM OF BREAST: ICD-10-CM

## 2022-07-11 DIAGNOSIS — Z85.3 HISTORY OF BREAST CANCER: ICD-10-CM

## 2022-07-11 PROCEDURE — 77063 MAMMO DIGITAL SCREENING BILAT WITH TOMO: ICD-10-PCS | Mod: 26,,, | Performed by: RADIOLOGY

## 2022-07-11 PROCEDURE — 77063 BREAST TOMOSYNTHESIS BI: CPT | Mod: 26,,, | Performed by: RADIOLOGY

## 2022-07-11 PROCEDURE — 77067 SCR MAMMO BI INCL CAD: CPT | Mod: 26,,, | Performed by: RADIOLOGY

## 2022-07-11 PROCEDURE — 77067 SCR MAMMO BI INCL CAD: CPT | Mod: TC

## 2022-07-11 PROCEDURE — 77067 MAMMO DIGITAL SCREENING BILAT WITH TOMO: ICD-10-PCS | Mod: 26,,, | Performed by: RADIOLOGY

## 2022-07-13 ENCOUNTER — OFFICE VISIT (OUTPATIENT)
Dept: HEMATOLOGY/ONCOLOGY | Facility: CLINIC | Age: 82
End: 2022-07-13
Payer: MEDICARE

## 2022-07-13 VITALS
OXYGEN SATURATION: 98 % | TEMPERATURE: 98 F | SYSTOLIC BLOOD PRESSURE: 121 MMHG | RESPIRATION RATE: 18 BRPM | DIASTOLIC BLOOD PRESSURE: 68 MMHG | HEIGHT: 64 IN | BODY MASS INDEX: 23.52 KG/M2 | HEART RATE: 82 BPM

## 2022-07-13 DIAGNOSIS — C50.912 INFILTRATING DUCTAL CARCINOMA OF LEFT BREAST: Primary | ICD-10-CM

## 2022-07-13 DIAGNOSIS — F70 MILD INTELLECTUAL DISABILITY: ICD-10-CM

## 2022-07-13 DIAGNOSIS — F20.9 SCHIZOPHRENIA, UNSPECIFIED TYPE: ICD-10-CM

## 2022-07-13 DIAGNOSIS — Z79.811 PROPHYLACTIC USE OF ANASTROZOLE (ARIMIDEX): ICD-10-CM

## 2022-07-13 PROCEDURE — 99214 OFFICE O/P EST MOD 30 MIN: CPT | Mod: PBBFAC | Performed by: INTERNAL MEDICINE

## 2022-07-13 PROCEDURE — 1159F MED LIST DOCD IN RCRD: CPT | Mod: CPTII,S$GLB,, | Performed by: INTERNAL MEDICINE

## 2022-07-13 PROCEDURE — 99214 OFFICE O/P EST MOD 30 MIN: CPT | Mod: S$GLB,,, | Performed by: INTERNAL MEDICINE

## 2022-07-13 PROCEDURE — 3078F PR MOST RECENT DIASTOLIC BLOOD PRESSURE < 80 MM HG: ICD-10-PCS | Mod: CPTII,S$GLB,, | Performed by: INTERNAL MEDICINE

## 2022-07-13 PROCEDURE — 99214 PR OFFICE/OUTPT VISIT, EST, LEVL IV, 30-39 MIN: ICD-10-PCS | Mod: S$GLB,,, | Performed by: INTERNAL MEDICINE

## 2022-07-13 PROCEDURE — 1101F PT FALLS ASSESS-DOCD LE1/YR: CPT | Mod: CPTII,S$GLB,, | Performed by: INTERNAL MEDICINE

## 2022-07-13 PROCEDURE — 1126F PR PAIN SEVERITY QUANTIFIED, NO PAIN PRESENT: ICD-10-PCS | Mod: CPTII,S$GLB,, | Performed by: INTERNAL MEDICINE

## 2022-07-13 PROCEDURE — 3074F SYST BP LT 130 MM HG: CPT | Mod: CPTII,S$GLB,, | Performed by: INTERNAL MEDICINE

## 2022-07-13 PROCEDURE — 99999 PR PBB SHADOW E&M-EST. PATIENT-LVL IV: CPT | Mod: PBBFAC,,, | Performed by: INTERNAL MEDICINE

## 2022-07-13 PROCEDURE — 1101F PR PT FALLS ASSESS DOC 0-1 FALLS W/OUT INJ PAST YR: ICD-10-PCS | Mod: CPTII,S$GLB,, | Performed by: INTERNAL MEDICINE

## 2022-07-13 PROCEDURE — 3074F PR MOST RECENT SYSTOLIC BLOOD PRESSURE < 130 MM HG: ICD-10-PCS | Mod: CPTII,S$GLB,, | Performed by: INTERNAL MEDICINE

## 2022-07-13 PROCEDURE — 99999 PR PBB SHADOW E&M-EST. PATIENT-LVL IV: ICD-10-PCS | Mod: PBBFAC,,, | Performed by: INTERNAL MEDICINE

## 2022-07-13 PROCEDURE — 1159F PR MEDICATION LIST DOCUMENTED IN MEDICAL RECORD: ICD-10-PCS | Mod: CPTII,S$GLB,, | Performed by: INTERNAL MEDICINE

## 2022-07-13 PROCEDURE — 3288F PR FALLS RISK ASSESSMENT DOCUMENTED: ICD-10-PCS | Mod: CPTII,S$GLB,, | Performed by: INTERNAL MEDICINE

## 2022-07-13 PROCEDURE — 1126F AMNT PAIN NOTED NONE PRSNT: CPT | Mod: CPTII,S$GLB,, | Performed by: INTERNAL MEDICINE

## 2022-07-13 PROCEDURE — 3078F DIAST BP <80 MM HG: CPT | Mod: CPTII,S$GLB,, | Performed by: INTERNAL MEDICINE

## 2022-07-13 PROCEDURE — 3288F FALL RISK ASSESSMENT DOCD: CPT | Mod: CPTII,S$GLB,, | Performed by: INTERNAL MEDICINE

## 2022-07-13 NOTE — PROGRESS NOTES
Subjective:       Patient ID: Nettie Villasenor is a 81 y.o. female.    Chief Complaint: No chief complaint on file.    HPI     Ms. Villasenor presents today for follow-up.  This is the first time that I am seeing her.  She was previously followed by Dr. Nava and Dr. Otero.   I have reviewed the pertinent notes and I will summarize her course as follows:  Briefly, she is 81 years old, and she lives at the [facility].  She has a history of schizophrenia and mental retardation, and has been at the nursing home for more than 40 years.    She was diagnosed with breast cancer in August 2020 and underwent an FNA that showed a carcinoma measuring 7 mm in diameter.  Her cancer was grade 1, ER pr strongly positive, IA strongly positive and HER2 negative with a Ki-67 of 5%.  She underwent a lumpectomy on September 4, 2020 by Dr. Benites.  The pathology report from the procedure indicates that there was no residual carcinoma.  Given her age, no axillary dissection was performed since her axilla was clinically negative.  Also, given her small grade 1 cancer and her age, it was felt that radiation was not indicated.    She was referred for adjuvant endocrine therapy and started Anastrozole 10/2020.        Review of Systems    Overall she feels well. She appears comfortable and has no complaints.  She is here with a caregiver.  According to the caregiver, there has been no acute issues, and she has tolerated her treatment well    Objective:      Physical Exam      She is alert, oriented to self only, pleasant, well      nourished, in no acute physical distress.   She was unable to climb on the examination table and she was examined on her wheelchair                                    VITAL SIGNS:  Reviewed                                      HEENT:  Normal.  There are no nasal, oral, lip, gingival, auricular, lid,    or conjunctival lesions.  Mucosae are moist and pink, and there is no        thrush.  Pupils are  equal, reactive to light and accommodation.              Extraocular muscle movements are intact.  Dentition is fair.  There is no frontal or maxillary tenderness.                                     NECK:  Supple without JVD, adenopathy, or thyromegaly.                       LUNGS:  Clear to auscultation without wheezing, rales, or rhonchi.           CARDIOVASCULAR:  Reveals an S1, S2, no murmurs, no rubs, no gallops.         ABDOMEN:  Soft, nontender, without organomegaly.  Bowel sounds are    present.                                                                     EXTREMITIES:  No cyanosis, clubbing, or edema.                               BREASTS:  She is status post left lumpectomy with a well-healed lateral incision at the 3 o'clock position.    There are no masses in either breast.                                        LYMPHATIC:  There is no cervical, axillary, or supraclavicular adenopathy.   SKIN:  Warm and moist, without petechiae, rashes, induration, or ecchymoses.           NEUROLOGIC:  DTRs are 0-1+ bilaterally, symmetrical, motor function is 5/5,  and cranial nerves are  within normal limits.    Assessment:       1.  History of stage I breast cancer status post simple lumpectomy, clinically DYLAN, doing well    2.  Adjuvant use of anastrozole  Plan:         I have explained to her the caregiver that she needs to remain on anastrozole for 5 years.  Assuming her mammogram from 07/11/2022 is read as BI-RADS 2, she will be seen again in 4-6 months.  Her caregiver's questions were answered.  More than 30 minutes spent reviewing the chart, examining the patient, and coordinating her care.

## 2022-07-27 DIAGNOSIS — C50.912 INFILTRATING DUCTAL CARCINOMA OF LEFT BREAST: ICD-10-CM

## 2022-07-27 RX ORDER — ANASTROZOLE 1 MG/1
1 TABLET ORAL DAILY
Qty: 90 TABLET | Refills: 3 | Status: SHIPPED | OUTPATIENT
Start: 2022-07-27 | End: 2023-07-27

## 2022-07-28 ENCOUNTER — HOSPITAL ENCOUNTER (EMERGENCY)
Facility: HOSPITAL | Age: 82
Discharge: HOME | End: 2022-07-28
Attending: EMERGENCY MEDICINE
Payer: MEDICARE

## 2022-07-28 VITALS
DIASTOLIC BLOOD PRESSURE: 66 MMHG | OXYGEN SATURATION: 100 % | TEMPERATURE: 98 F | RESPIRATION RATE: 20 BRPM | HEART RATE: 74 BPM | BODY MASS INDEX: 23.52 KG/M2 | SYSTOLIC BLOOD PRESSURE: 122 MMHG | WEIGHT: 137 LBS

## 2022-07-28 DIAGNOSIS — S01.01XA LACERATION OF SCALP, INITIAL ENCOUNTER: ICD-10-CM
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
## 2022-07-28 DIAGNOSIS — S09.90XA CLOSED HEAD INJURY, INITIAL ENCOUNTER: ICD-10-CM

## 2022-07-28 PROCEDURE — 63600175 PHARM REV CODE 636 W HCPCS: Performed by: EMERGENCY MEDICINE

## 2022-07-28 PROCEDURE — 90715 TDAP VACCINE 7 YRS/> IM: CPT | Performed by: EMERGENCY MEDICINE

## 2022-07-28 PROCEDURE — 25000003 PHARM REV CODE 250: Performed by: EMERGENCY MEDICINE

## 2022-07-28 PROCEDURE — 12001 RPR S/N/AX/GEN/TRNK 2.5CM/<: CPT

## 2022-07-28 PROCEDURE — 12001 RPR S/N/AX/GEN/TRNK 2.5CM/<: CPT | Mod: ,,, | Performed by: EMERGENCY MEDICINE

## 2022-07-28 PROCEDURE — 99284 PR EMERGENCY DEPT VISIT,LEVEL IV: ICD-10-PCS | Mod: 25,,, | Performed by: EMERGENCY MEDICINE

## 2022-07-28 PROCEDURE — 99284 EMERGENCY DEPT VISIT MOD MDM: CPT | Mod: 25,,, | Performed by: EMERGENCY MEDICINE

## 2022-07-28 PROCEDURE — 12001 PR RESUPERF WND BODY <2.5CM: ICD-10-PCS | Mod: ,,, | Performed by: EMERGENCY MEDICINE

## 2022-07-28 PROCEDURE — 99284 EMERGENCY DEPT VISIT MOD MDM: CPT | Mod: 25

## 2022-07-28 PROCEDURE — 90471 IMMUNIZATION ADMIN: CPT | Performed by: EMERGENCY MEDICINE

## 2022-07-28 RX ORDER — ACETAMINOPHEN 325 MG/1
650 TABLET ORAL
Status: COMPLETED | OUTPATIENT
Start: 2022-07-28 | End: 2022-07-28

## 2022-07-28 RX ORDER — BACITRACIN 500 [USP'U]/G
OINTMENT TOPICAL
Status: DISCONTINUED | OUTPATIENT
Start: 2022-07-28 | End: 2022-07-28 | Stop reason: HOSPADM

## 2022-07-28 RX ADMIN — TETANUS TOXOID, REDUCED DIPHTHERIA TOXOID AND ACELLULAR PERTUSSIS VACCINE, ADSORBED 0.5 ML: 5; 2.5; 8; 8; 2.5 SUSPENSION INTRAMUSCULAR at 06:07

## 2022-07-28 RX ADMIN — ACETAMINOPHEN 650 MG: 325 TABLET ORAL at 07:07

## 2022-07-28 NOTE — DISCHARGE INSTRUCTIONS
Your imaging today shows a thyroid nodule.  Discuss this with your primary physician at follow up and consider advanced imaging such as thyroid ultrasound.

## 2022-07-28 NOTE — ED PROVIDER NOTES
History:  Nettie Villasenor is a 81 y.o. female who presents to the ED with Fall (PT is from [facility]. Pt c/o headache following fall this morning. Pt fell and hit her head on her walker when she got up to go to the bathroom. Per cargiver Pt was slightly confused following fall. Pt is AAOx3 in triage. Open wound noted to L side of head. Unknown LOC. Pt takes 81 mg aspirin daily. )    Described as 81-year-old female with a history of mild intellectual disability and schizophrenia presenting to the emergency department after a fall.  She reports she got up to use the bathroom and tripped over her walker, striking her head.  No LOC, no nausea or vomiting.  Now with a mild, constant, nonradiating pain in her head.  She denies any neck or back pain. She does take aspirin daily.     Review of Systems: All systems reviewed and are negative except as per history of present illness.  Constitutional: Negative for fever.   HENT: Negative for congestion.    Respiratory: Negative for shortness of breath.    Cardiovascular: Negative for chest pain.   Gastrointestinal: Negative for abdominal pain.   Genitourinary: Negative for dysuria.   Musculoskeletal: Negative for myalgias.   Skin: Negative for rash. +wound  Neurological: Negative for focal weakness. +headache  Psychiatric/Behavioral: Negative for memory loss.     Medications:   Previous Medications    ACETAMINOPHEN (TYLENOL) 500 MG TABLET    Take 1,000 mg by mouth every evening.    ALENDRONATE (FOSAMAX) 70 MG TABLET    TAKE 1 TABLET BY MOUTH EVERY 7 DAYS    ANASTROZOLE (ARIMIDEX) 1 MG TAB    Take 1 tablet (1 mg total) by mouth once daily.    ARIPIPRAZOLE (ABILIFY) 2 MG TAB    TAKE 1 TABLET BY MOUTH EVERY EVENING at bedtime    ASPIRIN (ECOTRIN) 81 MG EC TABLET    TAKE 1 TABLET BY MOUTH daily TO PREVENT CLOTS    ASPIRIN 81 MG CHEW    Take 81 mg by mouth once daily.    CALCIUM CARBONATE (OS-OTTONIEL) 500 MG CALCIUM (1,250 MG) TABLET    Take 1 tablet by mouth 2 (two) times  daily.    COVID-19 VACC,MRNA,MODERNA,/PF (MODERNA COVID-19 VACCINE, EUA, IM)    ADMINISTER 0.5ML IN THE MUSCLE AS DIRECTED    DOCUSATE SODIUM (COLACE) 100 MG CAPSULE    Take 100 mg by mouth once daily.    FEXOFENADINE (ALLEGRA) 180 MG TABLET    Take 180 mg by mouth daily as needed.    FLUOXETINE 20 MG CAPSULE    TAKE 1 CAPSULE BY MOUTH every morning    HYDROCODONE-ACETAMINOPHEN (NORCO) 5-325 MG PER TABLET    Take 1 tablet by mouth every 6 (six) hours as needed for Pain.    LORAZEPAM (ATIVAN) 0.5 MG TABLET    Take 0.5 mg by mouth every 12 (twelve) hours as needed.    MULTIVITAMIN CAPSULE    Take 1 capsule by mouth once daily.    VITAMIN D 1000 UNITS TAB    Take 185 mg by mouth once daily.       PMH:   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    Osteopenia 3/13/2013    Schizophrenia 3/13/2013    Thyroid nodule      PSH:   Past Surgical History:   Procedure Laterality Date    BREAST BIOPSY Left     BREAST LUMPECTOMY      LUMPECTOMY,BREAST,WITH RADIOACTIVE SEED LOCALIZATION Left 09/04/2020    Procedure: LUMPECTOMY,BREAST,WITH RADIOACTIVE SEED LOCALIZATION reflector placed on 9/2/20;  Surgeon: Los Benites MD;  Location: [facility];  Service: General;  Laterality: Left;    ORIF left lower leg      TONSILLECTOMY       Allergies: She has No Known Allergies.  Social History: Marital Status: single. She  reports that she has never smoked. She has never used smokeless tobacco.. She  reports no history of alcohol use..       Exam:  VITAL SIGNS:   Vitals:    07/28/22 0426 07/28/22 0650   BP: 137/62 122/66   Pulse: 74 74   Resp: 16 20   Temp: 97.6 °F (36.4 °C)    TempSrc: Oral    SpO2: 100%    Weight: 62.1 kg (137 lb)      Const: Awake and alert, NAD  Head: 1.5cm laceration L temporal scalp, no FB, no active bleeding  Eyes: Normal Conjunctiva, PERRL, EOMI  ENT: Normal External Ears, Nose and Mouth.  Neck: Full range  of motion. No meningismus. No midline TTP, no stepoffs  Resp: Normal respiratory effort, No distress  Cardio: Equal and intact distal pulses  Abd: Soft, non tender, non distended.  Back: No midline TTP, no stepoffs  Skin: Scalp laceration as above. Skin tear L elbow  Ext: No cyanosis, or edema  Neur: Awake and alert, GCS 15, moves all extremities equally  Psych: Normal Mood and Affect    Data:  Imaging Results           CT Cervical Spine Without Contrast (Final result)  Result time 07/28/22 06:45:04    Final result by Naa Clarke MD (07/28/22 06:45:04)                 Impression:      1. Findings in keeping with left temporoparietal scalp soft tissue injury/laceration.  No CT evidence of acute intracranial abnormality allowing for patient motion artifact.  Clinical correlation and further evaluation as warranted.  2. No CT evidence of acute cervical spine fracture or traumatic subluxation.  Clinical correlation and further evaluation as warranted.  Multilevel degenerative change of the cervical spine.  3. Partially visualized abnormal soft tissue density within the left neck/thyroid region concerning for markedly enlarged left thyroid lobe/mass in this patient with previously demonstrated multinodular thyroid goiter noting there is significant rightward tracheal displacement secondary to this lesion.  Correlation with any available recent exams is advised.  Further evaluation with dedicated thyroid ultrasound or contrast enhanced CT neck is advised.  4. Right upper lobe pulmonary nodules measuring up to 4 mm.  For a ground glass nodule <6 mm, Fleischner Society 2017 guidelines recommend no routine follow up. However, suspicious features could warrant follow up with non-contrast chest CT at 2 years and 4 years after discovery.  This report was flagged in Epic as abnormal.    Electronically signed by resident: Negar Rogers  Date:    07/28/2022  Time:    05:56    Electronically signed by: Naa Clarke  MD  Date:    07/28/2022  Time:    06:45             Narrative:    EXAMINATION:  CT HEAD WITHOUT CONTRAST; CT CERVICAL SPINE WITHOUT CONTRAST    CLINICAL HISTORY:  Head trauma, minor (Age >= 65y);; Neck trauma (Age >= 65y);    TECHNIQUE:  Low dose axial CT images obtained throughout the head without the use of intravenous contrast.  Axial, sagittal and coronal reconstructions were performed.    COMPARISON:  CT head without contrast 12/10/2010 and CT soft tissue neck 03/18/2003.    FINDINGS:  CT head:    Please note image quality is degraded by patient motion artifact.  There is left temporoparietal scalp soft tissue swelling/hematoma with irregularity suggestive underlying laceration/soft tissue injury.  There is no acute intracranial hemorrhage, hydrocephalus, midline shift or mass effect.  There is generalized cerebral volume loss with compensatory prominence of cerebral sulci and the ventricular system.  There is periventricular white matter hypoattenuation which is nonspecific but likely reflect sequela of chronic microvascular ischemic change prior it probable small bilateral basal ganglia lacunar type infarcts.  The basal cisterns are patent.  There is atherosclerotic plaquing of the cavernous and supraclinoid segments of the ICAs.  The visualized paranasal sinuses and mastoid air cells are clear of acute process.  The calvarium is intact.    CT cervical spine:    There is minimal anterolisthesis of C2 on C3, C3 on C4 and C4 on C5, presumably degenerative in etiology noting significant facet arthropathy at these levels.  There is also straightening of normal cervical lordosis.  Otherwise, vertebral body alignment is within normal limits.  Vertebral body heights appear maintained.  The facet joints articulate appropriately.  No significant prevertebral soft tissue swelling.  There is multilevel degenerative change of the cervical spine primarily consisting of multiple level uncovertebral joint DJD and facet  arthropathy (right greater than left).  There is abnormal soft tissue density in the visualized left neck, possibly reflecting markedly enlarged left thyroid lobe nodule in this patient with previously demonstrated multinodular goiter on CT and ultrasound exams noting there is significant rightward tracheal deviation.  Correlation with thyroid ultrasound or contrast enhanced CT of the neck can be performed for more definitive evaluation.   There are pulmonary nodules in the right upper lobe measuring up to 4 mm.    Tissue: Partially visualized large left mediastinal mass with rightward displacement of the trachea measuring 5.0 x 4.1 cm in AP by TV dimensions (axial series 2, image 317).  Multiple pulmonary nodules in the partially visualized lung apices measuring up to 0.4 cm.                                CT Head Without Contrast (Final result)  Result time 07/28/22 06:45:04    Final result by Naa Clarke MD (07/28/22 06:45:04)                 Impression:      1. Findings in keeping with left temporoparietal scalp soft tissue injury/laceration.  No CT evidence of acute intracranial abnormality allowing for patient motion artifact.  Clinical correlation and further evaluation as warranted.  2. No CT evidence of acute cervical spine fracture or traumatic subluxation.  Clinical correlation and further evaluation as warranted.  Multilevel degenerative change of the cervical spine.  3. Partially visualized abnormal soft tissue density within the left neck/thyroid region concerning for markedly enlarged left thyroid lobe/mass in this patient with previously demonstrated multinodular thyroid goiter noting there is significant rightward tracheal displacement secondary to this lesion.  Correlation with any available recent exams is advised.  Further evaluation with dedicated thyroid ultrasound or contrast enhanced CT neck is advised.  4. Right upper lobe pulmonary nodules measuring up to 4 mm.  For a ground glass  nodule <6 mm, Fleischner Society 2017 guidelines recommend no routine follow up. However, suspicious features could warrant follow up with non-contrast chest CT at 2 years and 4 years after discovery.  This report was flagged in Epic as abnormal.    Electronically signed by resident: Negar Rogers  Date:    07/28/2022  Time:    05:56    Electronically signed by: Naa Clarke MD  Date:    07/28/2022  Time:    06:45             Narrative:    EXAMINATION:  CT HEAD WITHOUT CONTRAST; CT CERVICAL SPINE WITHOUT CONTRAST    CLINICAL HISTORY:  Head trauma, minor (Age >= 65y);; Neck trauma (Age >= 65y);    TECHNIQUE:  Low dose axial CT images obtained throughout the head without the use of intravenous contrast.  Axial, sagittal and coronal reconstructions were performed.    COMPARISON:  CT head without contrast 12/10/2010 and CT soft tissue neck 03/18/2003.    FINDINGS:  CT head:    Please note image quality is degraded by patient motion artifact.  There is left temporoparietal scalp soft tissue swelling/hematoma with irregularity suggestive underlying laceration/soft tissue injury.  There is no acute intracranial hemorrhage, hydrocephalus, midline shift or mass effect.  There is generalized cerebral volume loss with compensatory prominence of cerebral sulci and the ventricular system.  There is periventricular white matter hypoattenuation which is nonspecific but likely reflect sequela of chronic microvascular ischemic change prior it probable small bilateral basal ganglia lacunar type infarcts.  The basal cisterns are patent.  There is atherosclerotic plaquing of the cavernous and supraclinoid segments of the ICAs.  The visualized paranasal sinuses and mastoid air cells are clear of acute process.  The calvarium is intact.    CT cervical spine:    There is minimal anterolisthesis of C2 on C3, C3 on C4 and C4 on C5, presumably degenerative in etiology noting significant facet arthropathy at these levels.  There is also  straightening of normal cervical lordosis.  Otherwise, vertebral body alignment is within normal limits.  Vertebral body heights appear maintained.  The facet joints articulate appropriately.  No significant prevertebral soft tissue swelling.  There is multilevel degenerative change of the cervical spine primarily consisting of multiple level uncovertebral joint DJD and facet arthropathy (right greater than left).  There is abnormal soft tissue density in the visualized left neck, possibly reflecting markedly enlarged left thyroid lobe nodule in this patient with previously demonstrated multinodular goiter on CT and ultrasound exams noting there is significant rightward tracheal deviation.  Correlation with thyroid ultrasound or contrast enhanced CT of the neck can be performed for more definitive evaluation.   There are pulmonary nodules in the right upper lobe measuring up to 4 mm.    Tissue: Partially visualized large left mediastinal mass with rightward displacement of the trachea measuring 5.0 x 4.1 cm in AP by TV dimensions (axial series 2, image 317).  Multiple pulmonary nodules in the partially visualized lung apices measuring up to 0.4 cm.                              Labs & Imaging studies were reviewed independently by me.    Lac Repair    Date/Time: 7/28/2022 7:34 AM  Performed by: Lissa Pelaez MD  Authorized by: Lissa Pelaez MD     Consent:     Consent obtained:  Verbal    Consent given by:  Patient and healthcare agent    Risks, benefits, and alternatives were discussed: yes      Risks discussed:  Infection, pain and retained foreign body    Alternatives discussed:  No treatment  Universal protocol:     Procedure explained and questions answered to patient or proxy's satisfaction: yes      Imaging studies available: yes    Anesthesia:     Anesthesia method:  None  Laceration details:     Location:  Scalp    Scalp location:  L temporal    Length (cm):  1.5  Pre-procedure details:      Preparation:  Imaging obtained to evaluate for foreign bodies  Exploration:     Hemostasis achieved with:  Direct pressure    Imaging outcome: foreign body not noted      Contaminated: no    Treatment:     Area cleansed with:  Saline    Amount of cleaning:  Standard    Irrigation solution:  Sterile water    Debridement:  None    Undermining:  None  Skin repair:     Repair method:  Tissue adhesive (Hair apposition)  Approximation:     Approximation:  Close  Repair type:     Repair type:  Simple  Post-procedure details:     Dressing:  Open (no dressing)    Procedure completion:  Tolerated well, no immediate complications       Medical Decision Makin-year-old female presenting to the emergency department after a mechanical fall. Scalp laceration repaired, Tdap updated. CT head negative for ICH. CT C-spine negative for fracture or dislocation. Skin tear L elbow dressed. Full ROM, nontender to palpation, doubt fracture/dislocation. Patient stable for DC back to SNF.    Clinical Impression:  1. Fall, initial encounter    2. Closed head injury, initial encounter    3. Laceration of scalp, initial encounter             Medication List      ASK your doctor about these medications    acetaminophen 500 MG tablet  Commonly known as: TYLENOL     alendronate 70 MG tablet  Commonly known as: FOSAMAX  TAKE 1 TABLET BY MOUTH EVERY 7 DAYS     anastrozole 1 mg Tab  Commonly known as: ARIMIDEX  Take 1 tablet (1 mg total) by mouth once daily.     ARIPiprazole 2 MG Tab  Commonly known as: ABILIFY  TAKE 1 TABLET BY MOUTH EVERY EVENING at bedtime     * aspirin 81 MG Chew     * aspirin 81 MG EC tablet  Commonly known as: ECOTRIN  TAKE 1 TABLET BY MOUTH daily TO PREVENT CLOTS     calcium carbonate 500 mg calcium (1,250 mg) tablet  Commonly known as: OS-OTTONIEL     docusate sodium 100 MG capsule  Commonly known as: COLACE     fexofenadine 180 MG tablet  Commonly known as: ALLEGRA     FLUoxetine 20 MG capsule  TAKE 1 CAPSULE BY MOUTH every  morning     HYDROcodone-acetaminophen 5-325 mg per tablet  Commonly known as: NORCO  Take 1 tablet by mouth every 6 (six) hours as needed for Pain.     LORazepam 0.5 MG tablet  Commonly known as: ATIVAN     MODERNA COVID-19 VACCINE (EUA) IM     multivitamin capsule     vitamin D 1000 units Tab  Commonly known as: VITAMIN D3         * This list has 2 medication(s) that are the same as other medications prescribed for you. Read the directions carefully, and ask your doctor or other care provider to review them with you.                Follow-up Information     Carolina Chavez MD. Schedule an appointment as soon as possible for a visit in 2 days.    Specialty: Internal Medicine  Why: For wound re-check  Contact information:  [address] [facility] [address] [phone]                      bharti Pelaez MD  07/28/22 3619

## 2022-07-28 NOTE — ED NOTES
Nettie Villasenor, a 81 y.o. female presents to the ED w/ complaint of fall with head injury. PT states she slipped on the bathroom rug and hit her head on her walker this am. Pt denies loc. Bleeding controlled. Small lac to left side of scalp noted. Small skin teat to left elbow present.    Triage note:  Chief Complaint   Patient presents with    Fall     PT is from [facility]. Pt c/o headache following fall this morning. Pt fell and hit her head on her walker when she got up to go to the bathroom. Per cargiver Pt was slightly confused following fall. Pt is AAOx3 in triage. Open wound noted to L side of head. Unknown LOC. Pt takes 81 mg aspirin daily.      Review of patient's allergies indicates:  No Known Allergies  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    Osteopenia 3/13/2013    Schizophrenia 3/13/2013    Thyroid nodule

## 2022-08-01 ENCOUNTER — OFFICE VISIT (OUTPATIENT)
Dept: INTERNAL MEDICINE | Facility: CLINIC | Age: 82
End: 2022-08-01
Payer: MEDICARE

## 2022-08-01 VITALS
SYSTOLIC BLOOD PRESSURE: 120 MMHG | BODY MASS INDEX: 24.01 KG/M2 | WEIGHT: 140.63 LBS | OXYGEN SATURATION: 99 % | DIASTOLIC BLOOD PRESSURE: 60 MMHG | HEIGHT: 64 IN | HEART RATE: 82 BPM

## 2022-08-01 DIAGNOSIS — E04.1 THYROID NODULE: ICD-10-CM

## 2022-08-01 DIAGNOSIS — R91.1 SOLITARY PULMONARY NODULE: ICD-10-CM
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
## 2022-08-01 DIAGNOSIS — S01.01XD LACERATION OF SCALP, SUBSEQUENT ENCOUNTER: ICD-10-CM

## 2022-08-01 PROCEDURE — 99214 OFFICE O/P EST MOD 30 MIN: CPT | Mod: S$GLB,,, | Performed by: INTERNAL MEDICINE

## 2022-08-01 PROCEDURE — 3288F PR FALLS RISK ASSESSMENT DOCUMENTED: ICD-10-PCS | Mod: CPTII,S$GLB,, | Performed by: INTERNAL MEDICINE

## 2022-08-01 PROCEDURE — 1101F PT FALLS ASSESS-DOCD LE1/YR: CPT | Mod: CPTII,S$GLB,, | Performed by: INTERNAL MEDICINE

## 2022-08-01 PROCEDURE — 1159F PR MEDICATION LIST DOCUMENTED IN MEDICAL RECORD: ICD-10-PCS | Mod: CPTII,S$GLB,, | Performed by: INTERNAL MEDICINE

## 2022-08-01 PROCEDURE — 1101F PR PT FALLS ASSESS DOC 0-1 FALLS W/OUT INJ PAST YR: ICD-10-PCS | Mod: CPTII,S$GLB,, | Performed by: INTERNAL MEDICINE

## 2022-08-01 PROCEDURE — 3074F PR MOST RECENT SYSTOLIC BLOOD PRESSURE < 130 MM HG: ICD-10-PCS | Mod: CPTII,S$GLB,, | Performed by: INTERNAL MEDICINE

## 2022-08-01 PROCEDURE — 1125F AMNT PAIN NOTED PAIN PRSNT: CPT | Mod: CPTII,S$GLB,, | Performed by: INTERNAL MEDICINE

## 2022-08-01 PROCEDURE — 1125F PR PAIN SEVERITY QUANTIFIED, PAIN PRESENT: ICD-10-PCS | Mod: CPTII,S$GLB,, | Performed by: INTERNAL MEDICINE

## 2022-08-01 PROCEDURE — 3078F DIAST BP <80 MM HG: CPT | Mod: CPTII,S$GLB,, | Performed by: INTERNAL MEDICINE

## 2022-08-01 PROCEDURE — 99999 PR PBB SHADOW E&M-EST. PATIENT-LVL III: ICD-10-PCS | Mod: PBBFAC,,, | Performed by: INTERNAL MEDICINE

## 2022-08-01 PROCEDURE — 1159F MED LIST DOCD IN RCRD: CPT | Mod: CPTII,S$GLB,, | Performed by: INTERNAL MEDICINE

## 2022-08-01 PROCEDURE — 3288F FALL RISK ASSESSMENT DOCD: CPT | Mod: CPTII,S$GLB,, | Performed by: INTERNAL MEDICINE

## 2022-08-01 PROCEDURE — 3078F PR MOST RECENT DIASTOLIC BLOOD PRESSURE < 80 MM HG: ICD-10-PCS | Mod: CPTII,S$GLB,, | Performed by: INTERNAL MEDICINE

## 2022-08-01 PROCEDURE — 3074F SYST BP LT 130 MM HG: CPT | Mod: CPTII,S$GLB,, | Performed by: INTERNAL MEDICINE

## 2022-08-01 PROCEDURE — 99999 PR PBB SHADOW E&M-EST. PATIENT-LVL III: CPT | Mod: PBBFAC,,, | Performed by: INTERNAL MEDICINE

## 2022-08-01 PROCEDURE — 99214 PR OFFICE/OUTPT VISIT, EST, LEVL IV, 30-39 MIN: ICD-10-PCS | Mod: S$GLB,,, | Performed by: INTERNAL MEDICINE

## 2022-08-02 NOTE — PROGRESS NOTES
"CHIEF COMPLAINT: ER follow up    HPI: This is a 81 year old woman from [place] who presents for ER follow up    She fell one night in the bathroom. Staff was present to assist  Her. She still fell and hit her head on her walker and the floor. She lacerated the left temopral area that was glued in the ED.    No loss of consciousness. She has slight tenderness at the laceration site. No other headaches. She has an excoriation on the left elbow with slight tenderness in the left elbow.  NO other pain.    No dizziness, nausea, vomiting, constipation, diarrhea. She now has a bedside commode and uses the bedside commode with assistance at night.         PAST MEDICAL HISTORY:   1. Mild mentally handicapped.   2. Schizophrenia.   3. A thyroid nodule, status post fine needle aspirate on 05/15/2003 , 2003 and 2006 (negative). Ultrasound in  was stable from   4. History of chest pain with a negative stress echocardiogram in 2003.   5. Osteopenia on bone mineral density 2004, improved in bone density . No treatmetn 2015  6. Breast cancer diagnosed 2020 - ER+, WY+, Her2- IDC of the left breast s/p left breast lumpectomy on 20. Negative margins. Adjuvant XRT was deferred. Patient started adjuvant endocrine therapy in 2020.     PAST SURGICAL HISTORY: Tonsillectomy and status post ORIF of the left leg.    MEDICATIONS: updated on epic    No known drug allergies.     SOCIAL HISTORY: No alcohol or tobacco. Resident of [employer].    FAMILY HISTORY: Her father  in his late 30s of a heart attack. Her mother  of Alzheimer's. A brother  in an explosion.         PHYSICAL EXAM:      /60 (BP Location: Left arm, Patient Position: Sitting)   Pulse 82   Ht 5' 4" (1.626 m)   Wt 63.8 kg (140 lb 10.5 oz)   SpO2 99%   BMI 24.14 kg/m²     GENERAL: She is alert, oriented, in no apparent distress.   Affect within normal limits. Conjunctivae anicteric. Pupils equal, " round, and reactive   to light. EOMI. Tympanic membranes clear. Oropharynx clear.   NECK: Supple. No cervical lymphadenopathy. Left lobe of the thyroid was enlarged.. No JVD.   RESPIRATORY: Effort normal.   LUNGS: Clear to auscultation.   HEART: Regular rate and rhythm without murmurs, gallops, or rubs. No lower extremity edema.   Laceration in scalp in left temporal area without erythema or warmth  Excoriation and bruising at left elbow without erythema or warmth    ER note reviewed    ASSESSMENT AND PLAN:   1. Fall with laceration to the head - healing.  She may wash her hair  2. Thyroid nodule - us thyroid  3. Lung nodule - Ct chest  4. Gait abnormality - has bedside commode now.   5. Mild mentally handicapped, resident of [employer]. A 90L Form filled out.   6. Schizophrenia, doing well   7. Osteoporosis :BMD 11/20 - stable. Back on alendronate since 2017.   8. Breast cancer -  on anastrazole     Screening. [facility] 7/19/21  She had a normal colonscopy May 2009  I will see Nettie dobbs in 1 year, sooner if problems arise   I see her every 2 weeks at [facility]

## 2022-08-18 ENCOUNTER — HOSPITAL ENCOUNTER (OUTPATIENT)
Dept: RADIOLOGY | Facility: HOSPITAL | Age: 82
Discharge: HOME | End: 2022-08-18
Attending: INTERNAL MEDICINE
Payer: MEDICARE

## 2022-08-18 DIAGNOSIS — E04.1 THYROID NODULE: ICD-10-CM

## 2022-08-18 DIAGNOSIS — R91.1 SOLITARY PULMONARY NODULE: ICD-10-CM

## 2022-08-18 PROCEDURE — 71250 CT THORAX DX C-: CPT | Mod: TC

## 2022-08-18 PROCEDURE — 71250 CT THORAX DX C-: CPT | Mod: 26,,, | Performed by: RADIOLOGY

## 2022-08-18 PROCEDURE — 76536 US EXAM OF HEAD AND NECK: CPT | Mod: 26,,, | Performed by: RADIOLOGY

## 2022-08-18 PROCEDURE — 76536 US EXAM OF HEAD AND NECK: CPT | Mod: TC

## 2022-08-18 PROCEDURE — 71250 CT CHEST WITHOUT CONTRAST: ICD-10-PCS | Mod: 26,,, | Performed by: RADIOLOGY

## 2022-08-18 PROCEDURE — 76536 US SOFT TISSUE HEAD NECK THYROID: ICD-10-PCS | Mod: 26,,, | Performed by: RADIOLOGY

## 2022-10-25 ENCOUNTER — IMMUNIZATION (OUTPATIENT)
Dept: INTERNAL MEDICINE | Facility: CLINIC | Age: 82
End: 2022-10-25
Payer: MEDICARE

## 2022-10-25 PROCEDURE — G0008 ADMIN INFLUENZA VIRUS VAC: HCPCS | Mod: S$GLB,,, | Performed by: INTERNAL MEDICINE

## 2022-10-25 PROCEDURE — 90694 FLU VACCINE - QUADRIVALENT - ADJUVANTED: ICD-10-PCS | Mod: S$GLB,,, | Performed by: INTERNAL MEDICINE

## 2022-10-25 PROCEDURE — G0008 FLU VACCINE - QUADRIVALENT - ADJUVANTED: ICD-10-PCS | Mod: S$GLB,,, | Performed by: INTERNAL MEDICINE

## 2022-10-25 PROCEDURE — 90694 VACC AIIV4 NO PRSRV 0.5ML IM: CPT | Mod: S$GLB,,, | Performed by: INTERNAL MEDICINE

## 2022-11-03 ENCOUNTER — HOSPITAL ENCOUNTER (OUTPATIENT)
Dept: RADIOLOGY | Facility: CLINIC | Age: 82
Discharge: HOME | End: 2022-11-03
Attending: INTERNAL MEDICINE
Payer: MEDICARE

## 2022-11-03 DIAGNOSIS — M81.0 OSTEOPOROSIS, UNSPECIFIED OSTEOPOROSIS TYPE, UNSPECIFIED PATHOLOGICAL FRACTURE PRESENCE: ICD-10-CM

## 2022-11-03 PROCEDURE — 77080 DXA BONE DENSITY AXIAL: CPT | Mod: 26,,, | Performed by: INTERNAL MEDICINE

## 2022-11-03 PROCEDURE — 77080 DXA BONE DENSITY AXIAL: CPT | Mod: TC

## 2022-11-03 PROCEDURE — 77080 DEXA BONE DENSITY SPINE HIP: ICD-10-PCS | Mod: 26,,, | Performed by: INTERNAL MEDICINE

## 2022-11-14 ENCOUNTER — TELEPHONE (OUTPATIENT)
Dept: INTERNAL MEDICINE | Facility: CLINIC | Age: 82
End: 2022-11-14
Payer: MEDICARE

## 2022-11-14 DIAGNOSIS — R29.898 ARM WEAKNESS: ICD-10-CM

## 2022-11-14 DIAGNOSIS — R29.898 WEAKNESS OF LOWER EXTREMITY, UNSPECIFIED LATERALITY: Primary | ICD-10-CM

## 2022-11-14 NOTE — TELEPHONE ENCOUNTER
Pt had a fall at [place] last night. No apparent injury. Rollator was malfunctioning.  Got a new Rollator for her today.  Gait is still unsteady. Vital signs are stable. No apparent injury upon assessment.    Will start [facility] physical therapy and occupational therapy for leg weakness and upper body weakness which likely led to the fall.     Please fax this note to [facility] health

## 2022-11-16 DIAGNOSIS — M79.602 LEFT ARM PAIN: Primary | ICD-10-CM

## 2022-11-17 ENCOUNTER — HOSPITAL ENCOUNTER (OUTPATIENT)
Dept: RADIOLOGY | Facility: HOSPITAL | Age: 82
Discharge: HOME | DRG: 563 | End: 2022-11-17
Attending: INTERNAL MEDICINE
Payer: MEDICARE

## 2022-11-17 DIAGNOSIS — M79.602 LEFT ARM PAIN: ICD-10-CM

## 2022-11-17 PROCEDURE — 73030 XR SHOULDER TRAUMA 3 VIEW BILATERAL: ICD-10-PCS | Mod: 26,50,, | Performed by: RADIOLOGY

## 2022-11-17 PROCEDURE — 73130 X-RAY EXAM OF HAND: CPT | Mod: 26,LT,, | Performed by: RADIOLOGY

## 2022-11-17 PROCEDURE — 73130 XR HAND COMPLETE 3 VIEW LEFT: ICD-10-PCS | Mod: 26,LT,, | Performed by: RADIOLOGY

## 2022-11-17 PROCEDURE — 73070 X-RAY EXAM OF ELBOW: CPT | Mod: 26,LT,, | Performed by: RADIOLOGY

## 2022-11-17 PROCEDURE — 73110 X-RAY EXAM OF WRIST: CPT | Mod: TC,LT

## 2022-11-17 PROCEDURE — 73110 XR WRIST COMPLETE 3 VIEWS LEFT: ICD-10-PCS | Mod: 26,LT,, | Performed by: RADIOLOGY

## 2022-11-17 PROCEDURE — 73110 X-RAY EXAM OF WRIST: CPT | Mod: 26,LT,, | Performed by: RADIOLOGY

## 2022-11-17 PROCEDURE — 73130 X-RAY EXAM OF HAND: CPT | Mod: TC,LT

## 2022-11-17 PROCEDURE — 73070 X-RAY EXAM OF ELBOW: CPT | Mod: TC,LT

## 2022-11-17 PROCEDURE — 73030 X-RAY EXAM OF SHOULDER: CPT | Mod: TC,50

## 2022-11-17 PROCEDURE — 73070 XR ELBOW 2 VIEWS LEFT: ICD-10-PCS | Mod: 26,LT,, | Performed by: RADIOLOGY

## 2022-11-17 PROCEDURE — 73030 X-RAY EXAM OF SHOULDER: CPT | Mod: 26,50,, | Performed by: RADIOLOGY

## 2022-11-18 ENCOUNTER — HOSPITAL ENCOUNTER (INPATIENT)
Facility: HOSPITAL | Age: 82
LOS: 3 days | Discharge: HOME HEALTH | DRG: 563 | End: 2022-11-21
Attending: EMERGENCY MEDICINE | Admitting: STUDENT IN AN ORGANIZED HEALTH CARE EDUCATION/TRAINING PROGRAM
Payer: MEDICARE

## 2022-11-18 DIAGNOSIS — D64.9 ANEMIA, UNSPECIFIED TYPE: ICD-10-CM

## 2022-11-18 DIAGNOSIS — S43.005A DISLOCATION OF LEFT SHOULDER JOINT, INITIAL ENCOUNTER: Primary | ICD-10-CM

## 2022-11-18 DIAGNOSIS — S22.42XA CLOSED FRACTURE OF MULTIPLE RIBS OF LEFT SIDE, INITIAL ENCOUNTER: ICD-10-CM

## 2022-11-18 DIAGNOSIS — S43.005A: ICD-10-CM

## 2022-11-18 DIAGNOSIS — J94.8 HYDROPNEUMOTHORAX: ICD-10-CM

## 2022-11-18 DIAGNOSIS — F41.9 ANXIETY: ICD-10-CM

## 2022-11-18 DIAGNOSIS — F39 MOOD DISORDER: ICD-10-CM
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
## 2022-11-18 DIAGNOSIS — E04.1 THYROID NODULE: Primary | ICD-10-CM
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
## 2022-11-18 PROBLEM — S43.015A: Status: ACTIVE | Noted: 2022-11-18

## 2022-11-18 LAB
ALBUMIN SERPL BCP-MCNC: 2.7 G/DL (ref 3.5–5.2)
ALP SERPL-CCNC: 62 U/L (ref 55–135)
ALT SERPL W/O P-5'-P-CCNC: 39 U/L (ref 10–44)
ANION GAP SERPL CALC-SCNC: 8 MMOL/L (ref 8–16)
AST SERPL-CCNC: 29 U/L (ref 10–40)
BASOPHILS # BLD AUTO: 0.03 K/UL (ref 0–0.2)
BASOPHILS NFR BLD: 0.4 % (ref 0–1.9)
BILIRUB SERPL-MCNC: 0.5 MG/DL (ref 0.1–1)
BUN SERPL-MCNC: 34 MG/DL (ref 8–23)
CALCIUM SERPL-MCNC: 9.7 MG/DL (ref 8.7–10.5)
CHLORIDE SERPL-SCNC: 108 MMOL/L (ref 95–110)
CO2 SERPL-SCNC: 29 MMOL/L (ref 23–29)
CREAT SERPL-MCNC: 0.9 MG/DL (ref 0.5–1.4)
DIFFERENTIAL METHOD: ABNORMAL
EOSINOPHIL # BLD AUTO: 0.2 K/UL (ref 0–0.5)
EOSINOPHIL NFR BLD: 2.2 % (ref 0–8)
ERYTHROCYTE [DISTWIDTH] IN BLOOD BY AUTOMATED COUNT: 12.8 % (ref 11.5–14.5)
EST. GFR  (NO RACE VARIABLE): >60 ML/MIN/1.73 M^2
GLUCOSE SERPL-MCNC: 95 MG/DL (ref 70–110)
HCT VFR BLD AUTO: 27.4 % (ref 37–48.5)
HGB BLD-MCNC: 8.6 G/DL (ref 12–16)
IMM GRANULOCYTES # BLD AUTO: 0.04 K/UL (ref 0–0.04)
IMM GRANULOCYTES NFR BLD AUTO: 0.5 % (ref 0–0.5)
LIPASE SERPL-CCNC: 24 U/L (ref 4–60)
LYMPHOCYTES # BLD AUTO: 1.5 K/UL (ref 1–4.8)
LYMPHOCYTES NFR BLD: 19.8 % (ref 18–48)
MCH RBC QN AUTO: 32.6 PG (ref 27–31)
MCHC RBC AUTO-ENTMCNC: 31.4 G/DL (ref 32–36)
MCV RBC AUTO: 104 FL (ref 82–98)
MONOCYTES # BLD AUTO: 0.6 K/UL (ref 0.3–1)
MONOCYTES NFR BLD: 8.4 % (ref 4–15)
NEUTROPHILS # BLD AUTO: 5.2 K/UL (ref 1.8–7.7)
NEUTROPHILS NFR BLD: 68.7 % (ref 38–73)
NRBC BLD-RTO: 0 /100 WBC
PLATELET # BLD AUTO: 312 K/UL (ref 150–450)
PMV BLD AUTO: 12.6 FL (ref 9.2–12.9)
POTASSIUM SERPL-SCNC: 4.2 MMOL/L (ref 3.5–5.1)
PROT SERPL-MCNC: 6.7 G/DL (ref 6–8.4)
RBC # BLD AUTO: 2.64 M/UL (ref 4–5.4)
SODIUM SERPL-SCNC: 145 MMOL/L (ref 136–145)
WBC # BLD AUTO: 7.62 K/UL (ref 3.9–12.7)

## 2022-11-18 PROCEDURE — 85025 COMPLETE CBC W/AUTO DIFF WBC: CPT

## 2022-11-18 PROCEDURE — 99223 1ST HOSP IP/OBS HIGH 75: CPT | Mod: AI,,, | Performed by: STUDENT IN AN ORGANIZED HEALTH CARE EDUCATION/TRAINING PROGRAM

## 2022-11-18 PROCEDURE — 63600175 PHARM REV CODE 636 W HCPCS: Performed by: STUDENT IN AN ORGANIZED HEALTH CARE EDUCATION/TRAINING PROGRAM

## 2022-11-18 PROCEDURE — 99291 CRITICAL CARE FIRST HOUR: CPT | Mod: 25,,, | Performed by: EMERGENCY MEDICINE

## 2022-11-18 PROCEDURE — 25500020 PHARM REV CODE 255: Performed by: EMERGENCY MEDICINE

## 2022-11-18 PROCEDURE — 99153 MOD SED SAME PHYS/QHP EA: CPT

## 2022-11-18 PROCEDURE — 80053 COMPREHEN METABOLIC PANEL: CPT

## 2022-11-18 PROCEDURE — 99223 PR INITIAL HOSPITAL CARE,LEVL III: ICD-10-PCS | Mod: AI,,, | Performed by: STUDENT IN AN ORGANIZED HEALTH CARE EDUCATION/TRAINING PROGRAM

## 2022-11-18 PROCEDURE — 27000221 HC OXYGEN, UP TO 24 HOURS

## 2022-11-18 PROCEDURE — 23650 CLTX SHO DSLC W/MNPJ WO ANES: CPT | Mod: LT,,, | Performed by: EMERGENCY MEDICINE

## 2022-11-18 PROCEDURE — 99900035 HC TECH TIME PER 15 MIN (STAT)

## 2022-11-18 PROCEDURE — 25000242 PHARM REV CODE 250 ALT 637 W/ HCPCS: Performed by: STUDENT IN AN ORGANIZED HEALTH CARE EDUCATION/TRAINING PROGRAM

## 2022-11-18 PROCEDURE — 94761 N-INVAS EAR/PLS OXIMETRY MLT: CPT

## 2022-11-18 PROCEDURE — 23650 PR CLOSED RX SHLDR DISLOCATION: ICD-10-PCS | Mod: LT,,, | Performed by: EMERGENCY MEDICINE

## 2022-11-18 PROCEDURE — 12000002 HC ACUTE/MED SURGE SEMI-PRIVATE ROOM

## 2022-11-18 PROCEDURE — 99291 CRITICAL CARE FIRST HOUR: CPT | Mod: 25

## 2022-11-18 PROCEDURE — 99152 MOD SED SAME PHYS/QHP 5/>YRS: CPT | Mod: ,,, | Performed by: EMERGENCY MEDICINE

## 2022-11-18 PROCEDURE — 99291 PR CRITICAL CARE, E/M 30-74 MINUTES: ICD-10-PCS | Mod: 25,,, | Performed by: EMERGENCY MEDICINE

## 2022-11-18 PROCEDURE — 94640 AIRWAY INHALATION TREATMENT: CPT

## 2022-11-18 PROCEDURE — 63600175 PHARM REV CODE 636 W HCPCS: Performed by: EMERGENCY MEDICINE

## 2022-11-18 PROCEDURE — 83690 ASSAY OF LIPASE: CPT

## 2022-11-18 PROCEDURE — 99152 PR MOD CONSCIOUS SEDATION, SAME PHYS, 5+ YRS, FIRST 15 MIN: ICD-10-PCS | Mod: ,,, | Performed by: EMERGENCY MEDICINE

## 2022-11-18 PROCEDURE — 23650 CLTX SHO DSLC W/MNPJ WO ANES: CPT | Mod: LT

## 2022-11-18 PROCEDURE — 99152 MOD SED SAME PHYS/QHP 5/>YRS: CPT

## 2022-11-18 RX ORDER — DOCUSATE SODIUM 100 MG/1
100 CAPSULE, LIQUID FILLED ORAL DAILY
Status: DISCONTINUED | OUTPATIENT
Start: 2022-11-19 | End: 2022-11-21 | Stop reason: HOSPADM

## 2022-11-18 RX ORDER — ACETAMINOPHEN 325 MG/1
650 TABLET ORAL EVERY 6 HOURS
Status: DISCONTINUED | OUTPATIENT
Start: 2022-11-19 | End: 2022-11-21 | Stop reason: HOSPADM

## 2022-11-18 RX ORDER — LEVALBUTEROL INHALATION SOLUTION 0.63 MG/3ML
0.63 SOLUTION RESPIRATORY (INHALATION) EVERY 8 HOURS
Status: DISCONTINUED | OUTPATIENT
Start: 2022-11-18 | End: 2022-11-19

## 2022-11-18 RX ORDER — OXYCODONE HYDROCHLORIDE 10 MG/1
10 TABLET ORAL EVERY 4 HOURS PRN
Status: DISCONTINUED | OUTPATIENT
Start: 2022-11-18 | End: 2022-11-21 | Stop reason: HOSPADM

## 2022-11-18 RX ORDER — SODIUM CHLORIDE, SODIUM LACTATE, POTASSIUM CHLORIDE, CALCIUM CHLORIDE 600; 310; 30; 20 MG/100ML; MG/100ML; MG/100ML; MG/100ML
INJECTION, SOLUTION INTRAVENOUS CONTINUOUS
Status: DISCONTINUED | OUTPATIENT
Start: 2022-11-18 | End: 2022-11-21

## 2022-11-18 RX ORDER — HYDROMORPHONE HYDROCHLORIDE 1 MG/ML
0.5 INJECTION, SOLUTION INTRAMUSCULAR; INTRAVENOUS; SUBCUTANEOUS EVERY 6 HOURS PRN
Status: DISCONTINUED | OUTPATIENT
Start: 2022-11-18 | End: 2022-11-21 | Stop reason: HOSPADM

## 2022-11-18 RX ORDER — ONDANSETRON 8 MG/1
8 TABLET, ORALLY DISINTEGRATING ORAL EVERY 8 HOURS PRN
Status: DISCONTINUED | OUTPATIENT
Start: 2022-11-18 | End: 2022-11-21 | Stop reason: HOSPADM

## 2022-11-18 RX ORDER — ENOXAPARIN SODIUM 100 MG/ML
30 INJECTION SUBCUTANEOUS EVERY 12 HOURS
Status: DISCONTINUED | OUTPATIENT
Start: 2022-11-18 | End: 2022-11-21 | Stop reason: HOSPADM

## 2022-11-18 RX ORDER — TALC
6 POWDER (GRAM) TOPICAL NIGHTLY PRN
Status: DISCONTINUED | OUTPATIENT
Start: 2022-11-18 | End: 2022-11-21 | Stop reason: HOSPADM

## 2022-11-18 RX ORDER — PROPOFOL 10 MG/ML
1 VIAL (ML) INTRAVENOUS
Status: COMPLETED | OUTPATIENT
Start: 2022-11-18 | End: 2022-11-18

## 2022-11-18 RX ORDER — OXYCODONE HYDROCHLORIDE 5 MG/1
5 TABLET ORAL EVERY 4 HOURS PRN
Status: DISCONTINUED | OUTPATIENT
Start: 2022-11-18 | End: 2022-11-21 | Stop reason: HOSPADM

## 2022-11-18 RX ORDER — PROPOFOL 10 MG/ML
50 VIAL (ML) INTRAVENOUS ONCE
Status: COMPLETED | OUTPATIENT
Start: 2022-11-18 | End: 2022-11-18

## 2022-11-18 RX ORDER — KETOROLAC TROMETHAMINE 30 MG/ML
15 INJECTION, SOLUTION INTRAMUSCULAR; INTRAVENOUS EVERY 6 HOURS
Status: DISCONTINUED | OUTPATIENT
Start: 2022-11-19 | End: 2022-11-21 | Stop reason: HOSPADM

## 2022-11-18 RX ORDER — LEVALBUTEROL INHALATION SOLUTION 0.63 MG/3ML
0.63 SOLUTION RESPIRATORY (INHALATION) EVERY 8 HOURS
Status: DISCONTINUED | OUTPATIENT
Start: 2022-11-18 | End: 2022-11-18

## 2022-11-18 RX ORDER — ARIPIPRAZOLE 2 MG/1
2 TABLET ORAL NIGHTLY
Status: DISCONTINUED | OUTPATIENT
Start: 2022-11-18 | End: 2022-11-21 | Stop reason: HOSPADM

## 2022-11-18 RX ORDER — ACETAMINOPHEN 325 MG/1
650 TABLET ORAL EVERY 8 HOURS PRN
Status: DISCONTINUED | OUTPATIENT
Start: 2022-11-18 | End: 2022-11-21 | Stop reason: HOSPADM

## 2022-11-18 RX ORDER — LIDOCAINE HYDROCHLORIDE 10 MG/ML
10 INJECTION, SOLUTION EPIDURAL; INFILTRATION; INTRACAUDAL; PERINEURAL
Status: DISPENSED | OUTPATIENT
Start: 2022-11-18 | End: 2022-11-19

## 2022-11-18 RX ORDER — SODIUM CHLORIDE 0.9 % (FLUSH) 0.9 %
10 SYRINGE (ML) INJECTION
Status: DISCONTINUED | OUTPATIENT
Start: 2022-11-18 | End: 2022-11-21 | Stop reason: HOSPADM

## 2022-11-18 RX ORDER — IPRATROPIUM BROMIDE AND ALBUTEROL SULFATE 2.5; .5 MG/3ML; MG/3ML
3 SOLUTION RESPIRATORY (INHALATION)
Status: DISCONTINUED | OUTPATIENT
Start: 2022-11-18 | End: 2022-11-18

## 2022-11-18 RX ORDER — LIDOCAINE HYDROCHLORIDE 10 MG/ML
1 INJECTION, SOLUTION EPIDURAL; INFILTRATION; INTRACAUDAL; PERINEURAL ONCE AS NEEDED
Status: DISCONTINUED | OUTPATIENT
Start: 2022-11-18 | End: 2022-11-21 | Stop reason: HOSPADM

## 2022-11-18 RX ORDER — FLUOXETINE HYDROCHLORIDE 20 MG/1
20 CAPSULE ORAL EVERY MORNING
Status: DISCONTINUED | OUTPATIENT
Start: 2022-11-19 | End: 2022-11-21 | Stop reason: HOSPADM

## 2022-11-18 RX ADMIN — LEVALBUTEROL HYDROCHLORIDE 0.63 MG: 0.63 SOLUTION RESPIRATORY (INHALATION) at 09:11

## 2022-11-18 RX ADMIN — IOHEXOL 75 ML: 350 INJECTION, SOLUTION INTRAVENOUS at 02:11

## 2022-11-18 RX ADMIN — PROPOFOL 68 MG: 10 INJECTION, EMULSION INTRAVENOUS at 05:11

## 2022-11-18 RX ADMIN — PROPOFOL 68 MG: 10 INJECTION, EMULSION INTRAVENOUS at 08:11

## 2022-11-18 RX ADMIN — PROPOFOL 50 MG: 10 INJECTION, EMULSION INTRAVENOUS at 08:11

## 2022-11-18 NOTE — PROVIDER PROGRESS NOTES - EMERGENCY DEPT.
"ED Resident HAND-OFF NOTE:  Nettie Villasenor is a 82 y.o. female who presented to the ED on 11/18/2022, patient C/O fall. I assumed care of patient from off-going ED physician team patient pending admission, left shoulder reduction.    On my evaluation, Nettie Villasenor appears well, hemodynamically stable and in NAD. Thus far, Nettie Villasenor has received:  Medications   iohexoL (OMNIPAQUE 350) injection 75 mL (75 mLs Intravenous Given 11/18/22 1459)       BP (!) 159/91   Pulse 88   Temp 97.2 °F (36.2 °C) (Oral)   Resp 18   Ht 5' 2" (1.575 m)   Wt 68 kg (150 lb)   SpO2 99%   BMI 27.44 kg/m²         Disposition: I anticipate patient will admission for polytrauma  ______________________  Negar Rowan MD   Emergency Medicine Resident      UPDATE:   CT chest showing 2 rib fractures, anterior shoulder dislocation, hydropneumothorax.  Consulted general surgery for poly trauma.  General surgery to admit to their service for further management.    Patient satting 100% on room air.  Patient placed on 15 liters/minute non-rebreather for hydropneumothorax.  Chest tube not indicated at this time.    Procedural sedation and reduction of the left shoulder conducted as noted below in procedure note.  Patient tolerated the procedure well but postreduction x-ray showing persistent dislocation of the shoulder.  Consulted Orthopedic surgery, they agreed to see the patient.  Reduction unsuccessful by Orthopedic surgery, patient likely to go to surgery for reduction.  Repeat procedural sedation completed as noted below in procedure note.    Patient admitted to general surgery service for polytrauma.  Patient is hemodynamically stable and appropriate for transfer to the floor at this time.      :  Fall    Procedural Sedation        Date/Time: 11/18/2022 5:34 PM  Performed by: Negar Rowan MD  Authorized by: Erika Souza MD   ASA Class: Class 2 - Mild Illness without functional impairment.  Mallampati Score: Class 3 - " Visualization of the soft palate and base of the uvula.   NPO STATUS:  Date/Time of last solid: 2022 9:00 AM  Contents of last solid: Angelo, eggs    Equipment: on cardiac monitor., on BP monitor., on CO2 monitor., airway equipment available., suction available. and on supplemental oxygen.     Sedation type: moderate (conscious) sedation    Sedatives: propofol  Sedation start date/time: 2022 5:50 PM  Sedation end date/time: 2022 6:06 PM  Total Sedation Time (min): 10  Vitals: Vital signs were monitored during sedation.  Complications: No complications.   Patient/Family history of anesthesia or sedation complications: No    Orthopedic Injury    Date/Time: 2022 5:50 PM  Performed by: Negar Rowan MD  Authorized by: Sue Lennon MD     Location procedure was performed:  [facility] EM[facility]  Consent Done?:  Yes  Universal Protocol:     Verbal consent obtained?: Yes      Written consent obtained?: Yes      Risks and benefits: Risks, benefits and alternatives were discussed      Consent given by:  Power of     Patient states understanding of procedure being performed: Yes      Required items: Required blood products, implants, devices and special equipment avialable      Patient identity confirmed:  , MRN, name and verbally with patient    Time Out: Immediately prior to the procedure a time out was called    Injury:     Injury location:  Upper arm    Location details:  Left upper arm    Injury type:  Dislocation      Pre-procedure assessment:     Neurovascular status: Neurovascularly intact      Distal perfusion: normal      Neurological function: normal      Range of motion: normal      Patient sedated?: Yes      ASA Class:  Class 2 - Mild Illness without functional impairment.    Mallampati Score:  Class 3 - Visualization of the soft palate and base of the uvula.  Date/Time of last solid:  2022 9:00 AM    Contents of Last Food Intake:  Angelo, eggs     Patient/Family history of anesthesia or sedation complications: No      Sedation:  Propofol    Sedation start:  11/18/2022 5:50 PM    Sedation end:  11/18/2022 6:06 PM    Vital signs: Vital signs monitored during sedation        Selections made in this section will also lock the Injury type section above.:     Manipulation performed?: Yes      Reduction successful?: No      Immobilization:  Sling  Post-procedure assessment:     Neurovascular status: Neurovascularly intact      Distal perfusion: normal      Neurological function: normal      Range of motion: normal      Patient tolerance:  Patient tolerated the procedure well with no immediate complications  Procedural Sedation        Date/Time: 11/18/2022 10:14 PM  Performed by: Negar Rowan MD  Authorized by: Patrick Murguia MD   ASA Class: Class 2 - Mild Illness without functional impairment.  Mallampati Score: Class 3 - Visualization of the soft palate and base of the uvula.     Equipment: on cardiac monitor., on BP monitor., suction available., on CO2 monitor. and on supplemental oxygen.     Sedatives: propofol  Sedation start date/time: 11/18/2022 8:40 PM  Sedation end date/time: 11/18/2022 9:08 PM  Vitals: Vital signs were monitored during sedation.  Complications: No complications.

## 2022-11-18 NOTE — SUBJECTIVE & OBJECTIVE
Current Facility-Administered Medications on File Prior to Encounter   Medication    0.9%  NaCl infusion    fentaNYL injection 25 mcg    midazolam (VERSED) 1 mg/mL injection 0.5 mg     Current Outpatient Medications on File Prior to Encounter   Medication Sig    acetaminophen (TYLENOL) 500 MG tablet Take 1,000 mg by mouth every evening.    alendronate (FOSAMAX) 70 MG tablet TAKE 1 TABLET BY MOUTH EVERY 7 DAYS    anastrozole (ARIMIDEX) 1 mg Tab Take 1 tablet (1 mg total) by mouth once daily.    ARIPiprazole (ABILIFY) 2 MG Tab TAKE 1 TABLET BY MOUTH EVERY EVENING at bedtime    aspirin (ECOTRIN) 81 MG EC tablet TAKE 1 TABLET BY MOUTH daily TO PREVENT CLOTS    aspirin 81 MG Chew Take 81 mg by mouth once daily.    calcium carbonate (OS-OTTONIEL) 500 mg calcium (1,250 mg) tablet Take 1 tablet by mouth 2 (two) times daily.    COVID-19 vacc,mRNA,Moderna,/PF (MODERNA COVID-19 VACCINE, EUA, IM) ADMINISTER 0.5ML IN THE MUSCLE AS DIRECTED    docusate sodium (COLACE) 100 MG capsule Take 100 mg by mouth once daily.    fexofenadine (ALLEGRA) 180 MG tablet Take 180 mg by mouth daily as needed.    FLUoxetine 20 MG capsule TAKE 1 CAPSULE BY MOUTH every morning    HYDROcodone-acetaminophen (NORCO) 5-325 mg per tablet Take 1 tablet by mouth every 6 (six) hours as needed for Pain.    lorazepam (ATIVAN) 0.5 MG tablet Take 0.5 mg by mouth every 12 (twelve) hours as needed.    multivitamin capsule Take 1 capsule by mouth once daily.    vitamin D 1000 units Tab Take 185 mg by mouth once daily.       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    Osteopenia 3/13/2013    Schizophrenia 3/13/2013    Thyroid nodule      Past Surgical History:   Procedure Laterality Date    BREAST BIOPSY Left     BREAST LUMPECTOMY      LUMPECTOMY,BREAST,WITH RADIOACTIVE SEED LOCALIZATION Left 09/04/2020     Procedure: LUMPECTOMY,BREAST,WITH RADIOACTIVE SEED LOCALIZATION reflector placed on 9/2/20;  Surgeon: Los Benites MD;  Location: [facility];  Service: General;  Laterality: Left;    ORIF left lower leg      TONSILLECTOMY       Family History       Problem Relation (Age of Onset)    Dementia Mother    Heart attack Father          Tobacco Use    Smoking status: Never    Smokeless tobacco: Never   Substance and Sexual Activity    Alcohol use: No    Drug use: No    Sexual activity: Never     Review of Systems   Constitutional:  Negative for chills and fever.   HENT:  Negative for trouble swallowing and voice change.    Eyes:  Negative for visual disturbance.   Respiratory:  Negative for cough, chest tightness, shortness of breath, wheezing and stridor.    Cardiovascular:  Negative for chest pain and palpitations.   Gastrointestinal:  Negative for abdominal distention, abdominal pain, nausea and vomiting.   Endocrine: Negative.    Genitourinary:  Negative for difficulty urinating and dysuria.   Musculoskeletal:  Positive for joint swelling.   Skin:  Positive for color change (ecchymosis to proximal L upper extremity).   Neurological:  Negative for syncope and light-headedness.   Psychiatric/Behavioral:  Negative for agitation.    Objective:     Vital Signs (Most Recent):  Temp: 97.2 °F (36.2 °C) (11/18/22 0940)  Pulse: 89 (11/18/22 1700)  Resp: 16 (11/18/22 1700)  BP: 109/67 (11/18/22 1700)  SpO2: 99 % (11/18/22 1700) Vital Signs (24h Range):  Temp:  [97.2 °F (36.2 °C)] 97.2 °F (36.2 °C)  Pulse:  [] 89  Resp:  [16-20] 16  SpO2:  [97 %-99 %] 99 %  BP: (109-159)/(57-91) 109/67     Weight: 68 kg (150 lb)  Body mass index is 27.44 kg/m².    Physical Exam  Vitals and nursing note reviewed.   Constitutional:       General: She is not in acute distress.     Appearance: Normal appearance.   HENT:      Head: Normocephalic and atraumatic.      Nose: Nose normal.      Mouth/Throat:      Mouth: Mucous membranes are dry.    Eyes:      Pupils: Pupils are equal, round, and reactive to light.   Cardiovascular:      Rate and Rhythm: Normal rate.      Pulses: Normal pulses.   Pulmonary:      Effort: Pulmonary effort is normal. No respiratory distress.      Breath sounds: No wheezing.      Comments: On non rebreather mask; L chest TTP; no paradoxical chest wall movements  Abdominal:      General: Abdomen is flat. There is no distension.      Palpations: Abdomen is soft.      Tenderness: There is no abdominal tenderness. There is no guarding or rebound.   Musculoskeletal:         General: Swelling, tenderness, deformity and signs of injury present.      Cervical back: Normal range of motion.      Comments: Left should with obvious dislocation; ecchymosis to LUE and soft tissue swelling    Skin:     General: Skin is warm.   Neurological:      Mental Status: She is alert. Mental status is at baseline.   Psychiatric:         Mood and Affect: Mood normal.         Behavior: Behavior normal.       Significant Labs:  I have reviewed all pertinent lab results within the past 24 hours.  CBC:   Recent Labs   Lab 11/18/22  1236   WBC 7.62   RBC 2.64*   HGB 8.6*   HCT 27.4*      *   MCH 32.6*   MCHC 31.4*     CMP:   Recent Labs   Lab 11/18/22  1236   GLU 95   CALCIUM 9.7   ALBUMIN 2.7*   PROT 6.7      K 4.2   CO2 29      BUN 34*   CREATININE 0.9   ALKPHOS 62   ALT 39   AST 29   BILITOT 0.5       Significant Diagnostics:  I have reviewed all pertinent imaging results/findings within the past 24 hours.    X-Ray Humerus 2 View Left  Anterior shoulder dislocation identified.  No definite fracture or dislocation involving the humerus.       X-Ray Elbow 2 Views Left  Visualized osseous structures appear unremarkable, with no evidence of recent fracture, lytic destructive process, or other significant abnormality identified.  No demonstrable joint effusion.  A tiny olecranon spur is incidentally observed.       X-Ray Elbow Complete  Left  No fracture or dislocation.  No bone destruction identified.       X-Ray Forearm Left  DJD.  No fracture or dislocation.  No bone destruction identified       X-Ray Wrist Complete Left   DJD most significant the 1st carpometacarpal articulation.  No fracture or dislocation.  No bone destruction identified.  Chondrocalcinosis.     X-Ray Wrist Complete 3 views Left  DJD mainly affecting the 1st carpometacarpal articulation.  No definite acute fracture or dislocation.  No bone destruction identified.  Chondrocalcinosis.     X-Ray Hand 3 View Left  DJD involving the D IP joint but most significantly the 1st carpometacarpal articulation.  No acute fracture or dislocation.  No bone destruction identified.  Chondrocalcinosis.  Soft tissue swelling noted at the dorsum of the hand.     X-Ray Hand 3 View Left  Diffuse soft tissue swelling.  Correlate clinically. No acute osseous abnormality.  DJD, more severe at 1st CMC joint of wrist.  Chondrocalcinosis also noted at the wrist.     CT Head Without Contrast  No evidence of acute intracranial hemorrhage, allowing for the limitations patient motion artifact.    CT Cervical Spine Without Contrast  1. No fracture. 2. Multilevel degenerative changes the cervical spine detailed above. 3. Left hydropneumothorax, see several report for interpretation of chest CT findings. 4. Large mass which appears to arise from the left thyroid demonstrating substantial mass effect upon the trachea.         CT Chest Abdomen Pelvis With Contrast (xpd)   1. Acute left-sided hydropneumothorax, likely secondary to interval acute appearing displaced fracture of the anterior left 3rd rib, which was likely impacted from new acute left anterior glenohumeral dislocation.  There is also minimally displaced acute fracture of the anterior left 2nd rib.  There is associated moderate left shoulder joint effusion and soft tissue swelling of the partially imaged surrounding left shoulder soft tissues.  No  findings to suggest tension pneumothorax, and no right-sided pneumothorax or pneumomediastinum. 2. No evidence of solid organ injury or acute displaced fracture seen elsewhere within the chest, abdomen or pelvis. 3. Multiple varying age rib fractures involving the right hemithorax which appear subacute/remote but new from 08/18/2022 study. 4. Markedly enlarged left thyroid nodule as described above, grossly stable.  There is continued mass effect on the larynx, trachea and esophagus with rightward displacement.  This is been present since at least 03/18/2003. 5. 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 6. Right renal nonobstructing nephrolithiasis.  There is mild to moderate left-sided hydronephrosis containing large staghorn type calculi within the mid to lower pole renal pelvis but no definite stone at the ureteropelvic junction or significant left ureteral dilatation, which could reflect sequela of a non radiodense stone or stricture at the UPJ.  Clinical correlation advised.  Further evaluation/follow-up as warranted. 7. Stable 1.2 cm hyperdense nodule arising from the left upper renal pole likely hemorrhagic or proteinaceous cyst. 8. Left adrenal 2.6 cm nodule which remains incompletely characterized.  Further evaluation with elective/nonemergent CT or MRI of the abdomen with adrenal mass protocol can be obtained as warranted. 9. Diverticulosis coli without acute diverticulitis. 10. Suspected uterine fibroids the largest of which appears partially calcified. 11. Several stable small right apical pulmonary nodules. 12. Grossly stable partially calcified soft tissue nodule within the left breast likely representing postsurgical change from prior lumpectomy. 13. Additional findings as above.     X-Ray Shoulder Trauma Left  Anterior dislocation at the left shoulder identified as before.  DJD.  Irregular glenoid identified similar to the previous study.  Several  calcifications also identified adjacent to the humeral head..  CT scan would be helpful for further more definite evaluation     X-Ray Shoulder Trauma 3 View Bilateral  DJD and rotator cuff pathology of the right shoulder. Anterior dislocation left shoulder.

## 2022-11-18 NOTE — ASSESSMENT & PLAN NOTE
Nettie Villasenor is a 82 year old female with a pmhx of mental disability, breast cancer, osteopenia, and schizophrenia who is presenting to [facility] [place] a fall at her nursing home, [facility]. imaging showing anterior dislocation of the left shoulder and surrounding soft tissue swelling as well as L 2-3rd rib fxs with associated L sided hydropneumothroax. No other fractures found on imaging (see above).     -- admit to general surgery   -- ED to reduce shoulder at bedside  -- MM pain control   -- prn nausea control   -- NPO for now, mIVF   -- aggressive pulmonary toilet   -- non rebreather

## 2022-11-18 NOTE — HPI
Nettie Villasenor  is a 82 y.o. female with a pmhx of mental disability, breast cancer, osteopenia, and schizophrenia who [place] [facility] [place] home, [facility]. On imaging, the patient was found to have a anterior dislocation of her left shoulder, as well as, a dislocated fracture of her L 3rd rib and a minimally displaced fracture of her L 2nd rib w/ associated L hydropneumothorax. In the ED, the patient is stable and in no acute distress. She was placed on a non rebreather and satting in the high 90s. Her vitals are stable. L shoulder to be reduced in the ED under conscious sedation. General surgery consulted to evaluate after poly trauma.

## 2022-11-18 NOTE — ED PROVIDER NOTES
Encounter Date: 11/18/2022       History     Chief Complaint   Patient presents with    Shoulder Injury     From [facility], with caretaker, sent to ed for eval of dislocated left shoulder. Left hand swelling and bruising     82-year-old female who presents to ED for chief complaint of left shoulder injury post fall on Wednesday.  Patient is from [facility] and caretaker is at bedside.  On Wednesday, patient was trying to use her walker to ambulate and fell onto her left side.  She states that her left shoulder and arm took most of the force of the fall.  Patient denies hitting her head and LOC is unknown.  She takes 81 mg of Aspirin daily.  Of note, patient had a shoulder x-ray on Thursday which should an anterior shoulder dislocation.  Patient has mild left shoulder pain and swelling of her left hand.  She denies headache, neck pain, nausea, and vomiting.    The history is provided by the patient and a caregiver. No  was used.   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    Osteopenia 3/13/2013    Schizophrenia 3/13/2013    Thyroid nodule      Past Surgical History:   Procedure Laterality Date    BREAST BIOPSY Left     BREAST LUMPECTOMY      LUMPECTOMY,BREAST,WITH RADIOACTIVE SEED LOCALIZATION Left 09/04/2020    Procedure: LUMPECTOMY,BREAST,WITH RADIOACTIVE SEED LOCALIZATION reflector placed on 9/2/20;  Surgeon: Los Benites MD;  Location: [facility];  Service: General;  Laterality: Left;    ORIF left lower leg      TONSILLECTOMY       Family History   Problem Relation Age of Onset    Dementia Mother     Heart attack Father     Melanoma Neg Hx     Breast cancer Neg Hx     Colon cancer Neg Hx     Ovarian cancer Neg Hx      Social History     Tobacco Use    Smoking status: Never    Smokeless tobacco: Never   Substance Use  Topics    Alcohol use: No    Drug use: No     Review of Systems   Constitutional:  Negative for chills and fever.   HENT:  Negative for congestion and rhinorrhea.    Respiratory:  Negative for shortness of breath.    Cardiovascular:  Negative for chest pain.   Gastrointestinal:  Negative for abdominal pain, nausea and vomiting.   Genitourinary:  Negative for difficulty urinating.   Musculoskeletal:  Positive for arthralgias and joint swelling. Negative for neck pain.   Skin:  Positive for wound.   Allergic/Immunologic: Negative for environmental allergies and food allergies.   Neurological:  Negative for headaches.     Physical Exam     Initial Vitals [11/18/22 0940]   BP Pulse Resp Temp SpO2   (!) 115/57 102 20 97.2 °F (36.2 °C) 99 %      MAP       --         Physical Exam    Nursing note and vitals reviewed.  Constitutional: She appears well-developed. No distress.   Patient is laying in bed in no apparent distress.   HENT:   Head: Normocephalic.   Mouth/Throat: Oropharynx is clear and moist.   Eyes: Conjunctivae are normal. No scleral icterus.   Neck:   Normal range of motion.  Cardiovascular:  Regular rhythm and normal heart sounds.           Patient is tachycardic.   Pulmonary/Chest: Breath sounds normal. No respiratory distress.   Bruising of the left breast.   Abdominal: Abdomen is soft. She exhibits no distension. There is no abdominal tenderness.   Old and new bruising noted on abdomen.  Large left abdominal bruise. There is no rebound and no guarding.   Musculoskeletal:         General: Edema present. No tenderness.      Cervical back: Normal range of motion.      Comments: Left upper extremity had edema and bruising.  Ecchymosis of the left shoulder.  Limited range of motion of the left upper extremity.     Neurological: She is alert.   Skin: Skin is warm. Capillary refill takes less than 2 seconds.   Psychiatric: She has a normal mood and affect.       ED Course   Procedures  Labs Reviewed   CBC W/ AUTO  DIFFERENTIAL - Abnormal; Notable for the following components:       Result Value    RBC 2.64 (*)     Hemoglobin 8.6 (*)     Hematocrit 27.4 (*)      (*)     MCH 32.6 (*)     MCHC 31.4 (*)     All other components within normal limits   COMPREHENSIVE METABOLIC PANEL - Abnormal; Notable for the following components:    BUN 34 (*)     Albumin 2.7 (*)     All other components within normal limits   LIPASE          Imaging Results              X-Ray Shoulder 1 View Left (Final result)  Result time 11/19/22 00:10:57      Final result by Logan Patel MD (11/19/22 00:10:57)                   Impression:      Status post reduction of the prior left shoulder dislocation      Electronically signed by: Logan Patel  Date:    11/19/2022  Time:    00:10               Narrative:    EXAMINATION:  XR SHOULDER 1 VIEW LEFT    CLINICAL HISTORY:  Post red;    TECHNIQUE:  Five views of the left shoulder.  23:17 hours    COMPARISON:  11/18/2022, 22:36 hours    FINDINGS:  The humeral head appears reduced.  The AC joint is intact.    Left hemithorax is clear.  No evidence of dislocation presently.                                        X-Ray Shoulder Trauma 3 view Left (Final result)  Result time 11/19/22 00:06:08      Final result by Logan Patel MD (11/19/22 00:06:08)                   Impression:      Persistent anterior left shoulder dislocation.  Follow-up recommended.    This report was flagged in Epic as abnormal.      Electronically signed by: Logan Patel  Date:    11/19/2022  Time:    00:06               Narrative:    EXAMINATION:  XR SHOULDER TRAUMA 3 VIEW LEFT    CLINICAL HISTORY:  Reduction;    TECHNIQUE:  Single view left shoulder    COMPARISON  11/18/2022    FINDINGS:  Persistent anterior left shoulder dislocation.  Follow-up recommended.  Limited single view evaluation.                                        X-Ray Shoulder Trauma 3 view Left (Final result)  Result time 11/18/22 23:52:27      Final result by  Logan Patel MD (11/18/22 23:52:27)                   Impression:      Left anterior shoulder dislocation similar to the prior study.  Follow-up recommended.    This report was flagged in Epic as abnormal.      Electronically signed by: Logan Patel  Date:    11/18/2022  Time:    23:52               Narrative:    EXAMINATION:  XR SHOULDER TRAUMA 3 VIEW LEFT    CLINICAL HISTORY:  Reduction;    TECHNIQUE:  Single view of the left shoulder    COMPARISON  11/18/2022    FINDINGS:  Anterior left shoulder dislocation persists.  No significant change.  Limited study.                                       X-Ray Chest AP Portable (Final result)  Result time 11/18/22 21:37:48      Final result by Christopher Pace MD (11/18/22 21:37:48)                   Impression:      Left substernal goiter results in widening of the left upper mediastinum and tracheal deviation to the right similar on chest x-ray compared to 10/26/2003 exam.    Subsegmental atelectatic change left base.  Small left-sided pneumothorax, better seen on CT study.    Left shoulder dislocation, unchanged from left shoulder x-ray performed earlier same day. Some deformity of the left 3rd rib likely corresponding to left 3rd rib fracture identified on CT.      Electronically signed by: Christopher Pace MD  Date:    11/18/2022  Time:    21:37               Narrative:    EXAMINATION:  XR CHEST AP PORTABLE    CLINICAL HISTORY:  sedation;    TECHNIQUE:  Single frontal view of the chest was performed.    COMPARISON:  Chest x-ray 10/26/2003. CT chest 11/18/2022.  Left shoulder x-ray 11/18/2022.    FINDINGS:  Left substernal goiter results in widening of the left upper mediastinum and tracheal deviation to the right similar on chest x-ray compared to 10/26/2003 exam.    Subsegmental atelectatic change left base.  Small left-sided pneumothorax, better seen on CT study.    No lobar consolidation.  No significant pleural effusion.    Cardiomediastinal silhouette is  otherwise unremarkable.    Left shoulder dislocation, unchanged from left shoulder x-ray performed earlier same day.  Some deformity of the left 3rd rib likely corresponding to left 3rd rib fracture identified on CT.                                        X-Ray Shoulder Trauma Left (Final result)  Result time 11/18/22 19:11:13      Final result by Logan Patel MD (11/18/22 19:11:13)                   Impression:      Persistent left anterior shoulder dislocation with no significant change.  Follow-up recommended.    This report was flagged in Epic as abnormal.      Electronically signed by: Logan Patel  Date:    11/18/2022  Time:    19:11               Narrative:    EXAMINATION:  XR SHOULDER TRAUMA 3 VIEW LEFT    CLINICAL HISTORY:  Unspecified dislocation of left shoulder joint, initial encounter    TECHNIQUE:  Three views of the left shoulder were performed.    COMPARISON  11/18/2022    FINDINGS:  Persistent left anterior shoulder dislocation, similar to the prior study.  Recommend follow-up.    Mild degenerative changes of the AC joint.    No fractures are detected.  Limited visualization of the glenoid with mild irregularity at the margins.                                       CT Head Without Contrast (Final result)  Result time 11/18/22 15:06:27      Final result by Jeffry Underwood MD (11/18/22 15:06:27)                   Impression:      No evidence of acute intracranial hemorrhage, allowing for the limitations patient motion artifact.      Electronically signed by: Jeffry Underwood MD  Date:    11/18/2022  Time:    15:06               Narrative:    EXAMINATION:  CT HEAD WITHOUT CONTRAST    CLINICAL HISTORY:  Head trauma, minor (Age >= 65y);    TECHNIQUE:  Low dose axial CT images obtained throughout the head without the use of intravenous contrast.  Axial, sagittal and coronal reconstructions were performed.    Examination mildly degraded by patient motion  artifact.    COMPARISON:  07/28/2022.    FINDINGS:  Intracranial compartment:    Ventricles and sulci are stable in size.  No evidence of hydrocephalus.    The brain parenchyma appears grossly unchanged.  No new parenchymal mass, hemorrhage, edema or major vascular distribution infarct.    No extra-axial blood or fluid collections.    Scattered vascular calcification about the skull base.    Skull/extracranial contents (limited evaluation):    No displaced calvarial fracture.    The mastoid air cells and visualized paranasal sinuses are essentially clear.                                       CT Cervical Spine Without Contrast (Final result)  Result time 11/18/22 15:26:15      Final result by Jose C Núñez MD (11/18/22 15:26:15)                   Impression:      1. No fracture.  2. Multilevel degenerative changes the cervical spine detailed above.  3. Left hydropneumothorax, see several report for interpretation of chest CT findings.  4. Large mass which appears to arise from the left thyroid demonstrating substantial mass effect upon the trachea.  Findings discussed with Dr. Souza at 15:08.      Electronically signed by: Jose C Núñez MD  Date:    11/18/2022  Time:    15:26               Narrative:    EXAMINATION:  CT CERVICAL SPINE WITHOUT CONTRAST    CLINICAL HISTORY:  Neck trauma (Age >= 65y);    TECHNIQUE:  Low dose axial images, sagittal and coronal reformations were performed though the cervical spine.  Contrast was not administered.    COMPARISON:  07/28/2022    FINDINGS:  Alignment: Grade 1 anterolisthesis noted at C3-C4.    Vertebrae: No fracture.  No lytic or blastic lesion.    Discs: Mild-to-moderate disc height loss seen throughout the cervical spine.    C1-2: Dens is intact.  Pre-dens space is maintained.    Skull base and craniocervical junction: Normal.    Degenerative findings:    C2-C3: Posterior disc osteophyte complex with uncovertebral spurring and severe right, moderate left facet  arthropathy result in moderate right neural foraminal narrowing.    C3-C4: Posterior disc osteophyte complex with uncovertebral spurring and severe right, mild left facet arthropathy result in mild spinal canal stenosis and severe right neural foraminal narrowing.    C4-C5: Posterior disc osteophyte complex with uncovertebral spurring and severe right, mild left facet arthropathy result in mild spinal canal stenosis and severe right neural foraminal narrowing.    C5-C6: Posterior disc osteophyte complex with uncovertebral spurring and moderate facet arthropathy result in mild spinal canal stenosis and severe right, moderate left neural foraminal narrowing.    C6-C7: No spinal canal stenosis or neural foraminal narrowing.    C7-T1: No spinal canal stenosis or neural foraminal narrowing.    Paraspinal muscles & soft tissues: Large soft tissue mass which appears to arise from the left thyroid demonstrates substantial mass effect upon the trachea.  Partially visualized left hydropneumothorax noted.                                        CT Chest Abdomen Pelvis With Contrast (xpd) (Final result)  Result time 11/18/22 16:44:24      Final result by Zaid Rajput MD (11/18/22 16:44:24)                   Impression:      1. Acute left-sided hydropneumothorax, likely secondary to interval acute appearing displaced fracture of the anterior left 3rd rib, which was likely impacted from new acute left anterior glenohumeral dislocation.  There is also minimally displaced acute fracture of the anterior left 2nd rib.  There is associated moderate left shoulder joint effusion and soft tissue swelling of the partially imaged surrounding left shoulder soft tissues.  No findings to suggest tension pneumothorax, and no right-sided pneumothorax or pneumomediastinum.  2. No evidence of solid organ injury or acute displaced fracture seen elsewhere within the chest, abdomen or pelvis.  3. Multiple varying age rib fractures involving the  right hemithorax which appear subacute/remote but new from 08/18/2022 study.  4. Markedly enlarged left thyroid nodule as described above, grossly stable.  There is continued mass effect on the larynx, trachea and esophagus with rightward displacement.  This is been present since at least 03/18/2003.  5. 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  6. Right renal nonobstructing nephrolithiasis.  There is mild to moderate left-sided hydronephrosis containing large staghorn type calculi within the mid to lower pole renal pelvis but no definite stone at the ureteropelvic junction or significant left ureteral dilatation, which could reflect sequela of a non radiodense stone or stricture at the UPJ.  Clinical correlation advised.  Further evaluation/follow-up as warranted.  7. Stable 1.2 cm hyperdense nodule arising from the left upper renal pole likely hemorrhagic or proteinaceous cyst.  8. Left adrenal 2.6 cm nodule which remains incompletely characterized.  Further evaluation with elective/nonemergent CT or MRI of the abdomen with adrenal mass protocol can be obtained as warranted.  9. Diverticulosis coli without acute diverticulitis.  10. Suspected uterine fibroids the largest of which appears partially calcified.  11. Several stable small right apical pulmonary nodules.  12. Grossly stable partially calcified soft tissue nodule within the left breast likely representing postsurgical change from prior lumpectomy.  13. Additional findings as above.  This report was flagged in Epic as abnormal.  This report was flagged in Epic as containing an incidental finding.    COMMUNICATION  This critical result was discovered/received at 15:50 hours.  The critical information above was relayed directly by me by telephone to Dr. Souza on 11/18/2022 at 15:54 hours.      Electronically signed by: Zaid Rajput MD  Date:    11/18/2022  Time:    16:44               Narrative:     EXAMINATION:  CT CHEST ABDOMEN PELVIS WITH CONTRAST (XPD)    CLINICAL HISTORY:  Polytrauma, blunt;    TECHNIQUE:  Axial images of the chest, abdomen, and pelvis were acquired  after the use of 75 cc [id number] IV contrast.  Coronal and sagittal reconstructions were generated.    COMPARISON:  Chest CT 08/18/2022    FINDINGS:  Base of the neck: Enlarged thyroid with very large left thyroid nodule measuring approximally 6.7 x 4 x 8.9 cm.  The nodule exhibits mass effect on the larynx and trachea with rightward displacement.  Grossly stable calcified nodule at the lower right thyroid lobe.  Overall size and configuration appears grossly stable.  No definite other cervical or supraclavicular lymphadenopathy.    Thoracic soft tissues: New left shoulder anterior glenohumeral dislocation further described in the bone section below.  There is associated moderate-sized left shoulder joint effusion and surrounding left shoulder soft tissue swelling involving the rotator cuff muscles and adjacent subcutaneous fat.  No subcutaneous emphysema or radiodense retained foreign body.    Partially calcified soft tissue nodule within the left breast likely representing postsurgical change from prior lumpectomy, stable.    Remaining partially imaged extrathoracic soft tissues are within normal limits.    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  Mild calcific atherosclerosis of the thoracic aorta.    Heart: Normal in size. No pericardial effusion. Mild coronary atherosclerosis.  No cardiac thrombus seen.    Aziza/Mediastinum: Overall stable.  No significant lymphadenopathy.  Small focus of non dependent gas within the junction of the SVC and innominate vein likely air introduced via and indwelling catheter/injection.    Lungs: Right lung is well expanded.  There is new overall small to moderate volume left-sided pneumothorax primarily along the anterior and medial aspect of the lung.  No right-sided pneumothorax.   There is associated new small to moderate volume simple appearing layering left-sided pleural effusion.  No right pleural effusion.  There is associated mild overlying compressive atelectasis of the left lung.  No new consolidation within either lung.  Minimal biapical pleuroparenchymal scarring.  Grossly stable multiple small nodules are noted within the right lung apex the largest of which measures 3 mm.    Liver: Normal in size and attenuation, with no focal hepatic lesions.    Gallbladder: Calcified gallbladder wall suggestive of porcelain gallbladder and suspected 7 mm stone within the gallbladder neck similar to prior.    Bile Ducts: No evidence of dilated ducts.    Pancreas: No mass or peripancreatic fat stranding.    Spleen: Unremarkable.    Adrenals: Right adrenal gland is within normal limits.  There is a 2.6 cm homogeneous intermediate density nodule which appears to arise from the lateral limb of the left adrenal gland with average 84 Hounsfield units.    Kidneys/ Ureters: Normal in length and location, noting mild diffuse cortical thinning but otherwise relatively symmetric normal enhancement.  Grossly stable 1.2 cm hyperdense nodule arising from the left renal upper pole, favoring hemorrhagic cystic lesion.  No right hydronephrosis.  Multiple scattered small nephroliths throughout the right kidney ranging punctate to 2 mm.  There is mild to moderate left-sided hydronephrosis.  Several large calculi some of which demonstrate staghorn type configuration within the dependent aspect of the left renal pelvis at the mid to lower pole with the largest measuring 2.5 cm.  Few additional scattered small caliceal hyperattenuating foci suggesting nephroliths throughout the left kidney ranging punctate to 2 mm.  No radiodense calculus seen within the ureters on either side or at the left UPJ.  No ureteral dilatation.    Bladder: Well distended without evidence of wall thickening.    Reproductive organs: Prominent,  lobulated and heterogeneous uterus with scattered parenchymal calcifications suggesting underlying fibroids with the largest area in the left aspect.  No adnexal mass seen.  Multiple pelvic phleboliths noted.  No significant amount of free fluid in the pelvis.    GI Tract/Mesentery: Small hiatal hernia.  Esophagus is displaced from thyroid mass, stable.  Otherwise unremarkable.  Numerous scattered colonic diverticula without acute diverticulitis.  Appendix and terminal ileum are within normal limits.  Mild amount of scattered fecal material throughout the colon.  No evidence of bowel obstruction or inflammation.    Peritoneal Space: No ascites. No free air.    Retroperitoneum:  No significant adenopathy.    Abdominal wall:  Tiny fat containing umbilical hernia.  Subcutaneous stranding at the lateral aspect of the left hip and posterolateral left gluteal region which may be related to recent trauma versus remote trauma or surgery.  Left gluteal/hip small soft tissue calcifications likely injection granulomas.  Bilateral small fat containing inguinal hernias.  No subcutaneous emphysema or radiodense retained foreign body.    Vasculature: Pulmonary trunk is within normal limits.  No saddle embolus.  Pulmonary arteries distribute normally.  There are 4 main pulmonary veins draining to the left atrium.  Mild scattered calcific atherosclerosis of the aorta extending into its proximal mesenteric branches and iliac branches.  No aortic aneurysm or dissection.    Bones: Generalized osteopenia.  There is acute posttraumatic anterior and medial dislocation of the left humerus with respect to the glenohumeral joint, with the head extending medially within the subpectoral upper chest wall soft tissues near the left 1st rib.  There is associated left shoulder moderate-sized joint effusion with associated soft tissue swelling involving surrounding rotator cuff musculature and subcutaneous fat noting noninclusion of the lateral  most aspect of the left shoulder and upper left chest wall.  Acute fracture with displacement and mild depression of the anterior left 3rd rib.  Acute minimally displaced fracture involving the anterior left 2nd rib.  Unchanged small well corticated ossific bodies adjacent to the left glenoid.  No acute displaced fracture seen of the left clavicle, scapula, sternum or contralateral chest wall or thoracic spine.  Chronic appearing minimal anterior wedge deformity of several mid to lower thoracic vertebral bodies with mildly exaggerated thoracic kyphosis, stable.  Stable small sclerotic focus at the anterior inferior aspect of T4 vertebral body, stable small sclerotic focus within the posterior aspect of T9 vertebral body, and stable small sclerotic focus within the anterior aspect of T11 vertebral body, likely bone islands.  Small sclerotic foci suggestive of bone islands within L1 and L2 vertebral bodies.  Moderate to advanced facet arthrosis at the mid to lower lumbar spine with degenerative related grade 1 anterolisthesis of L3 on 4 and L4 on 5.  No subcutaneous emphysema or radiodense retained foreign body.    There are multiple healing fractures involving the lateral right 2nd, 4th, 5th, 6th ribs, new from 08/18/2022 but appear subacute/remote.  Stable small sclerotic focus within the posterolateral right 8th rib.  Stable                                       X-Ray Shoulder Trauma Left (Final result)  Result time 11/18/22 11:57:47      Final result by Geoffrey Randall MD (11/18/22 11:57:47)                   Impression:      See above      Electronically signed by: Geoffrey Randall MD  Date:    11/18/2022  Time:    11:57               Narrative:    EXAMINATION:  XR SHOULDER TRAUMA 3 VIEW LEFT    CLINICAL HISTORY:  Unspecified fall, initial encounter    TECHNIQUE:  Three views of the left shoulder were performed.    COMPARISON  No 11/17/2020 ne    FINDINGS:  Anterior dislocation at the left shoulder identified as  before.  DJD.  Irregular glenoid identified similar to the previous study.  Several calcifications also identified adjacent to the humeral head..  CT scan would be helpful for further more definite evaluation                                       X-Ray Humerus 2 View Left (Final result)  Result time 11/18/22 12:02:12      Final result by Geoffrey Randall MD (11/18/22 12:02:12)                   Impression:      See above      Electronically signed by: Geoffrey Randall MD  Date:    11/18/2022  Time:    12:02               Narrative:    EXAMINATION:  XR HUMERUS 2 VIEW LEFT    CLINICAL HISTORY:  Unspecified fall, initial encounter    TECHNIQUE:  Left humerus two views    COMPARISON:  None    FINDINGS:  Anterior shoulder dislocation identified.  No definite fracture or dislocation involving the humerus.                                       X-Ray Elbow Complete Left (Final result)  Result time 11/18/22 12:11:12      Final result by Geoffrey Randall MD (11/18/22 12:11:12)                   Impression:      See above      Electronically signed by: Geoffrey Randall MD  Date:    11/18/2022  Time:    12:11               Narrative:    EXAMINATION:  XR ELBOW COMPLETE 3 VIEW LEFT    CLINICAL HISTORY:  Unspecified fall, initial encounter    TECHNIQUE:  AP, lateral, and oblique views of the left elbow were performed.    COMPARISON:  Non 11/17/2022 e    FINDINGS:  No fracture or dislocation.  No bone destruction identified.                                       X-Ray Forearm Left (Final result)  Result time 11/18/22 12:09:28      Final result by Geoffrey Randall MD (11/18/22 12:09:28)                   Impression:      See above      Electronically signed by: Geoffrey Randall MD  Date:    11/18/2022  Time:    12:09               Narrative:    EXAMINATION:  XR FOREARM LEFT    CLINICAL HISTORY:  Unspecified fall, initial encounter    TECHNIQUE:  AP and lateral views of the left forearm were performed.    COMPARISON:  None    FINDINGS:  DJD.  No  fracture or dislocation.  No bone destruction identified                                       X-Ray Wrist Complete Left (Final result)  Result time 11/18/22 11:54:53      Final result by Geoffrey Randall MD (11/18/22 11:54:53)                   Impression:      See above      Electronically signed by: Geoffrey Randall MD  Date:    11/18/2022  Time:    11:54               Narrative:    EXAMINATION:  XR WRIST COMPLETE 3 VIEWS LEFT    CLINICAL HISTORY:  Unspecified fall, initial encounter    TECHNIQUE:  PA, lateral, and oblique views of the left wrist were performed.    COMPARISON:  None    FINDINGS:  DJD most significant the 1st carpometacarpal articulation.  No fracture or dislocation.  No bone destruction identified.  Chondrocalcinosis.                                       X-Ray Hand 3 View Left (Final result)  Result time 11/18/22 12:08:59      Final result by Geoffrey Randall MD (11/18/22 12:08:59)                   Impression:      See above      Electronically signed by: Geoffrey Randall MD  Date:    11/18/2022  Time:    12:08               Narrative:    EXAMINATION:  XR HAND COMPLETE 3 VIEW LEFT    CLINICAL HISTORY:  fall;.    TECHNIQUE:  PA, lateral, and oblique views of the left hand were performed.    COMPARISON:  No 11/17/2022 ne    FINDINGS:  DJD involving the D IP joint but most significantly the 1st carpometacarpal articulation.  No acute fracture or dislocation.  No bone destruction identified.  Chondrocalcinosis.  Soft tissue swelling noted at the dorsum of the hand.                                       Medications   LIDOcaine (PF) 10 mg/ml (1%) injection 10 mg (has no administration in time range)   sodium chloride 0.9% flush 10 mL (has no administration in time range)   ondansetron disintegrating tablet 8 mg (has no administration in time range)   melatonin tablet 6 mg (has no administration in time range)   acetaminophen tablet 650 mg (has no administration in time range)   lactated ringers infusion (0 mL/hr  Intravenous Hold 11/18/22 2030)   acetaminophen tablet 650 mg (650 mg Oral Given 11/19/22 0635)   oxyCODONE immediate release tablet 5 mg (has no administration in time range)   oxyCODONE immediate release tablet Tab 10 mg (has no administration in time range)   HYDROmorphone injection 0.5 mg (has no administration in time range)   LIDOcaine (PF) 10 mg/ml (1%) injection 100 mg (0 mg Infiltration Hold 11/18/22 1915)   ketorolac injection 15 mg (15 mg Intravenous Given 11/19/22 0635)   ARIPiprazole tablet 2 mg (2 mg Oral Not Given 11/18/22 2145)   docusate sodium capsule 100 mg (has no administration in time range)   FLUoxetine capsule 20 mg (has no administration in time range)   enoxaparin injection 30 mg (30 mg Subcutaneous Given 11/19/22 0956)   levalbuterol nebulizer solution 0.63 mg (has no administration in time range)   iohexoL (OMNIPAQUE 350) injection 75 mL (75 mLs Intravenous Given 11/18/22 1459)   propofol (DIPRIVAN) 10 mg/mL IVP (68 mg Intravenous Given 11/18/22 1750)   sodium chloride 0.9% bolus 1,000 mL (0 mLs Intravenous Stopped 11/19/22 0127)   propofol (DIPRIVAN) 10 mg/mL IVP (68 mg Intravenous Given 11/18/22 2041)   propofol (DIPRIVAN) 10 mg/mL IVP (50 mg Intravenous Given by Other 11/18/22 2052)     Medical Decision Making:   Initial Assessment:   82-year-old female who presents to ED for emergent evaluation of left shoulder injury post fall on Wednesday.  She is well-appearing and not in any apparent distress.  Differential Diagnosis:   - Shoulder dislocation:  Patient had prior x-ray showing anterior shoulder dislocation.  - Shoulder fracture  - Humeral fracture vs dislocation  - Radial fracture vs dislocation  - Ulnar fracture vs. dislocation  - Hand fracture  - Abdominal trauma  Clinical Tests:   Lab Tests: Ordered and Reviewed  Radiological Study: Reviewed and Ordered  ED Management:  Patient presents with left shoulder injury.  Obtained history and physical exam.  Fall was unwitnessed and  there are multiple bruises and ecchymosis on the left side, evidence of polytrauma.  Ordered X-rays of the left shoulder, humerus, elbow, forearm, wrist, and hand.  Ordered CT head, cervical spine, and chest abdomen and pelvis.  X-ray shows anterior should dislocation of the left shoulder.  CT head and cervical spine are unremarkable for acute findings.  CT chest abdomen pelvis shows left-sided hydropneumothorax and dislocation of the 2nd and 3rd rib.  Ordered CBC, CMP and lipase.      Discussed patient with change of shift ED team.  Change of shift ED team will reduce patient's left shoulder.          Attending Attestation:   Physician Attestation Statement for Resident:  As the supervising MD   Physician Attestation Statement: I have personally seen and examined this patient.   I agree with the above history.  -:   As the supervising MD I agree with the above PE.     As the supervising MD I agree with the above treatment, course, plan, and disposition.   -: 81 yo F with left shoulder dislocation referred to the ED  Patient reports mechanical fall, landed on her left side on Wed, had XR yesterday that showed shoulder dislocation  Denies any pain currently    LUE +2 rad pulse, + extensive swelling, no ttp to palpation, good , normal left wrist and elbow rom, unable to move left shoulder but not painful to palpation. Normal sensation in the m/r/u/a nerve distribution  Nc/at, aaox3  No midline CTL ttp, full rom neck  + extensive bruising on the left side chest/breast/flank/abdomen and epigastric area, however, no ttp  Pelvis stable  LE no edema, no ttp, full rom hips/knees/ankles      Patient poor historian, unsure if  any head injury, physical exam with extensive ecchymosis and LUE edema  XR prior to arrival reviewed left shoulder ant disloc, no acute findings left hand and left elbow xr  Will obtain CT h, CT C spine, CT c/a/p with contrast r/o traumatic injuries  CT chest with hydropneumothorax, rib fx. Patient  with no resp symptoms, normal oxygen sat at rest, will start NRB for PTX. Also Hg drop    Patient was signed-out to Dr. Souza  at the change of shift with plan for:   CT c/a/p read pending and shoulder reduction, GS consult. Likely admit   I have reviewed and agree with the residents interpretation of the following: lab data, x-rays and CT scans.  I have reviewed the following: old records at this facility.        Attending Critical Care:   Critical Care Times:   ==============================================================  Total Critical Care Time - exclusive of procedural time: 30 minutes.  ==============================================================  Critical care reasons: hydropneumothorax, anemia, shoulder dislocation.   Critical care was time spent personally by me on the following activities: obtaining history from patient or relative, examination of patient, review of x-rays / CT sent with the patient, review of old charts, ordering lab, x-rays, and/or EKG, development of treatment plan with patient or relative, ordering and performing treatments and interventions and discussion with consultants.   Critical Care Condition: potentially life-threatening                      Clinical Impression:   Final diagnoses:  [W19.XXXA] Fall  [S43.005A] Traumatic dislocation of shoulder region, left, initial encounter  [J94.8] Hydropneumothorax  [D64.9] Anemia, unspecified type  [S22.42XA] Closed fracture of multiple ribs of left side, initial encounter        ED Disposition Condition    Admit                 Aries Cabrales MD  Resident  11/19/22 0158       Sue Lennon MD  11/19/22 7198

## 2022-11-19 LAB
ALBUMIN SERPL BCP-MCNC: 2.4 G/DL (ref 3.5–5.2)
ALP SERPL-CCNC: 52 U/L (ref 55–135)
ALT SERPL W/O P-5'-P-CCNC: 32 U/L (ref 10–44)
ANION GAP SERPL CALC-SCNC: 9 MMOL/L (ref 8–16)
AST SERPL-CCNC: 35 U/L (ref 10–40)
BASOPHILS # BLD AUTO: 0.02 K/UL (ref 0–0.2)
BASOPHILS NFR BLD: 0.2 % (ref 0–1.9)
BILIRUB SERPL-MCNC: 0.7 MG/DL (ref 0.1–1)
BUN SERPL-MCNC: 42 MG/DL (ref 8–23)
CALCIUM SERPL-MCNC: 8.7 MG/DL (ref 8.7–10.5)
CHLORIDE SERPL-SCNC: 113 MMOL/L (ref 95–110)
CO2 SERPL-SCNC: 25 MMOL/L (ref 23–29)
CREAT SERPL-MCNC: 1.1 MG/DL (ref 0.5–1.4)
DIFFERENTIAL METHOD: ABNORMAL
EOSINOPHIL # BLD AUTO: 0 K/UL (ref 0–0.5)
EOSINOPHIL NFR BLD: 0.2 % (ref 0–8)
ERYTHROCYTE [DISTWIDTH] IN BLOOD BY AUTOMATED COUNT: 12.9 % (ref 11.5–14.5)
EST. GFR  (NO RACE VARIABLE): 49.9 ML/MIN/1.73 M^2
GLUCOSE SERPL-MCNC: 125 MG/DL (ref 70–110)
HCT VFR BLD AUTO: 23.9 % (ref 37–48.5)
HGB BLD-MCNC: 7.3 G/DL (ref 12–16)
IMM GRANULOCYTES # BLD AUTO: 0.07 K/UL (ref 0–0.04)
IMM GRANULOCYTES NFR BLD AUTO: 0.8 % (ref 0–0.5)
LYMPHOCYTES # BLD AUTO: 1 K/UL (ref 1–4.8)
LYMPHOCYTES NFR BLD: 12.2 % (ref 18–48)
MAGNESIUM SERPL-MCNC: 2 MG/DL (ref 1.6–2.6)
MCH RBC QN AUTO: 33 PG (ref 27–31)
MCHC RBC AUTO-ENTMCNC: 30.5 G/DL (ref 32–36)
MCV RBC AUTO: 108 FL (ref 82–98)
MONOCYTES # BLD AUTO: 0.8 K/UL (ref 0.3–1)
MONOCYTES NFR BLD: 9 % (ref 4–15)
NEUTROPHILS # BLD AUTO: 6.5 K/UL (ref 1.8–7.7)
NEUTROPHILS NFR BLD: 77.6 % (ref 38–73)
NRBC BLD-RTO: 0 /100 WBC
PHOSPHATE SERPL-MCNC: 4.5 MG/DL (ref 2.7–4.5)
PLATELET # BLD AUTO: 285 K/UL (ref 150–450)
PMV BLD AUTO: 12.9 FL (ref 9.2–12.9)
POTASSIUM SERPL-SCNC: 4.7 MMOL/L (ref 3.5–5.1)
PROT SERPL-MCNC: 5.5 G/DL (ref 6–8.4)
RBC # BLD AUTO: 2.21 M/UL (ref 4–5.4)
SODIUM SERPL-SCNC: 147 MMOL/L (ref 136–145)
WBC # BLD AUTO: 8.37 K/UL (ref 3.9–12.7)

## 2022-11-19 PROCEDURE — 85025 COMPLETE CBC W/AUTO DIFF WBC: CPT

## 2022-11-19 PROCEDURE — 94761 N-INVAS EAR/PLS OXIMETRY MLT: CPT

## 2022-11-19 PROCEDURE — 94640 AIRWAY INHALATION TREATMENT: CPT

## 2022-11-19 PROCEDURE — 99233 PR SUBSEQUENT HOSPITAL CARE,LEVL III: ICD-10-PCS | Mod: ,,, | Performed by: STUDENT IN AN ORGANIZED HEALTH CARE EDUCATION/TRAINING PROGRAM

## 2022-11-19 PROCEDURE — 99900031 HC PATIENT EDUCATION (STAT)

## 2022-11-19 PROCEDURE — 25000003 PHARM REV CODE 250

## 2022-11-19 PROCEDURE — 25000242 PHARM REV CODE 250 ALT 637 W/ HCPCS: Performed by: STUDENT IN AN ORGANIZED HEALTH CARE EDUCATION/TRAINING PROGRAM

## 2022-11-19 PROCEDURE — 84100 ASSAY OF PHOSPHORUS: CPT

## 2022-11-19 PROCEDURE — 25000003 PHARM REV CODE 250: Performed by: STUDENT IN AN ORGANIZED HEALTH CARE EDUCATION/TRAINING PROGRAM

## 2022-11-19 PROCEDURE — 36415 COLL VENOUS BLD VENIPUNCTURE: CPT

## 2022-11-19 PROCEDURE — 80053 COMPREHEN METABOLIC PANEL: CPT

## 2022-11-19 PROCEDURE — 83735 ASSAY OF MAGNESIUM: CPT

## 2022-11-19 PROCEDURE — 99233 SBSQ HOSP IP/OBS HIGH 50: CPT | Mod: ,,, | Performed by: STUDENT IN AN ORGANIZED HEALTH CARE EDUCATION/TRAINING PROGRAM

## 2022-11-19 PROCEDURE — 63600175 PHARM REV CODE 636 W HCPCS: Performed by: STUDENT IN AN ORGANIZED HEALTH CARE EDUCATION/TRAINING PROGRAM

## 2022-11-19 PROCEDURE — 11000001 HC ACUTE MED/SURG PRIVATE ROOM

## 2022-11-19 RX ORDER — OXYCODONE HYDROCHLORIDE 5 MG/1
5 TABLET ORAL EVERY 6 HOURS PRN
Qty: 15 TABLET | Refills: 0 | Status: SHIPPED | OUTPATIENT
Start: 2022-11-19 | End: 2023-05-03

## 2022-11-19 RX ORDER — LEVALBUTEROL INHALATION SOLUTION 0.63 MG/3ML
0.63 SOLUTION RESPIRATORY (INHALATION) EVERY 8 HOURS PRN
Status: DISCONTINUED | OUTPATIENT
Start: 2022-11-19 | End: 2022-11-21 | Stop reason: HOSPADM

## 2022-11-19 RX ORDER — KETOROLAC TROMETHAMINE 10 MG/1
10 TABLET, FILM COATED ORAL EVERY 6 HOURS
Qty: 20 TABLET | Refills: 0 | Status: SHIPPED | OUTPATIENT
Start: 2022-11-19 | End: 2022-11-26

## 2022-11-19 RX ADMIN — LEVALBUTEROL HYDROCHLORIDE 0.63 MG: 0.63 SOLUTION RESPIRATORY (INHALATION) at 07:11

## 2022-11-19 RX ADMIN — ACETAMINOPHEN 650 MG: 325 TABLET ORAL at 06:11

## 2022-11-19 RX ADMIN — SODIUM CHLORIDE 1000 ML: 0.9 INJECTION, SOLUTION INTRAVENOUS at 12:11

## 2022-11-19 RX ADMIN — KETOROLAC TROMETHAMINE 15 MG: 30 INJECTION, SOLUTION INTRAMUSCULAR; INTRAVENOUS at 05:11

## 2022-11-19 RX ADMIN — ACETAMINOPHEN 650 MG: 325 TABLET ORAL at 05:11

## 2022-11-19 RX ADMIN — KETOROLAC TROMETHAMINE 15 MG: 30 INJECTION, SOLUTION INTRAMUSCULAR; INTRAVENOUS at 12:11

## 2022-11-19 RX ADMIN — ENOXAPARIN SODIUM 30 MG: 30 INJECTION SUBCUTANEOUS at 09:11

## 2022-11-19 RX ADMIN — ACETAMINOPHEN 650 MG: 325 TABLET ORAL at 12:11

## 2022-11-19 RX ADMIN — KETOROLAC TROMETHAMINE 15 MG: 30 INJECTION, SOLUTION INTRAMUSCULAR; INTRAVENOUS at 06:11

## 2022-11-19 RX ADMIN — ARIPIPRAZOLE 2 MG: 2 TABLET ORAL at 09:11

## 2022-11-19 NOTE — ASSESSMENT & PLAN NOTE
Nettie Villasenor is a 82 y.o. female with PMH of mental disability, breast cancer, osteopenia, and schizophrenia who is presenting to [facility] [place] a [place] at her nursing home 3 days ago, with anterior dislocation of L shoulder, multiple L rib fractures, and L hydropneumothorax. Closed, NVI. No known prior injury/surgery to L shoulder.    - Closed reduced in ED after multiple attempts. L shoulder remains unstable  - Sling and swathe at all times  - NWB LUE  - Pain control per primary  - DVTppx per primary  - Admitted to gen surg for trauma

## 2022-11-19 NOTE — CONSULTS
[facility] - Emergency Dept  General Surgery  Consult Note    Patient Name: Nettie Villasenor  MRN: [id number]  Code Status: Prior  Admission Date: 11/18/2022  Hospital Length of Stay: 0 days  Attending Physician: Sue Lennon MD  Primary Care Provider: Carolina Chavez MD    Patient information was obtained from patient, caregiver / friend and ER records.     Inpatient consult to General surgery  Consult performed by: Jeannie Otto MD  Consult ordered by: Negar Rowan MD        Subjective:     Principal Problem: <principal problem not specified>    History of Present Illness: Nettie Villasenor  is a 82 y.o. female with a pmhx of mental disability, breast cancer, osteopenia, and schizophrenia who [place] [facility] [place] home, [facility]. On imaging, the patient was found to have a anterior dislocation of her left shoulder, as well as, a dislocated fracture of her L 3rd rib and a minimally displaced fracture of her L 2nd rib w/ associated L hydropneumothorax. In the ED, the patient is stable and in no acute distress. She was placed on a non rebreather and satting in the high 90s. Her vitals are stable. L shoulder to be reduced in the ED under conscious sedation. General surgery consulted to evaluate after poly trauma.          Current Facility-Administered Medications on File Prior to Encounter   Medication    0.9%  NaCl infusion    fentaNYL injection 25 mcg    midazolam (VERSED) 1 mg/mL injection 0.5 mg     Current Outpatient Medications on File Prior to Encounter   Medication Sig    acetaminophen (TYLENOL) 500 MG tablet Take 1,000 mg by mouth every evening.    alendronate (FOSAMAX) 70 MG tablet TAKE 1 TABLET BY MOUTH EVERY 7 DAYS    anastrozole (ARIMIDEX) 1 mg Tab Take 1 tablet (1 mg total) by mouth once daily.    ARIPiprazole (ABILIFY) 2 MG Tab TAKE 1 TABLET BY MOUTH EVERY EVENING at bedtime    aspirin (ECOTRIN) 81 MG EC tablet TAKE 1 TABLET BY MOUTH daily TO PREVENT CLOTS     aspirin 81 MG Chew Take 81 mg by mouth once daily.    calcium carbonate (OS-OTTONIEL) 500 mg calcium (1,250 mg) tablet Take 1 tablet by mouth 2 (two) times daily.    COVID-19 vacc,mRNA,Moderna,/PF (MODERNA COVID-19 VACCINE, EUA, IM) ADMINISTER 0.5ML IN THE MUSCLE AS DIRECTED    docusate sodium (COLACE) 100 MG capsule Take 100 mg by mouth once daily.    fexofenadine (ALLEGRA) 180 MG tablet Take 180 mg by mouth daily as needed.    FLUoxetine 20 MG capsule TAKE 1 CAPSULE BY MOUTH every morning    HYDROcodone-acetaminophen (NORCO) 5-325 mg per tablet Take 1 tablet by mouth every 6 (six) hours as needed for Pain.    lorazepam (ATIVAN) 0.5 MG tablet Take 0.5 mg by mouth every 12 (twelve) hours as needed.    multivitamin capsule Take 1 capsule by mouth once daily.    vitamin D 1000 units Tab Take 185 mg by mouth once daily.       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    Osteopenia 3/13/2013    Schizophrenia 3/13/2013    Thyroid nodule      Past Surgical History:   Procedure Laterality Date    BREAST BIOPSY Left     BREAST LUMPECTOMY      LUMPECTOMY,BREAST,WITH RADIOACTIVE SEED LOCALIZATION Left 09/04/2020    Procedure: LUMPECTOMY,BREAST,WITH RADIOACTIVE SEED LOCALIZATION reflector placed on 9/2/20;  Surgeon: Los Benites MD;  Location: [facility];  Service: General;  Laterality: Left;    ORIF left lower leg      TONSILLECTOMY       Family History       Problem Relation (Age of Onset)    Dementia Mother    Heart attack Father          Tobacco Use    Smoking status: Never    Smokeless tobacco: Never   Substance and Sexual Activity    Alcohol use: No    Drug use: No    Sexual activity: Never     Review of Systems   Constitutional:  Negative for chills and fever.   HENT:  Negative for trouble swallowing and voice change.    Eyes:  Negative  for visual disturbance.   Respiratory:  Negative for cough, chest tightness, shortness of breath, wheezing and stridor.    Cardiovascular:  Negative for chest pain and palpitations.   Gastrointestinal:  Negative for abdominal distention, abdominal pain, nausea and vomiting.   Endocrine: Negative.    Genitourinary:  Negative for difficulty urinating and dysuria.   Musculoskeletal:  Positive for joint swelling.   Skin:  Positive for color change (ecchymosis to proximal L upper extremity).   Neurological:  Negative for syncope and light-headedness.   Psychiatric/Behavioral:  Negative for agitation.    Objective:     Vital Signs (Most Recent):  Temp: 97.2 °F (36.2 °C) (11/18/22 0940)  Pulse: 89 (11/18/22 1700)  Resp: 16 (11/18/22 1700)  BP: 109/67 (11/18/22 1700)  SpO2: 99 % (11/18/22 1700) Vital Signs (24h Range):  Temp:  [97.2 °F (36.2 °C)] 97.2 °F (36.2 °C)  Pulse:  [] 89  Resp:  [16-20] 16  SpO2:  [97 %-99 %] 99 %  BP: (109-159)/(57-91) 109/67     Weight: 68 kg (150 lb)  Body mass index is 27.44 kg/m².    Physical Exam  Vitals and nursing note reviewed.   Constitutional:       General: She is not in acute distress.     Appearance: Normal appearance.   HENT:      Head: Normocephalic and atraumatic.      Nose: Nose normal.      Mouth/Throat:      Mouth: Mucous membranes are dry.   Eyes:      Pupils: Pupils are equal, round, and reactive to light.   Cardiovascular:      Rate and Rhythm: Normal rate.      Pulses: Normal pulses.   Pulmonary:      Effort: Pulmonary effort is normal. No respiratory distress.      Breath sounds: No wheezing.      Comments: On non rebreather mask; L chest TTP; no paradoxical chest wall movements  Abdominal:      General: Abdomen is flat. There is no distension.      Palpations: Abdomen is soft.      Tenderness: There is no abdominal tenderness. There is no guarding or rebound.   Musculoskeletal:         General: Swelling, tenderness, deformity and signs of injury present.      Cervical  back: Normal range of motion.      Comments: Left should with obvious dislocation; ecchymosis to LUE and soft tissue swelling    Skin:     General: Skin is warm.   Neurological:      Mental Status: She is alert. Mental status is at baseline.   Psychiatric:         Mood and Affect: Mood normal.         Behavior: Behavior normal.       Significant Labs:  I have reviewed all pertinent lab results within the past 24 hours.  CBC:   Recent Labs   Lab 11/18/22  1236   WBC 7.62   RBC 2.64*   HGB 8.6*   HCT 27.4*      *   MCH 32.6*   MCHC 31.4*     CMP:   Recent Labs   Lab 11/18/22  1236   GLU 95   CALCIUM 9.7   ALBUMIN 2.7*   PROT 6.7      K 4.2   CO2 29      BUN 34*   CREATININE 0.9   ALKPHOS 62   ALT 39   AST 29   BILITOT 0.5       Significant Diagnostics:  I have reviewed all pertinent imaging results/findings within the past 24 hours.    X-Ray Humerus 2 View Left  Anterior shoulder dislocation identified.  No definite fracture or dislocation involving the humerus.       X-Ray Elbow 2 Views Left  Visualized osseous structures appear unremarkable, with no evidence of recent fracture, lytic destructive process, or other significant abnormality identified.  No demonstrable joint effusion.  A tiny olecranon spur is incidentally observed.       X-Ray Elbow Complete Left  No fracture or dislocation.  No bone destruction identified.       X-Ray Forearm Left  DJD.  No fracture or dislocation.  No bone destruction identified       X-Ray Wrist Complete Left   DJD most significant the 1st carpometacarpal articulation.  No fracture or dislocation.  No bone destruction identified.  Chondrocalcinosis.     X-Ray Wrist Complete 3 views Left  DJD mainly affecting the 1st carpometacarpal articulation.  No definite acute fracture or dislocation.  No bone destruction identified.  Chondrocalcinosis.     X-Ray Hand 3 View Left  DJD involving the D IP joint but most significantly the 1st carpometacarpal articulation.   No acute fracture or dislocation.  No bone destruction identified.  Chondrocalcinosis.  Soft tissue swelling noted at the dorsum of the hand.     X-Ray Hand 3 View Left  Diffuse soft tissue swelling.  Correlate clinically. No acute osseous abnormality.  DJD, more severe at 1st CMC joint of wrist.  Chondrocalcinosis also noted at the wrist.     CT Head Without Contrast  No evidence of acute intracranial hemorrhage, allowing for the limitations patient motion artifact.    CT Cervical Spine Without Contrast  1. No fracture. 2. Multilevel degenerative changes the cervical spine detailed above. 3. Left hydropneumothorax, see several report for interpretation of chest CT findings. 4. Large mass which appears to arise from the left thyroid demonstrating substantial mass effect upon the trachea.         CT Chest Abdomen Pelvis With Contrast (xpd)   1. Acute left-sided hydropneumothorax, likely secondary to interval acute appearing displaced fracture of the anterior left 3rd rib, which was likely impacted from new acute left anterior glenohumeral dislocation.  There is also minimally displaced acute fracture of the anterior left 2nd rib.  There is associated moderate left shoulder joint effusion and soft tissue swelling of the partially imaged surrounding left shoulder soft tissues.  No findings to suggest tension pneumothorax, and no right-sided pneumothorax or pneumomediastinum. 2. No evidence of solid organ injury or acute displaced fracture seen elsewhere within the chest, abdomen or pelvis. 3. Multiple varying age rib fractures involving the right hemithorax which appear subacute/remote but new from 08/18/2022 study. 4. Markedly enlarged left thyroid nodule as described above, grossly stable.  There is continued mass effect on the larynx, trachea and esophagus with rightward displacement.  This is been present since at least 03/18/2003. 5. 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  6. Right renal nonobstructing nephrolithiasis.  There is mild to moderate left-sided hydronephrosis containing large staghorn type calculi within the mid to lower pole renal pelvis but no definite stone at the ureteropelvic junction or significant left ureteral dilatation, which could reflect sequela of a non radiodense stone or stricture at the UPJ.  Clinical correlation advised.  Further evaluation/follow-up as warranted. 7. Stable 1.2 cm hyperdense nodule arising from the left upper renal pole likely hemorrhagic or proteinaceous cyst. 8. Left adrenal 2.6 cm nodule which remains incompletely characterized.  Further evaluation with elective/nonemergent CT or MRI of the abdomen with adrenal mass protocol can be obtained as warranted. 9. Diverticulosis coli without acute diverticulitis. 10. Suspected uterine fibroids the largest of which appears partially calcified. 11. Several stable small right apical pulmonary nodules. 12. Grossly stable partially calcified soft tissue nodule within the left breast likely representing postsurgical change from prior lumpectomy. 13. Additional findings as above.     X-Ray Shoulder Trauma Left  Anterior dislocation at the left shoulder identified as before.  DJD.  Irregular glenoid identified similar to the previous study.  Several calcifications also identified adjacent to the humeral head..  CT scan would be helpful for further more definite evaluation     X-Ray Shoulder Trauma 3 View Bilateral  DJD and rotator cuff pathology of the right shoulder. Anterior dislocation left shoulder.         Assessment/Plan:     Rosas Villasenor is a 82 year old female with a pmhx of mental disability, breast cancer, osteopenia, and schizophrenia who is presenting to [facility] [place] after a fall at her nursing home, [facility]. imaging showing anterior dislocation of the left shoulder and surrounding soft tissue swelling as well as L 2-3rd rib fxs with associated L sided hydropneumothroax. No other  fractures found on imaging (see above).     -- admit to general surgery   -- ED to reduce shoulder at bedside  -- MM pain control   -- prn nausea control   -- NPO for now, mIVF   -- aggressive pulmonary toilet   -- non rebreather       VTE Risk Mitigation (From admission, onward)    None          Thank you for your consult. I will follow-up with patient. Please contact us if you have any additional questions.    Jeannie Otto MD  General Surgery  [facility] - Emergency Dept

## 2022-11-19 NOTE — PROGRESS NOTES
[facility] - [facility]  General Surgery  Progress Note    Subjective:     History of Present Illness:  Nettie Villasenor  is a 82 y.o. female with a pmhx of mental disability, breast cancer, osteopenia, and schizophrenia who [place] [facility] [place], [facility]. On imaging, the patient was found to have a anterior dislocation of her left shoulder, as well as, a dislocated fracture of her L 3rd rib and a minimally displaced fracture of her L 2nd rib w/ associated L hydropneumothorax. In the ED, the patient is stable and in no acute distress. She was placed on a non rebreather and satting in the high 90s. Her vitals are stable. L shoulder to be reduced in the ED under conscious sedation. General surgery consulted to evaluate after poly trauma.          Post-Op Info:  * No surgery found *         Interval History:   No acute events overnight. Pain well controlled. L should reduced in ED, sling in place. Satting well on RA.     Medications:  Continuous Infusions:   lactated ringers Stopped (11/18/22 2030)     Scheduled Meds:   acetaminophen  650 mg Oral Q6H    ARIPiprazole  2 mg Oral QHS    docusate sodium  100 mg Oral Daily    enoxaparin  30 mg Subcutaneous Q12H    FLUoxetine  20 mg Oral QAM    ketorolac  15 mg Intravenous Q6H     PRN Meds:acetaminophen, HYDROmorphone, levalbuterol, LIDOcaine (PF) 10 mg/ml (1%), melatonin, ondansetron, oxyCODONE, oxyCODONE, sodium chloride 0.9%     Review of patient's allergies indicates:  No Known Allergies  Objective:     Vital Signs (Most Recent):  Temp: 97.4 °F (36.3 °C) (11/19/22 0058)  Pulse: 91 (11/19/22 0734)  Resp: 18 (11/19/22 0734)  BP: 102/62 (11/19/22 0058)  SpO2: 96 % (11/19/22 0734) Vital Signs (24h Range):  Temp:  [97.2 °F (36.2 °C)-98.2 °F (36.8 °C)] 97.4 °F (36.3 °C)  Pulse:  [] 91  Resp:  [14-20] 18  SpO2:  [93 %-100 %] 96 %  BP: (102-159)/(56-91) 102/62     Weight: 66.7 kg (147 lb)  Body mass index is 26.89  kg/m².    Intake/Output - Last 3 Shifts       None            Physical Exam  Vitals and nursing note reviewed.   Constitutional:       General: She is not in acute distress.     Appearance: Normal appearance.   HENT:      Head: Normocephalic and atraumatic.      Nose: Nose normal.      Mouth/Throat:      Mouth: Mucous membranes are dry.   Eyes:      Pupils: Pupils are equal, round, and reactive to light.   Cardiovascular:      Rate and Rhythm: Normal rate.      Pulses: Normal pulses.   Pulmonary:      Effort: Pulmonary effort is normal. No respiratory distress.      Breath sounds: No wheezing.      Comments: Room air; L chest TTP; no paradoxical chest wall movements  Abdominal:      General: Abdomen is flat. There is no distension.      Palpations: Abdomen is soft.      Tenderness: There is no abdominal tenderness. There is no guarding or rebound.   Musculoskeletal:         General: Swelling, tenderness, deformity and signs of injury present.      Cervical back: Normal range of motion.      Comments: Left shoulder now reduced, sling in place; ecchymosis to LUE and soft tissue swelling    Skin:     General: Skin is warm.   Neurological:      Mental Status: She is alert. Mental status is at baseline.   Psychiatric:         Mood and Affect: Mood normal.         Behavior: Behavior normal.       Significant Labs:  I have reviewed all pertinent lab results within the past 24 hours.  CBC:   Recent Labs   Lab 11/19/22  0614   WBC 8.37   RBC 2.21*   HGB 7.3*   HCT 23.9*      *   MCH 33.0*   MCHC 30.5*     CMP:   Recent Labs   Lab 11/19/22  0614   *   CALCIUM 8.7   ALBUMIN 2.4*   PROT 5.5*   *   K 4.7   CO2 25   *   BUN 42*   CREATININE 1.1   ALKPHOS 52*   ALT 32   AST 35   BILITOT 0.7       Significant Diagnostics:  I have reviewed all pertinent imaging results/findings within the past 24 hours.  CXR with stable L ptx    Assessment/Plan:     * Fall  Nettie Villasenor is a 82 year old female with  a pmhx of mental disability, breast cancer, osteopenia, and schizophrenia who is [place] [facility] [place] at her nursing home, [facility]. imaging showing anterior dislocation of the left shoulder and surrounding soft tissue swelling as well as L 2-3rd rib fxs with associated L sided hydropneumothroax. No other fractures found on imaging (see above).     -- orthopedics consulted for shoulder reduction in ED, appreciate assistance  -- MM pain control   -- prn nausea control   -- regular diet   -- aggressive pulmonary toilet       Dispo: possible discharge today, will speak with SW to assure patient has adequate therapy options at [facility]. Will refer to endocrine surgery to work up thyroid nodule.          Jeannie Otto MD  General Surgery  [facility] - [facility]

## 2022-11-19 NOTE — CONSULTS
[facility] - Emergency Dept  Orthopedics  Consult Note    Patient Name: Nettie Villasenor  MRN: [id number]  Admission Date: 11/18/2022  Hospital Length of Stay: 1 days  Attending Provider: Patrick Murguia MD  Primary Care Provider: Carolina Chavez MD         Inpatient consult to Orthopedic Surgery  Consult performed by: Jeffry Garnett MD  Consult ordered by: Negar Rowan MD        Subjective:     Principal Problem:Fall    Chief Complaint:   Chief Complaint   Patient presents with    Shoulder Injury     From [facility], with caretaker, sent to ed for eval of dislocated left shoulder. Left hand swelling and bruising        HPI: Nettie Villasenor  is a 82 y.o. female with a pmhx of mental disability, breast cancer, osteopenia, and schizophrenia who is presenting to [facility] [place] after a fall at her nursing home, [facility]. On imaging, the patient was found to have a anterior dislocation of her left shoulder, as well as, a dislocated fracture of her L 3rd rib and a minimally displaced fracture of her L 2nd rib w/ associated L hydropneumothorax. In the ED, the patient is stable and in no acute distress. She was placed on a non rebreather and satting in the high 90s. Her vitals are stable. Ortho consulted after attempted L shoulder reduction was unsuccessful.       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    Osteopenia 3/13/2013    Schizophrenia 3/13/2013    Thyroid nodule        Past Surgical History:   Procedure Laterality Date    BREAST BIOPSY Left     BREAST LUMPECTOMY      LUMPECTOMY,BREAST,WITH RADIOACTIVE SEED LOCALIZATION Left 09/04/2020    Procedure: LUMPECTOMY,BREAST,WITH RADIOACTIVE SEED LOCALIZATION reflector placed on 9/2/20;  Surgeon: Los Benites MD;  Location: [facility];  Service: General;  Laterality: Left;    ORIF left lower leg      TONSILLECTOMY         Review of patient's allergies  indicates:  No Known Allergies    Current Facility-Administered Medications   Medication    LIDOcaine (PF) 10 mg/ml (1%) injection 100 mg    propofol (DIPRIVAN) 10 mg/mL IVP    sodium chloride 0.9% bolus 1,000 mL     Current Outpatient Medications   Medication Sig    acetaminophen (TYLENOL) 500 MG tablet Take 1,000 mg by mouth every evening.    alendronate (FOSAMAX) 70 MG tablet TAKE 1 TABLET BY MOUTH EVERY 7 DAYS    anastrozole (ARIMIDEX) 1 mg Tab Take 1 tablet (1 mg total) by mouth once daily.    ARIPiprazole (ABILIFY) 2 MG Tab TAKE 1 TABLET BY MOUTH EVERY EVENING at bedtime    aspirin (ECOTRIN) 81 MG EC tablet TAKE 1 TABLET BY MOUTH daily TO PREVENT CLOTS    aspirin 81 MG Chew Take 81 mg by mouth once daily.    calcium carbonate (OS-OTTONIEL) 500 mg calcium (1,250 mg) tablet Take 1 tablet by mouth 2 (two) times daily.    COVID-19 vacc,mRNA,Moderna,/PF (MODERNA COVID-19 VACCINE, EUA, IM) ADMINISTER 0.5ML IN THE MUSCLE AS DIRECTED    docusate sodium (COLACE) 100 MG capsule Take 100 mg by mouth once daily.    fexofenadine (ALLEGRA) 180 MG tablet Take 180 mg by mouth daily as needed.    FLUoxetine 20 MG capsule TAKE 1 CAPSULE BY MOUTH every morning    HYDROcodone-acetaminophen (NORCO) 5-325 mg per tablet Take 1 tablet by mouth every 6 (six) hours as needed for Pain.    lorazepam (ATIVAN) 0.5 MG tablet Take 0.5 mg by mouth every 12 (twelve) hours as needed.    multivitamin capsule Take 1 capsule by mouth once daily.    vitamin D 1000 units Tab Take 185 mg by mouth once daily.     Facility-Administered Medications Ordered in Other Encounters   Medication    0.9%  NaCl infusion    fentaNYL injection 25 mcg    midazolam (VERSED) 1 mg/mL injection 0.5 mg     Family History       Problem Relation (Age of Onset)    Dementia Mother    Heart attack Father          Tobacco Use    Smoking status: Never    Smokeless tobacco: Never   Substance and Sexual Activity    Alcohol use: No    Drug use: No    Sexual  "activity: Never     ROS  Constitutional: negative for fevers  Eyes: negative visual changes  ENT: negative for hearing loss  Respiratory: negative for dyspnea  Cardiovascular: negative for chest pain  Gastrointestinal: negative for abdominal pain  Genitourinary: negative for dysuria  Neurological: negative for headaches  Behavioral/Psych: negative for hallucinations  Endocrine: negative for temperature intolerance     Objective:     Vital Signs (Most Recent):  Temp: 97.2 °F (36.2 °C) (11/18/22 0940)  Pulse: 90 (11/18/22 1900)  Resp: 18 (11/18/22 1900)  BP: (!) 141/66 (11/18/22 1900)  SpO2: 100 % (11/18/22 1900)   Vital Signs (24h Range):  Temp:  [97.2 °F (36.2 °C)] 97.2 °F (36.2 °C)  Pulse:  [] 90  Resp:  [14-20] 18  SpO2:  [97 %-100 %] 100 %  BP: (109-159)/(56-91) 141/66     Weight: 68 kg (150 lb)  Height: 5' 2" (157.5 cm)  Body mass index is 27.44 kg/m².    No intake or output data in the 24 hours ending 11/18/22 2027    Ortho/SPM Exam  General:  no acute distress, appears stated age   Neuro: alert and oriented x3  Psych: normal mood  Head: normocephalic, atraumatic.  Eyes: no scleral icterus  Mouth: moist mucous membranes  Cardiovascular: extremities warm and well perfused  Lungs: breathing comfortably, equal chest rise bilat  Skin: clean, dry, intact (any exceptions noted in below musculoskeletal exam)    MSK:  RUE:  - Skin intact throughout, no open wounds  - No swelling  - No ecchymosis, erythema, or signs of cellulitis  - NonTTP throughout  - AROM and PROM of the shoulder, elbow, wrist, and hand intact without pain  - Axillary/AIN/PIN/Radial/Median/Ulnar Nerves assessed in isolation without deficit  - SILT throughout  - Compartments soft  - Radial artery palpated   - Capillary Refill <3s    LUE:  - Skin intact throughout, no open wounds  - 3+ pitting edema entire LUE   - Extensive ecchymosis  - TTP L shoulder/proximal humerus  - ROM L shoulder limited 2/2 pain  - AROM and PROM of the elbow, wrist, and " hand intact without pain  - Axillary/AIN/PIN/Radial/Median/Ulnar Nerves assessed in isolation without deficit  - SILT throughout  - Compartments soft  - Radial artery palpated   - Capillary Refill <3s    RLE:  - Skin intact throughout, no open wounds  - No swelling  - No ecchymosis, erythema, or signs of cellulitis  - NonTTP throughout  - AROM and PROM of the hip, knee, ankle, and foot intact without pain  - TA/EHL/Gastroc/FHL assessed in isolation without deficit  - SILT throughout  - Compartments soft  - DP and PT palpated  - Capillary Refill <3s  - Negative Log [facility]  - Negative [facility]    LLE:  - Skin intact throughout, no open wounds  - No swelling  - No ecchymosis, erythema, or signs of cellulitis  - NonTTP throughout  - AROM and PROM of the hip, knee, ankle, and foot intact without pain  - TA/EHL/Gastroc/FHL assessed in isolation without deficit  - SILT throughout  - Compartments soft  - DP and PT palpated  - Capillary Refill <3s  - Negative [facility]  - Negative [facility]    Spine/pelvis/axial body:  No tenderness to palpation of cervical, thoracic, or lumbar spine  No pain with compression of pelvis  No chest wall or abdominal tenderness      Significant Labs: CBC:   Recent Labs   Lab 11/18/22  1236   WBC 7.62   HGB 8.6*   HCT 27.4*        CMP:   Recent Labs   Lab 11/18/22  1236      K 4.2      CO2 29   GLU 95   BUN 34*   CREATININE 0.9   CALCIUM 9.7   PROT 6.7   ALBUMIN 2.7*   BILITOT 0.5   ALKPHOS 62   AST 29   ALT 39   ANIONGAP 8     All pertinent labs within the past 24 hours have been reviewed.    Significant Imaging: I have reviewed and interpreted all pertinent imaging results/findings.    Anterior dislocation L shoulder    Assessment/Plan:     Traumatic anterior dislocation of left shoulder  Nettie Villasenor is a 82 y.o. female with PMH of mental disability, breast cancer, osteopenia, and schizophrenia who [place] [facility] [place] 3 [place], with  anterior dislocation of L shoulder, multiple L rib fractures, and L hydropneumothorax. Closed, NVI. No known prior injury/surgery to L shoulder.    - Closed reduced in ED after multiple attempts. L shoulder remains unstable  - Sling and swathe at all times  - NWB LUE  - Pain control per primary  - DVTppx per primary  - Admitted to gen surg for trauma      Procedure Note: L shoulder reduction under conscious sedation  All risks, benefits, and alternatives to treatment explained to patient. Verbalized consent to proceed was given by patient. Time out was performed and patient name, , site, and procedure were confirmed. Patient was sedated by ER staff physician. L shoulder was reduced and sling and swathe was applied in typical fashion. Post-reduction films were performed and confirmed adequate reduction. Patient tolerated procedure well. No complications from sedation were encountered by the ED staff physician during the procedure.       Jeffry Garnett MD  Orthopedics  [facility] - Emergency Dept

## 2022-11-19 NOTE — NURSING
Patient arrived from the ED with, caregiver. A&Ox3. Arm in sling and ace bandage. Vital signs within normal limits. Refused to place on oxygen, sat at 96% on room air. Will continue to monitor. Bed locked in the lowest position, call bell within reach side rail ups,alarm in place.                                                                                           &

## 2022-11-19 NOTE — TERTIARY TRAUMA SURVEY NOTE
TRAUMA TERTIARY EXAM   Admit Date & Time: 11/18/2022 10:31 AM   Date & Time of Exam: 11/19/2022, 8:51 AM   Mental Status Adequate for Exam: Yes   Examiner: Jeannie Otto MD  Primary Team: General Surgery     HPI: Nettie Villasenor  is a 82 y.o. female with a pmhx of mental disability, breast cancer, osteopenia, and schizophrenia who [place] [facility] [place] home, [facility]. On imaging, the patient was found to have a anterior dislocation of her left shoulder, as well as, a dislocated fracture of her L 3rd rib and a minimally displaced fracture of her L 2nd rib w/ associated L hydropneumothorax. In the ED, the patient is stable and in no acute distress. She was placed on a non rebreather and satting in the high 90s. Her vitals are stable. L shoulder to be reduced in the ED under conscious sedation. General surgery consulted to evaluate after poly trauma.      Vital Signs:   Vitals:    11/19/22 0734   BP:    Pulse: 91   Resp: 18   Temp:        Neurologic: Alert and oriented x3. Reflexes and motor strength normal and symmetric. Cranial nerves 2-12 and sensation grossly intact.   Glascow Coma Scale:   Motor 6 - Follows simple motor commands   Verbal 5 - Alert and oriented   Eye opening 4 - Opens eyes on own   TOTAL 15     HEENT   Head/Face: normocephalic, atraumatic  Eyes: conjunctivae/corneas clear. PERRL, EOM's intact.   Ears: normal   Nose/sinus:Nares normal. Septum midline. Mucosa normal. No drainage or sinus tenderness.  Throat/Oropharynx: mucous membranes moist, pharynx normal without lesions.     Neck: No cervical spine bony tenderness, crepitance, or stepoff, No nuchal rigidity, Normal range of motion. Thyroid nodule present.     Chest: TTP over 2-3 L ribs. No paradoxical chest motion. Equal chest rise.     Pulmonary: equal chest rise, saturating well on room air, no increased work of breathing.     Cardiovascular   Heart:normal rate and regular rhythm   Peripheral vascular: 2+ and  symmetric    Gastrointestinal   Abdominal: abdomen is soft without significant tenderness, masses, organomegaly or guarding   Rectal:not performed    Genitourinary: Normal     Musculoskeletal:   Back: full range of motion without pain, no tenderness, no spasm, no curvature   Upper Extremities: RUE with normal strength and sensation. LUE limited due to pain. Normal sensation throughout. Ecchymosis over L shoulder and proximal LUE. Sling in place.    Lower Extremities: normal strength and sensation throughout; no deformities.      Skin: ecchymosis to LUE      Imaging Results   X-Ray Humerus 2 View Left  Anterior shoulder dislocation identified.  No definite fracture or dislocation involving the humerus.         X-Ray Elbow 2 Views Left  Visualized osseous structures appear unremarkable, with no evidence of recent fracture, lytic destructive process, or other significant abnormality identified.  No demonstrable joint effusion.  A tiny olecranon spur is incidentally observed.         X-Ray Elbow Complete Left  No fracture or dislocation.  No bone destruction identified.         X-Ray Forearm Left  DJD.  No fracture or dislocation.  No bone destruction identified         X-Ray Wrist Complete Left   DJD most significant the 1st carpometacarpal articulation.  No fracture or dislocation.  No bone destruction identified.  Chondrocalcinosis.      X-Ray Wrist Complete 3 views Left  DJD mainly affecting the 1st carpometacarpal articulation.  No definite acute fracture or dislocation.  No bone destruction identified.  Chondrocalcinosis.      X-Ray Hand 3 View Left  DJD involving the D IP joint but most significantly the 1st carpometacarpal articulation.  No acute fracture or dislocation.  No bone destruction identified.  Chondrocalcinosis.  Soft tissue swelling noted at the dorsum of the hand.      X-Ray Hand 3 View Left  Diffuse soft tissue swelling.  Correlate clinically. No acute osseous abnormality.  DJD, more severe at 1st  CMC joint of wrist.  Chondrocalcinosis also noted at the wrist.      CT Head Without Contrast  No evidence of acute intracranial hemorrhage, allowing for the limitations patient motion artifact.     CT Cervical Spine Without Contrast  1. No fracture. 2. Multilevel degenerative changes the cervical spine detailed above. 3. Left hydropneumothorax, see several report for interpretation of chest CT findings. 4. Large mass which appears to arise from the left thyroid demonstrating substantial mass effect upon the trachea.                        CT Chest Abdomen Pelvis With Contrast (xpd)   1. Acute left-sided hydropneumothorax, likely secondary to interval acute appearing displaced fracture of the anterior left 3rd rib, which was likely impacted from new acute left anterior glenohumeral dislocation.  There is also minimally displaced acute fracture of the anterior left 2nd rib.  There is associated moderate left shoulder joint effusion and soft tissue swelling of the partially imaged surrounding left shoulder soft tissues.  No findings to suggest tension pneumothorax, and no right-sided pneumothorax or pneumomediastinum. 2. No evidence of solid organ injury or acute displaced fracture seen elsewhere within the chest, abdomen or pelvis. 3. Multiple varying age rib fractures involving the right hemithorax which appear subacute/remote but new from 08/18/2022 study. 4. Markedly enlarged left thyroid nodule as described above, grossly stable.  There is continued mass effect on the larynx, trachea and esophagus with rightward displacement.  This is been present since at least 03/18/2003. 5. 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 6. Right renal nonobstructing nephrolithiasis.  There is mild to moderate left-sided hydronephrosis containing large staghorn type calculi within the mid to lower pole renal pelvis but no definite stone at the ureteropelvic junction or significant left  ureteral dilatation, which could reflect sequela of a non radiodense stone or stricture at the UPJ.  Clinical correlation advised.  Further evaluation/follow-up as warranted. 7. Stable 1.2 cm hyperdense nodule arising from the left upper renal pole likely hemorrhagic or proteinaceous cyst. 8. Left adrenal 2.6 cm nodule which remains incompletely characterized.  Further evaluation with elective/nonemergent CT or MRI of the abdomen with adrenal mass protocol can be obtained as warranted. 9. Diverticulosis coli without acute diverticulitis. 10. Suspected uterine fibroids the largest of which appears partially calcified. 11. Several stable small right apical pulmonary nodules. 12. Grossly stable partially calcified soft tissue nodule within the left breast likely representing postsurgical change from prior lumpectomy. 13. Additional findings as above.      X-Ray Shoulder Trauma Left  Anterior dislocation at the left shoulder identified as before.  DJD.  Irregular glenoid identified similar to the previous study.  Several calcifications also identified adjacent to the humeral head..  CT scan would be helpful for further more definite evaluation      X-Ray Shoulder Trauma 3 View Bilateral  DJD and rotator cuff pathology of the right shoulder. Anterior dislocation left shoulder.     Assessment/Plan:   Patient with (now reduced) anterior dislocation of the L shoulder and displaced fx of the L 2-3 ribs. No additional injuries noted on tertiary exam today.      - saturating well on RA, pneumothorax stable  - LUE in sling, continue per ortho     11/19/2022, 8:51 AM

## 2022-11-19 NOTE — ASSESSMENT & PLAN NOTE
Nettie Villasenor is a 82 year old female with a pmhx of mental disability, breast cancer, osteopenia, and schizophrenia who is [place] [facility] [place] a [place] at her nursing home, [facility]. imaging showing anterior dislocation of the left shoulder and surrounding soft tissue swelling as well as L 2-3rd rib fxs with associated L sided hydropneumothroax. No other fractures found on imaging (see above).     -- orthopedics consulted for shoulder reduction in ED, appreciate assistance  -- MM pain control   -- prn nausea control   -- regular diet   -- aggressive pulmonary toilet       Dispo: possible discharge today, will speak with SW to assure patient has adequate therapy options at [facility]. Will refer to endocrine surgery to work up thyroid nodule.

## 2022-11-19 NOTE — SUBJECTIVE & OBJECTIVE
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    Osteopenia 3/13/2013    Schizophrenia 3/13/2013    Thyroid nodule        Past Surgical History:   Procedure Laterality Date    BREAST BIOPSY Left     BREAST LUMPECTOMY      LUMPECTOMY,BREAST,WITH RADIOACTIVE SEED LOCALIZATION Left 09/04/2020    Procedure: LUMPECTOMY,BREAST,WITH RADIOACTIVE SEED LOCALIZATION reflector placed on 9/2/20;  Surgeon: Los Benites MD;  Location: [facility];  Service: General;  Laterality: Left;    ORIF left lower leg      TONSILLECTOMY         Review of patient's allergies indicates:  No Known Allergies    Current Facility-Administered Medications   Medication    LIDOcaine (PF) 10 mg/ml (1%) injection 100 mg    propofol (DIPRIVAN) 10 mg/mL IVP    sodium chloride 0.9% bolus 1,000 mL     Current Outpatient Medications   Medication Sig    acetaminophen (TYLENOL) 500 MG tablet Take 1,000 mg by mouth every evening.    alendronate (FOSAMAX) 70 MG tablet TAKE 1 TABLET BY MOUTH EVERY 7 DAYS    anastrozole (ARIMIDEX) 1 mg Tab Take 1 tablet (1 mg total) by mouth once daily.    ARIPiprazole (ABILIFY) 2 MG Tab TAKE 1 TABLET BY MOUTH EVERY EVENING at bedtime    aspirin (ECOTRIN) 81 MG EC tablet TAKE 1 TABLET BY MOUTH daily TO PREVENT CLOTS    aspirin 81 MG Chew Take 81 mg by mouth once daily.    calcium carbonate (OS-OTTONIEL) 500 mg calcium (1,250 mg) tablet Take 1 tablet by mouth 2 (two) times daily.    COVID-19 vacc,mRNA,Moderna,/PF (MODERNA COVID-19 VACCINE, EUA, IM) ADMINISTER 0.5ML IN THE MUSCLE AS DIRECTED    docusate sodium (COLACE) 100 MG capsule Take 100 mg by mouth once daily.    fexofenadine (ALLEGRA) 180 MG tablet Take 180 mg by mouth daily as needed.    FLUoxetine 20 MG capsule TAKE 1 CAPSULE BY MOUTH every morning    HYDROcodone-acetaminophen (NORCO) 5-325 mg per tablet Take 1 tablet by mouth every 6 (six) hours as needed for Pain.    lorazepam  "(ATIVAN) 0.5 MG tablet Take 0.5 mg by mouth every 12 (twelve) hours as needed.    multivitamin capsule Take 1 capsule by mouth once daily.    vitamin D 1000 units Tab Take 185 mg by mouth once daily.     Facility-Administered Medications Ordered in Other Encounters   Medication    0.9%  NaCl infusion    fentaNYL injection 25 mcg    midazolam (VERSED) 1 mg/mL injection 0.5 mg     Family History       Problem Relation (Age of Onset)    Dementia Mother    Heart attack Father          Tobacco Use    Smoking status: Never    Smokeless tobacco: Never   Substance and Sexual Activity    Alcohol use: No    Drug use: No    Sexual activity: Never     ROS  Constitutional: negative for fevers  Eyes: negative visual changes  ENT: negative for hearing loss  Respiratory: negative for dyspnea  Cardiovascular: negative for chest pain  Gastrointestinal: negative for abdominal pain  Genitourinary: negative for dysuria  Neurological: negative for headaches  Behavioral/Psych: negative for hallucinations  Endocrine: negative for temperature intolerance     Objective:     Vital Signs (Most Recent):  Temp: 97.2 °F (36.2 °C) (11/18/22 0940)  Pulse: 90 (11/18/22 1900)  Resp: 18 (11/18/22 1900)  BP: (!) 141/66 (11/18/22 1900)  SpO2: 100 % (11/18/22 1900)   Vital Signs (24h Range):  Temp:  [97.2 °F (36.2 °C)] 97.2 °F (36.2 °C)  Pulse:  [] 90  Resp:  [14-20] 18  SpO2:  [97 %-100 %] 100 %  BP: (109-159)/(56-91) 141/66     Weight: 68 kg (150 lb)  Height: 5' 2" (157.5 cm)  Body mass index is 27.44 kg/m².    No intake or output data in the 24 hours ending 11/18/22 2027    Ortho/SPM Exam  General:  no acute distress, appears stated age   Neuro: alert and oriented x3  Psych: normal mood  Head: normocephalic, atraumatic.  Eyes: no scleral icterus  Mouth: moist mucous membranes  Cardiovascular: extremities warm and well perfused  Lungs: breathing comfortably, equal chest rise bilat  Skin: clean, dry, intact (any exceptions noted in below " musculoskeletal exam)    MSK:  RUE:  - Skin intact throughout, no open wounds  - No swelling  - No ecchymosis, erythema, or signs of cellulitis  - NonTTP throughout  - AROM and PROM of the shoulder, elbow, wrist, and hand intact without pain  - Axillary/AIN/PIN/Radial/Median/Ulnar Nerves assessed in isolation without deficit  - SILT throughout  - Compartments soft  - Radial artery palpated   - Capillary Refill <3s    LUE:  - Skin intact throughout, no open wounds  - 3+ pitting edema entire LUE   - Extensive ecchymosis  - TTP L shoulder/proximal humerus  - ROM L shoulder limited 2/2 pain  - AROM and PROM of the elbow, wrist, and hand intact without pain  - Axillary/AIN/PIN/Radial/Median/Ulnar Nerves assessed in isolation without deficit  - SILT throughout  - Compartments soft  - Radial artery palpated   - Capillary Refill <3s    RLE:  - Skin intact throughout, no open wounds  - No swelling  - No ecchymosis, erythema, or signs of cellulitis  - NonTTP throughout  - AROM and PROM of the hip, knee, ankle, and foot intact without pain  - TA/EHL/Gastroc/FHL assessed in isolation without deficit  - SILT throughout  - Compartments soft  - DP and PT palpated  - Capillary Refill <3s  - Negative Log roll  - Negative [facility]    LLE:  - Skin intact throughout, no open wounds  - No swelling  - No ecchymosis, erythema, or signs of cellulitis  - NonTTP throughout  - AROM and PROM of the hip, knee, ankle, and foot intact without pain  - TA/EHL/Gastroc/FHL assessed in isolation without deficit  - SILT throughout  - Compartments soft  - DP and PT palpated  - Capillary Refill <3s  - Negative Log roll  - Negative [facility]    Spine/pelvis/axial body:  No tenderness to palpation of cervical, thoracic, or lumbar spine  No pain with compression of pelvis  No chest wall or abdominal tenderness      Significant Labs: CBC:   Recent Labs   Lab 11/18/22  1236   WBC 7.62   HGB 8.6*   HCT 27.4*        CMP:   Recent Labs   Lab  11/18/22  1236      K 4.2      CO2 29   GLU 95   BUN 34*   CREATININE 0.9   CALCIUM 9.7   PROT 6.7   ALBUMIN 2.7*   BILITOT 0.5   ALKPHOS 62   AST 29   ALT 39   ANIONGAP 8     All pertinent labs within the past 24 hours have been reviewed.    Significant Imaging: I have reviewed and interpreted all pertinent imaging results/findings.    Anterior dislocation L shoulder

## 2022-11-19 NOTE — NURSING
Ortho resident in room- sling removed replaced with new sling and swath- pt left shoulder/upper left back right arm/wrist hand edematous bruising noted pt denies any  pain numbness/tingling or other deficits-

## 2022-11-19 NOTE — H&P
Please see consult note dated 11/18/22.     Jeannie Otto MD  Pager: [phone]  General Surgery PGY-II  [facility] luck descend on your side and happiness surround you through each day.

## 2022-11-19 NOTE — PLAN OF CARE
[facility] - [facility]    HOME HEALTH ORDERS  FACE TO FACE ENCOUNTER    Patient Name: Nettie Villasenor  YOB: 1940    PCP: Carolina Chavez MD   PCP Address: [address] / [place] [address]  PCP Phone Number: [phone]  PCP Fax: [phone]    Encounter Date: 11/19/2022    Admit to Home Health    Diagnoses:  Active Hospital Problems    Diagnosis  POA    *Fall [W19.XXXA]  Yes    Traumatic anterior dislocation of left shoulder [S43.015A]  Yes    Thyroid nodule [E04.1]  Yes      Resolved Hospital Problems   No resolved problems to display.       Future Appointments   Date Time Provider Department Center   11/21/2022  3:40 PM William Costello MD [facility] [place]           I have seen and examined this patient face to face today. My clinical findings that support the need for the home health skilled services and home bound status are the following:  Weakness/numbness causing balance and gait disturbance due to polytrauma making it taxing to leave home.    Allergies:Review of patient's allergies indicates:  No Known Allergies    Diet: regular diet    Activities: activity as tolerated    Nursing:   SN to complete comprehensive assessment including routine vital signs. Instruct on disease process and s/s of complications to report to MD. Review/verify medication list sent home with the patient at time of discharge  and instruct patient/caregiver as needed. Frequency may be adjusted depending on start of care date. If patient has enteral feeding tube (NG, PEG, J-tube, G-tube), flush tube before and after feeding and/or medication administration with 20-30 mL of water.    Notify MD if SBP > 160 or < 90; DBP > 90 or < 50; HR > 120 or < 50; Temp > 101; Other:          CONSULTS:    Physical Therapy to evaluate and treat. Evaluate for home safety and equipment needs; Establish/upgrade home exercise program. Perform / instruct on therapeutic exercises, gait training, transfer training, and Range  of Motion.  Occupational Therapy to evaluate and treat. Evaluate home environment for safety and equipment needs. Perform/Instruct on transfers, ADL training, ROM, and therapeutic exercises.    MISCELLANEOUS CARE:  N/A    WOUND CARE ORDERS  no      Medications: Review discharge medications with patient and family and provide education.      Current Discharge Medication List        START taking these medications    Details   ketorolac (TORADOL) 10 mg tablet Take 1 tablet (10 mg total) by mouth every 6 (six) hours. for 5 days  Qty: 20 tablet, Refills: 0      oxyCODONE (ROXICODONE) 5 MG immediate release tablet Take 1 tablet (5 mg total) by mouth every 6 (six) hours as needed for Pain.  Qty: 15 tablet, Refills: 0    Comments: Quantity prescribed more than 7 day supply? No           CONTINUE these medications which have NOT CHANGED    Details   acetaminophen (TYLENOL) 500 MG tablet Take 1,000 mg by mouth every evening.      alendronate (FOSAMAX) 70 MG tablet TAKE 1 TABLET BY MOUTH EVERY 7 DAYS  Qty: 4 tablet, Refills: 3    Comments: This prescription was filled on 6/21/2021. Any refills authorized will be placed on file.      anastrozole (ARIMIDEX) 1 mg Tab Take 1 tablet (1 mg total) by mouth once daily.  Qty: 90 tablet, Refills: 3    Associated Diagnoses: Infiltrating ductal carcinoma of left breast      ARIPiprazole (ABILIFY) 2 MG Tab TAKE 1 TABLET BY MOUTH EVERY EVENING at bedtime  Qty: 30 tablet, Refills: 11    Comments: This prescription was filled on 12/29/2021. Any refills authorized will be placed on file.  Associated Diagnoses: Mood disorder; Anxiety      aspirin (ECOTRIN) 81 MG EC tablet TAKE 1 TABLET BY MOUTH daily TO PREVENT CLOTS  Qty: 30 tablet, Refills: 11    Comments: This prescription was filled on 7/6/2020. Any refills authorized will be placed on file.      aspirin 81 MG Chew Take 81 mg by mouth once daily.      docusate sodium (COLACE) 100 MG capsule Take 100 mg by mouth once daily.      fexofenadine  (ALLEGRA) 180 MG tablet Take 180 mg by mouth daily as needed.      FLUoxetine 20 MG capsule TAKE 1 CAPSULE BY MOUTH every morning  Qty: 30 capsule, Refills: 11    Comments: This prescription was filled on 12/29/2021. Any refills authorized will be placed on file.  Associated Diagnoses: Mood disorder; Anxiety      lorazepam (ATIVAN) 0.5 MG tablet Take 0.5 mg by mouth every 12 (twelve) hours as needed.      multivitamin capsule Take 1 capsule by mouth once daily.      vitamin D 1000 units Tab Take 185 mg by mouth once daily.      calcium carbonate (OS-OTTONIEL) 500 mg calcium (1,250 mg) tablet Take 1 tablet by mouth 2 (two) times daily.      COVID-19 vacc,mRNA,Moderna,/PF (MODERNA COVID-19 VACCINE, EUA, IM) ADMINISTER 0.5ML IN THE MUSCLE AS DIRECTED      HYDROcodone-acetaminophen (NORCO) 5-325 mg per tablet Take 1 tablet by mouth every 6 (six) hours as needed for Pain.  Qty: 21 tablet, Refills: 0    Comments: Quantity prescribed more than 7 day supply? No             I certify that this patient is confined to her home and needs physical therapy and occupational therapy.

## 2022-11-19 NOTE — SUBJECTIVE & OBJECTIVE
Interval History:   No acute events overnight. Pain well controlled. L should reduced in ED, sling in place. Satting well on RA.     Medications:  Continuous Infusions:   lactated ringers Stopped (11/18/22 2030)     Scheduled Meds:   acetaminophen  650 mg Oral Q6H    ARIPiprazole  2 mg Oral QHS    docusate sodium  100 mg Oral Daily    enoxaparin  30 mg Subcutaneous Q12H    FLUoxetine  20 mg Oral QAM    ketorolac  15 mg Intravenous Q6H     PRN Meds:acetaminophen, HYDROmorphone, levalbuterol, LIDOcaine (PF) 10 mg/ml (1%), melatonin, ondansetron, oxyCODONE, oxyCODONE, sodium chloride 0.9%     Review of patient's allergies indicates:  No Known Allergies  Objective:     Vital Signs (Most Recent):  Temp: 97.4 °F (36.3 °C) (11/19/22 0058)  Pulse: 91 (11/19/22 0734)  Resp: 18 (11/19/22 0734)  BP: 102/62 (11/19/22 0058)  SpO2: 96 % (11/19/22 0734) Vital Signs (24h Range):  Temp:  [97.2 °F (36.2 °C)-98.2 °F (36.8 °C)] 97.4 °F (36.3 °C)  Pulse:  [] 91  Resp:  [14-20] 18  SpO2:  [93 %-100 %] 96 %  BP: (102-159)/(56-91) 102/62     Weight: 66.7 kg (147 lb)  Body mass index is 26.89 kg/m².    Intake/Output - Last 3 Shifts       None            Physical Exam  Vitals and nursing note reviewed.   Constitutional:       General: She is not in acute distress.     Appearance: Normal appearance.   HENT:      Head: Normocephalic and atraumatic.      Nose: Nose normal.      Mouth/Throat:      Mouth: Mucous membranes are dry.   Eyes:      Pupils: Pupils are equal, round, and reactive to light.   Cardiovascular:      Rate and Rhythm: Normal rate.      Pulses: Normal pulses.   Pulmonary:      Effort: Pulmonary effort is normal. No respiratory distress.      Breath sounds: No wheezing.      Comments: Room air; L chest TTP; no paradoxical chest wall movements  Abdominal:      General: Abdomen is flat. There is no distension.      Palpations: Abdomen is soft.      Tenderness: There is no abdominal tenderness. There is no guarding or  rebound.   Musculoskeletal:         General: Swelling, tenderness, deformity and signs of injury present.      Cervical back: Normal range of motion.      Comments: Left shoulder now reduced, sling in place; ecchymosis to LUE and soft tissue swelling    Skin:     General: Skin is warm.   Neurological:      Mental Status: She is alert. Mental status is at baseline.   Psychiatric:         Mood and Affect: Mood normal.         Behavior: Behavior normal.       Significant Labs:  I have reviewed all pertinent lab results within the past 24 hours.  CBC:   Recent Labs   Lab 11/19/22 0614   WBC 8.37   RBC 2.21*   HGB 7.3*   HCT 23.9*      *   MCH 33.0*   MCHC 30.5*     CMP:   Recent Labs   Lab 11/19/22 0614   *   CALCIUM 8.7   ALBUMIN 2.4*   PROT 5.5*   *   K 4.7   CO2 25   *   BUN 42*   CREATININE 1.1   ALKPHOS 52*   ALT 32   AST 35   BILITOT 0.7       Significant Diagnostics:  I have reviewed all pertinent imaging results/findings within the past 24 hours.  CXR with stable L ptx

## 2022-11-19 NOTE — HPI
Nettie Villasenor  is a 82 y.o. female with a pmhx of mental disability, breast cancer, osteopenia, and schizophrenia who is presenting to [facility] after a fall at her nursing home, [facility]. On imaging, the patient was found to have a anterior dislocation of her left shoulder, as well as, a dislocated fracture of her L 3rd rib and a minimally displaced fracture of her L 2nd rib w/ associated L hydropneumothorax. In the ED, the patient is stable and in no acute distress. She was placed on a non rebreather and satting in the high 90s. Her vitals are stable. Ortho consulted after attempted L shoulder reduction was unsuccessful.

## 2022-11-20 PROBLEM — S43.005A: Status: ACTIVE | Noted: 2022-11-20

## 2022-11-20 LAB
ABO + RH BLD: NORMAL
ALBUMIN SERPL BCP-MCNC: 2.3 G/DL (ref 3.5–5.2)
ALP SERPL-CCNC: 53 U/L (ref 55–135)
ALT SERPL W/O P-5'-P-CCNC: 39 U/L (ref 10–44)
ANION GAP SERPL CALC-SCNC: 11 MMOL/L (ref 8–16)
AST SERPL-CCNC: 59 U/L (ref 10–40)
BASOPHILS # BLD AUTO: 0.03 K/UL (ref 0–0.2)
BASOPHILS NFR BLD: 0.3 % (ref 0–1.9)
BILIRUB SERPL-MCNC: 0.6 MG/DL (ref 0.1–1)
BLD GP AB SCN CELLS X3 SERPL QL: NORMAL
BLD PROD TYP BPU: NORMAL
BLOOD UNIT EXPIRATION DATE: NORMAL
BLOOD UNIT TYPE CODE: 5100
BLOOD UNIT TYPE: NORMAL
BUN SERPL-MCNC: 57 MG/DL (ref 8–23)
CALCIUM SERPL-MCNC: 8.7 MG/DL (ref 8.7–10.5)
CHLORIDE SERPL-SCNC: 112 MMOL/L (ref 95–110)
CO2 SERPL-SCNC: 22 MMOL/L (ref 23–29)
CODING SYSTEM: NORMAL
CREAT SERPL-MCNC: 1.2 MG/DL (ref 0.5–1.4)
DIFFERENTIAL METHOD: ABNORMAL
DISPENSE STATUS: NORMAL
EOSINOPHIL # BLD AUTO: 0.2 K/UL (ref 0–0.5)
EOSINOPHIL NFR BLD: 2.1 % (ref 0–8)
ERYTHROCYTE [DISTWIDTH] IN BLOOD BY AUTOMATED COUNT: 13.2 % (ref 11.5–14.5)
EST. GFR  (NO RACE VARIABLE): 44.9 ML/MIN/1.73 M^2
GLUCOSE SERPL-MCNC: 101 MG/DL (ref 70–110)
HCT VFR BLD AUTO: 20.3 % (ref 37–48.5)
HGB BLD-MCNC: 6.3 G/DL (ref 12–16)
IMM GRANULOCYTES # BLD AUTO: 0.04 K/UL (ref 0–0.04)
IMM GRANULOCYTES NFR BLD AUTO: 0.4 % (ref 0–0.5)
LYMPHOCYTES # BLD AUTO: 1.8 K/UL (ref 1–4.8)
LYMPHOCYTES NFR BLD: 19 % (ref 18–48)
MAGNESIUM SERPL-MCNC: 2.1 MG/DL (ref 1.6–2.6)
MCH RBC QN AUTO: 33.2 PG (ref 27–31)
MCHC RBC AUTO-ENTMCNC: 31 G/DL (ref 32–36)
MCV RBC AUTO: 107 FL (ref 82–98)
MONOCYTES # BLD AUTO: 0.6 K/UL (ref 0.3–1)
MONOCYTES NFR BLD: 6.4 % (ref 4–15)
NEUTROPHILS # BLD AUTO: 7 K/UL (ref 1.8–7.7)
NEUTROPHILS NFR BLD: 71.8 % (ref 38–73)
NRBC BLD-RTO: 0 /100 WBC
PHOSPHATE SERPL-MCNC: 3.8 MG/DL (ref 2.7–4.5)
PLATELET # BLD AUTO: 278 K/UL (ref 150–450)
PMV BLD AUTO: 12.5 FL (ref 9.2–12.9)
POTASSIUM SERPL-SCNC: 4 MMOL/L (ref 3.5–5.1)
PROT SERPL-MCNC: 5.4 G/DL (ref 6–8.4)
RBC # BLD AUTO: 1.9 M/UL (ref 4–5.4)
SODIUM SERPL-SCNC: 145 MMOL/L (ref 136–145)
TRANS ERYTHROCYTES VOL PATIENT: NORMAL ML
WBC # BLD AUTO: 9.69 K/UL (ref 3.9–12.7)

## 2022-11-20 PROCEDURE — 25000003 PHARM REV CODE 250

## 2022-11-20 PROCEDURE — 86920 COMPATIBILITY TEST SPIN: CPT

## 2022-11-20 PROCEDURE — 99222 1ST HOSP IP/OBS MODERATE 55: CPT | Mod: ,,, | Performed by: ORTHOPAEDIC SURGERY

## 2022-11-20 PROCEDURE — 80053 COMPREHEN METABOLIC PANEL: CPT

## 2022-11-20 PROCEDURE — 83735 ASSAY OF MAGNESIUM: CPT

## 2022-11-20 PROCEDURE — 85025 COMPLETE CBC W/AUTO DIFF WBC: CPT

## 2022-11-20 PROCEDURE — P9021 RED BLOOD CELLS UNIT: HCPCS

## 2022-11-20 PROCEDURE — 99222 PR INITIAL HOSPITAL CARE,LEVL II: ICD-10-PCS | Mod: ,,, | Performed by: ORTHOPAEDIC SURGERY

## 2022-11-20 PROCEDURE — 36415 COLL VENOUS BLD VENIPUNCTURE: CPT

## 2022-11-20 PROCEDURE — 99233 PR SUBSEQUENT HOSPITAL CARE,LEVL III: ICD-10-PCS | Mod: ,,, | Performed by: STUDENT IN AN ORGANIZED HEALTH CARE EDUCATION/TRAINING PROGRAM

## 2022-11-20 PROCEDURE — 63600175 PHARM REV CODE 636 W HCPCS

## 2022-11-20 PROCEDURE — 84100 ASSAY OF PHOSPHORUS: CPT

## 2022-11-20 PROCEDURE — 86901 BLOOD TYPING SEROLOGIC RH(D): CPT

## 2022-11-20 PROCEDURE — 11000001 HC ACUTE MED/SURG PRIVATE ROOM

## 2022-11-20 PROCEDURE — 63600175 PHARM REV CODE 636 W HCPCS: Performed by: STUDENT IN AN ORGANIZED HEALTH CARE EDUCATION/TRAINING PROGRAM

## 2022-11-20 PROCEDURE — 36415 COLL VENOUS BLD VENIPUNCTURE: CPT | Performed by: STUDENT IN AN ORGANIZED HEALTH CARE EDUCATION/TRAINING PROGRAM

## 2022-11-20 PROCEDURE — 36430 TRANSFUSION BLD/BLD COMPNT: CPT

## 2022-11-20 PROCEDURE — 99233 SBSQ HOSP IP/OBS HIGH 50: CPT | Mod: ,,, | Performed by: STUDENT IN AN ORGANIZED HEALTH CARE EDUCATION/TRAINING PROGRAM

## 2022-11-20 PROCEDURE — 25000003 PHARM REV CODE 250: Performed by: STUDENT IN AN ORGANIZED HEALTH CARE EDUCATION/TRAINING PROGRAM

## 2022-11-20 RX ORDER — HYDROCODONE BITARTRATE AND ACETAMINOPHEN 500; 5 MG/1; MG/1
TABLET ORAL
Status: DISCONTINUED | OUTPATIENT
Start: 2022-11-20 | End: 2022-11-21 | Stop reason: HOSPADM

## 2022-11-20 RX ADMIN — ACETAMINOPHEN 650 MG: 325 TABLET ORAL at 12:11

## 2022-11-20 RX ADMIN — SODIUM CHLORIDE, SODIUM LACTATE, POTASSIUM CHLORIDE, AND CALCIUM CHLORIDE: .6; .31; .03; .02 INJECTION, SOLUTION INTRAVENOUS at 06:11

## 2022-11-20 RX ADMIN — ACETAMINOPHEN 650 MG: 325 TABLET ORAL at 06:11

## 2022-11-20 RX ADMIN — FLUOXETINE 20 MG: 20 CAPSULE ORAL at 05:11

## 2022-11-20 RX ADMIN — ENOXAPARIN SODIUM 30 MG: 30 INJECTION SUBCUTANEOUS at 09:11

## 2022-11-20 RX ADMIN — DOCUSATE SODIUM 100 MG: 100 CAPSULE ORAL at 09:11

## 2022-11-20 RX ADMIN — KETOROLAC TROMETHAMINE 15 MG: 30 INJECTION, SOLUTION INTRAMUSCULAR; INTRAVENOUS at 12:11

## 2022-11-20 RX ADMIN — KETOROLAC TROMETHAMINE 15 MG: 30 INJECTION, SOLUTION INTRAMUSCULAR; INTRAVENOUS at 06:11

## 2022-11-20 RX ADMIN — ARIPIPRAZOLE 2 MG: 2 TABLET ORAL at 09:11

## 2022-11-20 NOTE — SUBJECTIVE & OBJECTIVE
Interval History:     No acute events, HDS. Pain controlled. Hb downtrending to 6.3 from 8.6 on presentation, no stigmata of bleeding. NOK consented for transfusion this morning. CXR stable.     Medications:  Continuous Infusions:   lactated ringers Stopped (11/18/22 2030)     Scheduled Meds:   acetaminophen  650 mg Oral Q6H    ARIPiprazole  2 mg Oral QHS    docusate sodium  100 mg Oral Daily    enoxaparin  30 mg Subcutaneous Q12H    FLUoxetine  20 mg Oral QAM    ketorolac  15 mg Intravenous Q6H     PRN Meds:acetaminophen, HYDROmorphone, levalbuterol, LIDOcaine (PF) 10 mg/ml (1%), melatonin, ondansetron, oxyCODONE, oxyCODONE, sodium chloride 0.9%     Review of patient's allergies indicates:  No Known Allergies  Objective:     Vital Signs (Most Recent):  Temp: 98.1 °F (36.7 °C) (11/20/22 0829)  Pulse: 97 (11/20/22 0829)  Resp: 16 (11/20/22 0829)  BP: (!) 122/58 (11/20/22 0829)  SpO2: (!) 92 % (11/20/22 0829)   Vital Signs (24h Range):  Temp:  [97.4 °F (36.3 °C)-98.6 °F (37 °C)] 98.1 °F (36.7 °C)  Pulse:  [] 97  Resp:  [16-18] 16  SpO2:  [92 %-96 %] 92 %  BP: ()/(53-60) 122/58     Weight: 66.7 kg (147 lb)  Body mass index is 26.89 kg/m².    Intake/Output - Last 3 Shifts         11/18 0700 11/19 0659 11/19 0700 11/20 0659 11/20 0700 11/21 0659    P.O.   260    Total Intake(mL/kg)   260 (3.9)    Urine (mL/kg/hr)   600 (3.4)    Stool   0    Total Output   600    Net   -340           Stool Occurrence   0 x            Physical Exam    Significant Labs:  I have reviewed all pertinent lab results within the past 24 hours.  CBC:   Recent Labs   Lab 11/20/22  0528   WBC 9.69   RBC 1.90*   HGB 6.3*   HCT 20.3*      *   MCH 33.2*   MCHC 31.0*     CMP:   Recent Labs   Lab 11/20/22  0528      CALCIUM 8.7   ALBUMIN 2.3*   PROT 5.4*      K 4.0   CO2 22*   *   BUN 57*   CREATININE 1.2   ALKPHOS 53*   ALT 39   AST 59*   BILITOT 0.6     LFTs:   Recent Labs   Lab 11/20/22  0528   ALT 39    AST 59*   ALKPHOS 53*   BILITOT 0.6   PROT 5.4*   ALBUMIN 2.3*       Significant Diagnostics:  I have reviewed all pertinent imaging results/findings within the past 24 hours.  CXR: I have reviewed all pertinent results/findings within the past 24 hours and my personal findings are:  Stable right tracheal deviation, no signficant PTX apparent.

## 2022-11-20 NOTE — NURSING
Received call from lab, needs ABO , she will place order and once resulted will send blood    1335 spoke with jorge l regarding above, she will call lab    1457 released RBC 1 unit, blood bank stated would send after ABO completed

## 2022-11-20 NOTE — PROGRESS NOTES
[facility] - [facility]  General Surgery  Progress Note    Subjective:     History of Present Illness:  Nettie Villasenor  is a 82 y.o. female with a pmhx of mental disability, breast cancer, osteopenia, and schizophrenia who is presenting to [facility] after a fall at her nursing home, [facility]. On imaging, the patient was found to have a anterior dislocation of her left shoulder, as well as, a dislocated fracture of her L 3rd rib and a minimally displaced fracture of her L 2nd rib w/ associated L hydropneumothorax. In the ED, the patient is stable and in no acute distress. She was placed on a non rebreather and satting in the high 90s. Her vitals are stable. L shoulder to be reduced in the ED under conscious sedation. General surgery consulted to evaluate after poly trauma.          Post-Op Info:  * No surgery found *         Interval History:     No acute events, HDS. Pain controlled. Hb downtrending to 6.3 from 8.6 on presentation, no stigmata of bleeding. NOK consented for transfusion this morning. CXR stable.     Medications:  Continuous Infusions:   lactated ringers Stopped (11/18/22 2030)     Scheduled Meds:   acetaminophen  650 mg Oral Q6H    ARIPiprazole  2 mg Oral QHS    docusate sodium  100 mg Oral Daily    enoxaparin  30 mg Subcutaneous Q12H    FLUoxetine  20 mg Oral QAM    ketorolac  15 mg Intravenous Q6H     PRN Meds:acetaminophen, HYDROmorphone, levalbuterol, LIDOcaine (PF) 10 mg/ml (1%), melatonin, ondansetron, oxyCODONE, oxyCODONE, sodium chloride 0.9%     Review of patient's allergies indicates:  No Known Allergies  Objective:     Vital Signs (Most Recent):  Temp: 98.1 °F (36.7 °C) (11/20/22 0829)  Pulse: 97 (11/20/22 0829)  Resp: 16 (11/20/22 0829)  BP: (!) 122/58 (11/20/22 0829)  SpO2: (!) 92 % (11/20/22 0829)   Vital Signs (24h Range):  Temp:  [97.4 °F (36.3 °C)-98.6 °F (37 °C)] 98.1 °F (36.7 °C)  Pulse:  [] 97  Resp:  [16-18] 16  SpO2:  [92 %-96 %] 92 %  BP: ()/(53-60) 122/58      Weight: 66.7 kg (147 lb)  Body mass index is 26.89 kg/m².    Intake/Output - Last 3 Shifts         11/18 0700 11/19 0659 11/19 0700 11/20 0659 11/20 0700 11/21 0659    P.O.   260    Total Intake(mL/kg)   260 (3.9)    Urine (mL/kg/hr)   600 (3.4)    Stool   0    Total Output   600    Net   -340           Stool Occurrence   0 x            Physical Exam    Significant Labs:  I have reviewed all pertinent lab results within the past 24 hours.  CBC:   Recent Labs   Lab 11/20/22 0528   WBC 9.69   RBC 1.90*   HGB 6.3*   HCT 20.3*      *   MCH 33.2*   MCHC 31.0*     CMP:   Recent Labs   Lab 11/20/22 0528      CALCIUM 8.7   ALBUMIN 2.3*   PROT 5.4*      K 4.0   CO2 22*   *   BUN 57*   CREATININE 1.2   ALKPHOS 53*   ALT 39   AST 59*   BILITOT 0.6     LFTs:   Recent Labs   Lab 11/20/22 0528   ALT 39   AST 59*   ALKPHOS 53*   BILITOT 0.6   PROT 5.4*   ALBUMIN 2.3*       Significant Diagnostics:  I have reviewed all pertinent imaging results/findings within the past 24 hours.  CXR: I have reviewed all pertinent results/findings within the past 24 hours and my personal findings are:  Stable right tracheal deviation, no signficant PTX apparent.     Assessment/Plan:     * Fall  Nettie Villasenor is a 82 year old female with a pmhx of mental disability, breast cancer, osteopenia, and schizophrenia who is presenting to [facility] after a fall at her nursing home, [facility]. imaging showing anterior dislocation of the left shoulder and surrounding soft tissue swelling as well as L 2-3rd rib fxs with associated L sided hydropneumothroax. No other fractures found on imaging (see above).     Stable pain and pulmonary status. Satting well on RA.     -- orthopedics consulted for shoulder reduction in ED, appreciate assistance  -- transfuse 1u PRBC for Hb 6.3, recheck tomorrow AM  -- MM pain control   -- prn nausea control   -- regular diet   -- aggressive pulmonary toilet       Dispo: anticipate  discharge back to [place] tomorrow, 11/21        Alex Ortez MD  General Surgery  [facility] - Surgery

## 2022-11-20 NOTE — NURSING
Patient LR MVF was not started since admit, spoke with Dr Otto, as she was on floor consenting with nephew for blood via telephone, VTO Dr Fam saldaña to start LR after blood transfusion.

## 2022-11-20 NOTE — ASSESSMENT & PLAN NOTE
Nettie Villasenor is a 82 year old female with a pmhx of mental disability, breast cancer, osteopenia, and schizophrenia who is presenting to [facility] after a fall at her nursing home, [facility]. imaging showing anterior dislocation of the left shoulder and surrounding soft tissue swelling as well as L 2-3rd rib fxs with associated L sided hydropneumothroax. No other fractures found on imaging (see above).     Stable pain and pulmonary status. Satting well on RA.     -- orthopedics consulted for shoulder reduction in ED, appreciate assistance  -- transfuse 1u PRBC for Hb 6.3, recheck tomorrow AM  -- MM pain control   -- prn nausea control   -- regular diet   -- aggressive pulmonary toilet       Dispo: anticipate discharge back to [place] tomorrow, 11/21

## 2022-11-21 VITALS
RESPIRATION RATE: 18 BRPM | OXYGEN SATURATION: 100 % | WEIGHT: 147 LBS | TEMPERATURE: 98 F | HEIGHT: 62 IN | BODY MASS INDEX: 27.05 KG/M2 | DIASTOLIC BLOOD PRESSURE: 64 MMHG | HEART RATE: 78 BPM | SYSTOLIC BLOOD PRESSURE: 141 MMHG

## 2022-11-21 PROBLEM — S22.42XA MULTIPLE CLOSED FRACTURES OF RIBS OF LEFT SIDE: Status: ACTIVE | Noted: 2022-11-21

## 2022-11-21 PROBLEM — J94.8 HYDROPNEUMOTHORAX: Status: ACTIVE | Noted: 2022-11-21

## 2022-11-21 LAB
ALBUMIN SERPL BCP-MCNC: 2.1 G/DL (ref 3.5–5.2)
ALP SERPL-CCNC: 56 U/L (ref 55–135)
ALT SERPL W/O P-5'-P-CCNC: 42 U/L (ref 10–44)
ANION GAP SERPL CALC-SCNC: 5 MMOL/L (ref 8–16)
AST SERPL-CCNC: 54 U/L (ref 10–40)
BASOPHILS # BLD AUTO: 0.03 K/UL (ref 0–0.2)
BASOPHILS NFR BLD: 0.4 % (ref 0–1.9)
BILIRUB SERPL-MCNC: 0.7 MG/DL (ref 0.1–1)
BUN SERPL-MCNC: 42 MG/DL (ref 8–23)
CALCIUM SERPL-MCNC: 8.4 MG/DL (ref 8.7–10.5)
CHLORIDE SERPL-SCNC: 108 MMOL/L (ref 95–110)
CO2 SERPL-SCNC: 26 MMOL/L (ref 23–29)
CREAT SERPL-MCNC: 0.8 MG/DL (ref 0.5–1.4)
DIFFERENTIAL METHOD: ABNORMAL
EOSINOPHIL # BLD AUTO: 0.3 K/UL (ref 0–0.5)
EOSINOPHIL NFR BLD: 3.3 % (ref 0–8)
ERYTHROCYTE [DISTWIDTH] IN BLOOD BY AUTOMATED COUNT: 14.2 % (ref 11.5–14.5)
EST. GFR  (NO RACE VARIABLE): >60 ML/MIN/1.73 M^2
GLUCOSE SERPL-MCNC: 90 MG/DL (ref 70–110)
HCT VFR BLD AUTO: 24.3 % (ref 37–48.5)
HGB BLD-MCNC: 7.7 G/DL (ref 12–16)
IMM GRANULOCYTES # BLD AUTO: 0.06 K/UL (ref 0–0.04)
IMM GRANULOCYTES NFR BLD AUTO: 0.7 % (ref 0–0.5)
LYMPHOCYTES # BLD AUTO: 1.5 K/UL (ref 1–4.8)
LYMPHOCYTES NFR BLD: 18.5 % (ref 18–48)
MAGNESIUM SERPL-MCNC: 1.9 MG/DL (ref 1.6–2.6)
MCH RBC QN AUTO: 32.8 PG (ref 27–31)
MCHC RBC AUTO-ENTMCNC: 31.7 G/DL (ref 32–36)
MCV RBC AUTO: 103 FL (ref 82–98)
MONOCYTES # BLD AUTO: 0.5 K/UL (ref 0.3–1)
MONOCYTES NFR BLD: 6.2 % (ref 4–15)
NEUTROPHILS # BLD AUTO: 5.8 K/UL (ref 1.8–7.7)
NEUTROPHILS NFR BLD: 70.9 % (ref 38–73)
NRBC BLD-RTO: 0 /100 WBC
PHOSPHATE SERPL-MCNC: 2.7 MG/DL (ref 2.7–4.5)
PLATELET # BLD AUTO: 263 K/UL (ref 150–450)
PMV BLD AUTO: 12.7 FL (ref 9.2–12.9)
POTASSIUM SERPL-SCNC: 3.8 MMOL/L (ref 3.5–5.1)
PROT SERPL-MCNC: 5.1 G/DL (ref 6–8.4)
RBC # BLD AUTO: 2.35 M/UL (ref 4–5.4)
SODIUM SERPL-SCNC: 139 MMOL/L (ref 136–145)
WBC # BLD AUTO: 8.23 K/UL (ref 3.9–12.7)

## 2022-11-21 PROCEDURE — 85025 COMPLETE CBC W/AUTO DIFF WBC: CPT

## 2022-11-21 PROCEDURE — 99233 SBSQ HOSP IP/OBS HIGH 50: CPT | Mod: ,,, | Performed by: STUDENT IN AN ORGANIZED HEALTH CARE EDUCATION/TRAINING PROGRAM

## 2022-11-21 PROCEDURE — 99233 PR SUBSEQUENT HOSPITAL CARE,LEVL III: ICD-10-PCS | Mod: ,,, | Performed by: STUDENT IN AN ORGANIZED HEALTH CARE EDUCATION/TRAINING PROGRAM

## 2022-11-21 PROCEDURE — 63600175 PHARM REV CODE 636 W HCPCS: Performed by: STUDENT IN AN ORGANIZED HEALTH CARE EDUCATION/TRAINING PROGRAM

## 2022-11-21 PROCEDURE — 25000003 PHARM REV CODE 250: Performed by: STUDENT IN AN ORGANIZED HEALTH CARE EDUCATION/TRAINING PROGRAM

## 2022-11-21 PROCEDURE — 80053 COMPREHEN METABOLIC PANEL: CPT

## 2022-11-21 PROCEDURE — 94761 N-INVAS EAR/PLS OXIMETRY MLT: CPT

## 2022-11-21 PROCEDURE — 36415 COLL VENOUS BLD VENIPUNCTURE: CPT

## 2022-11-21 PROCEDURE — 84100 ASSAY OF PHOSPHORUS: CPT

## 2022-11-21 PROCEDURE — 25000003 PHARM REV CODE 250

## 2022-11-21 PROCEDURE — 63600175 PHARM REV CODE 636 W HCPCS

## 2022-11-21 PROCEDURE — 83735 ASSAY OF MAGNESIUM: CPT

## 2022-11-21 RX ORDER — ASPIRIN 81 MG/1
81 TABLET ORAL DAILY
Status: DISCONTINUED | OUTPATIENT
Start: 2022-11-21 | End: 2022-11-21 | Stop reason: HOSPADM

## 2022-11-21 RX ADMIN — SODIUM CHLORIDE, SODIUM LACTATE, POTASSIUM CHLORIDE, AND CALCIUM CHLORIDE: .6; .31; .03; .02 INJECTION, SOLUTION INTRAVENOUS at 06:11

## 2022-11-21 RX ADMIN — ACETAMINOPHEN 650 MG: 325 TABLET ORAL at 01:11

## 2022-11-21 RX ADMIN — ENOXAPARIN SODIUM 30 MG: 30 INJECTION SUBCUTANEOUS at 09:11

## 2022-11-21 RX ADMIN — KETOROLAC TROMETHAMINE 15 MG: 30 INJECTION, SOLUTION INTRAMUSCULAR; INTRAVENOUS at 01:11

## 2022-11-21 RX ADMIN — ASPIRIN 81 MG: 81 TABLET, COATED ORAL at 09:11

## 2022-11-21 RX ADMIN — ACETAMINOPHEN 650 MG: 325 TABLET ORAL at 06:11

## 2022-11-21 RX ADMIN — ACETAMINOPHEN 650 MG: 325 TABLET ORAL at 12:11

## 2022-11-21 RX ADMIN — DOCUSATE SODIUM 100 MG: 100 CAPSULE ORAL at 09:11

## 2022-11-21 RX ADMIN — KETOROLAC TROMETHAMINE 15 MG: 30 INJECTION, SOLUTION INTRAMUSCULAR; INTRAVENOUS at 06:11

## 2022-11-21 RX ADMIN — FLUOXETINE 20 MG: 20 CAPSULE ORAL at 06:11

## 2022-11-21 NOTE — SUBJECTIVE & OBJECTIVE
Interval History: NAEON. Afebrile. HDS. Reports feeling good this morning. H/H responsive to 1 unit pRBC yesterday. Satting well on RA. Pain is controlled with current regimen. Adequate UOP. Tolerating diet. No new symptoms or concerns this morning. Guest at bedside. All questions answered.       Medications:  Continuous Infusions:      Scheduled Meds:   acetaminophen  650 mg Oral Q6H    ARIPiprazole  2 mg Oral QHS    docusate sodium  100 mg Oral Daily    enoxaparin  30 mg Subcutaneous Q12H    FLUoxetine  20 mg Oral QAM    ketorolac  15 mg Intravenous Q6H     PRN Meds:sodium chloride, acetaminophen, HYDROmorphone, levalbuterol, LIDOcaine (PF) 10 mg/ml (1%), melatonin, ondansetron, oxyCODONE, oxyCODONE, sodium chloride 0.9%     Review of patient's allergies indicates:  No Known Allergies  Objective:     Vital Signs (Most Recent):  Temp: 98 °F (36.7 °C) (11/21/22 0739)  Pulse: 78 (11/21/22 0739)  Resp: 18 (11/21/22 0739)  BP: (!) 141/64 (11/21/22 0739)  SpO2: 100 % (11/21/22 0739)   Vital Signs (24h Range):  Temp:  [97.3 °F (36.3 °C)-98.5 °F (36.9 °C)] 98 °F (36.7 °C)  Pulse:  [78-97] 78  Resp:  [16-19] 18  SpO2:  [91 %-100 %] 100 %  BP: (114-141)/(58-90) 141/64     Weight: 66.7 kg (147 lb)  Body mass index is 26.89 kg/m².    Intake/Output - Last 3 Shifts         11/19 0700 11/20 0659 11/20 0700 11/21 0659 11/21 0700 11/22 0659    P.O.  858     Blood  355.4     Total Intake(mL/kg)  1213.4 (18.2)     Urine (mL/kg/hr)  1000 (0.6)     Stool  0     Total Output  1000     Net  +213.4            Urine Occurrence  1 x 1 x    Stool Occurrence  1 x             Physical Exam  Vitals and nursing note reviewed.   Constitutional:       General: She is not in acute distress.     Appearance: Normal appearance.   HENT:      Head: Normocephalic and atraumatic.      Nose: Nose normal.      Mouth/Throat:      Mouth: Mucous membranes are dry.   Eyes:      EOM intact. Conjunctiva normal.   Cardiovascular:      Rate and Rhythm: Normal  rate.   Pulmonary:      Effort: Pulmonary effort is normal. No respiratory distress.      Comments: Room air; L chest TTP; no paradoxical chest wall movements  Abdominal:      General: Abdomen is flat. There is no distension.      Palpations: Abdomen is soft.      Tenderness: There is no abdominal tenderness. There is no guarding or rebound.   Musculoskeletal:         General: Swelling, tenderness, deformity and signs of injury present.      Cervical back: Normal range of motion.      Comments: Left shoulder now reduced, sling in place; ecchymosis to LUE and soft tissue swelling    Skin:     General: Skin is warm.   Neurological:      Mental Status: She is alert. Mental status is at baseline.   Psychiatric:         Mood and Affect: Mood normal.         Behavior: Behavior normal.        Significant Labs:  I have reviewed all pertinent lab results within the past 24 hours.  CBC:   Recent Labs   Lab 11/21/22 0428   WBC 8.23   RBC 2.35*   HGB 7.7*   HCT 24.3*      *   MCH 32.8*   MCHC 31.7*       CMP:   Recent Labs   Lab 11/21/22 0428   GLU 90   CALCIUM 8.4*   ALBUMIN 2.1*   PROT 5.1*      K 3.8   CO2 26      BUN 42*   CREATININE 0.8   ALKPHOS 56   ALT 42   AST 54*   BILITOT 0.7       LFTs:   Recent Labs   Lab 11/21/22 0428   ALT 42   AST 54*   ALKPHOS 56   BILITOT 0.7   PROT 5.1*   ALBUMIN 2.1*         Significant Diagnostics:  I have reviewed all pertinent imaging results/findings within the past 24 hours.

## 2022-11-21 NOTE — PLAN OF CARE
[facility]  Initial Discharge Assessment       Primary Care Provider: Carolina Chavez MD    Admission Diagnosis: Fall [W19.XXXA]  Traumatic dislocation of shoulder region, left, initial encounter [S43.005A]    Admission Date: 11/18/2022  Expected Discharge Date: 11/21/2022    Discharge Barriers Identified: (P) None    Payor: [employer] MANAGED MEDICARE / Plan: [employer] SNP (SPECIAL NEEDS PLAN) / Product Type: Medicare Advantage /     Extended Emergency Contact Information  Primary Emergency Contact: Buzz Villasenor  Address: [address] [place] [address] [facility]  Home Phone: [phone]  Mobile Phone: [phone]  Relation: Relative  Secondary Emergency Contact: [facility]  Home Phone: [phone]  Relation: Other  Preferred language: English   needed? No    Discharge Plan A: (P) Group Home, Home Health         [facility] - [place]  [place] - [address]  [address] [address]  Phone: [phone] Fax: [phone]      Initial Assessment (most recent)       Adult Discharge Assessment - 11/21/22 1051          Discharge Assessment    Assessment Type Discharge Planning Assessment (P)      Confirmed/corrected address, phone number and insurance Yes (P)      Source of Information patient;other (see comments) (P)    group home staff    When was your last doctors appointment? 10/25/22 (P)      Communicated CARLOS with patient/caregiver Yes (P)      Reason For Admission fall (P)      Lives With facility resident (P)      Facility Arrived From: [facility] [phone] (P)      Do you expect to return to your current living situation? Yes (P)      Do you have help at home or someone to help you manage your care at home? Yes (P)      Who are your caregiver(s) and their phone number(s)? [facility] staff (P)      Prior to hospitilization cognitive status: Alert/Oriented (P)      Current cognitive status: Alert/Oriented (P)       Walking or Climbing Stairs Difficulty ambulation difficulty, requires equipment (P)      Mobility Management FWW (P)      Dressing/Bathing Difficulty bathing difficulty, assistance 1 person (P)      Dressing/Bathing Management staff assists (P)      Do you have any problems with: Errands/Grocery (P)      Equipment Currently Used at Home walker, standard (P)      Readmission within 30 days? No (P)      Patient currently being followed by outpatient case management? No (P)      Do you currently have service(s) that help you manage your care at home? Yes (P)      Name and Contact number of agency [facility] staff (P)      Do you take prescription medications? Yes (P)      Do you have prescription coverage? Yes (P)      Coverage [employer] Managed Medicare (P)      Do you have any problems affording any of your prescribed medications? No (P)      Is the patient taking medications as prescribed? yes (P)      Who is going to help you get home at discharge? [facility] staff (P)      How do you get to doctors appointments? agency;health plan transportation (P)      Are you on dialysis? No (P)      Do you take coumadin? No (P)      Discharge Plan A Group Home;Home Health (P)      DME Needed Upon Discharge  none (P)      Discharge Plan discussed with: Patient;Caregiver (P)      Discharge Barriers Identified None (P)         Relationship/Environment    Name(s) of Who Lives With Patient Patient lives in a [facility] other patients/residents (P)                    OSCAR met with patient at bedside.  Also present was her  ([facility]) staff.  Patient  lives at [facility] and has an RN there to assist with any needs.  She uses a FWW at home and does not require an additional DME.  MD requesting  for PT/OT and OSCAR sent [facility] per the facility.  Patient has not steps at the .  Patient is not on HD and does not take coumadin.  ROSALVA Castellanos [phone] will receive report at [facility].  OSCAR will set up transport.     Moni Jamison, LMSW [facility]  [phone]

## 2022-11-21 NOTE — DISCHARGE SUMMARY
[facility] - Surgery  General Surgery  Discharge Summary      Patient Name: Nettie Villasenor  MRN: [id number]  Admission Date: 11/18/2022  Hospital Length of Stay: 3 days  Discharge Date and Time: 11/21/2022  2:15 PM  Attending Physician: Patrick Murguia MD   Discharging Provider: Abeba Cain MD  Primary Care Provider: Carolina Chavez MD     HPI: Nettie Villasenor  is a 82 y.o. female with a pmhx of mental disability, breast cancer, osteopenia, and schizophrenia who is presenting to [facility] after a fall at her nursing home, [facility]. On imaging, the patient was found to have a anterior dislocation of her left shoulder, as well as, a dislocated fracture of her L 3rd rib and a minimally displaced fracture of her L 2nd rib w/ associated L hydropneumothorax. In the ED, the patient is stable and in no acute distress. She was placed on a non rebreather and satting in the high 90s. Her vitals are stable. L shoulder to be reduced in the ED under conscious sedation. General surgery consulted to evaluate after poly trauma.     * No surgery found *     Hospital Course: Nettie Villasenor is a 82 year old female with a pmhx of mental disability, breast cancer, osteopenia, and schizophrenia who presented to [facility] after a fall at her nursing home, [facility]. Imaging showed anterior dislocation of the left shoulder and surrounding soft tissue swelling as well as L 2-3rd rib fxs with associated L sided hydropneumothorax.  Patient was transfused 1 unit of packed red blood cells for hemoglobin of 6.3 to which she was responsive.   Patient symptoms  treated with multimodal pain control, aggressive pulmonary toilet, an orthopedic consult for shoulder reduction in ED. Patient shoulder was reduced in ED after multiple attempts. Left shoulder remains unstable.  Patient is now tolerating regular diet, pain controlled with PO meds, voiding spontaneously. Patient stable for discharge back to nursing home.     Consults:   Consults (From admission,  onward)          Status Ordering Provider     Inpatient consult to Orthopedic Surgery  Once        Provider:  (Not yet assigned)    Completed JIMMY NUNEZ     Inpatient consult to General surgery  Once        Provider:  (Not yet assigned)    Completed JIMMY NUNEZ            Significant Diagnostic Studies: Labs: CMP   Recent Labs   Lab 11/20/22  0528 11/21/22 0428    139   K 4.0 3.8   * 108   CO2 22* 26    90   BUN 57* 42*   CREATININE 1.2 0.8   CALCIUM 8.7 8.4*   PROT 5.4* 5.1*   ALBUMIN 2.3* 2.1*   BILITOT 0.6 0.7   ALKPHOS 53* 56   AST 59* 54*   ALT 39 42   ANIONGAP 11 5*    and CBC   Recent Labs   Lab 11/20/22  0528 11/21/22 0428   WBC 9.69 8.23   HGB 6.3* 7.7*   HCT 20.3* 24.3*    263       Pending Diagnostic Studies:       None          Final Active Diagnoses:    Diagnosis Date Noted POA    PRINCIPAL PROBLEM:  Fall [W19.XXXA] 11/18/2022 Yes    Multiple closed fractures of ribs of left side [S22.42XA] 11/21/2022 Yes    Hydropneumothorax [J94.8] 11/21/2022 Yes    Traumatic dislocation of shoulder region, left, initial encounter [S43.005A] 11/20/2022 Yes    Traumatic anterior dislocation of left shoulder [S43.015A] 11/18/2022 Yes    Mood disorder [F39] 01/29/2020 Yes    Anxiety [F41.9] 01/29/2020 Yes    Thyroid nodule [E04.1] 09/11/2012 Yes      Problems Resolved During this Admission:      Discharged Condition: good    Disposition: Another Health Care Inst*    Follow Up: with PCP    Patient Instructions:      Ambulatory referral/consult to Endocrine Surgery   Standing Status: Future   Referral Priority: Routine Referral Type: Consultation   Requested Specialty: Surgery   Number of Visits Requested: 1     Diet Adult Regular     Notify your health care provider if you experience any of the following:  temperature >100.4     Notify your health care provider if you experience any of the following:  persistent nausea and vomiting or diarrhea     Notify your health care provider  if you experience any of the following:  severe uncontrolled pain     Notify your health care provider if you experience any of the following:  redness, tenderness, or signs of infection (pain, swelling, redness, odor or green/yellow discharge around incision site)     Activity as tolerated     Medications:  Reconciled Home Medications:      Medication List        START taking these medications      ketorolac 10 mg tablet  Commonly known as: TORADOL  Take 1 tablet (10 mg total) by mouth every 6 (six) hours. for 5 days     oxyCODONE 5 MG immediate release tablet  Commonly known as: ROXICODONE  Take 1 tablet (5 mg total) by mouth every 6 (six) hours as needed for Pain.            CONTINUE taking these medications      acetaminophen 500 MG tablet  Commonly known as: TYLENOL  Take 1,000 mg by mouth every evening.     alendronate 70 MG tablet  Commonly known as: FOSAMAX  TAKE 1 TABLET BY MOUTH EVERY 7 DAYS     anastrozole 1 mg Tab  Commonly known as: ARIMIDEX  Take 1 tablet (1 mg total) by mouth once daily.     ARIPiprazole 2 MG Tab  Commonly known as: ABILIFY  TAKE 1 TABLET BY MOUTH EVERY EVENING at bedtime     * aspirin 81 MG Chew  Take 81 mg by mouth once daily.     * aspirin 81 MG EC tablet  Commonly known as: ECOTRIN  TAKE 1 TABLET BY MOUTH daily TO PREVENT CLOTS     calcium carbonate 500 mg calcium (1,250 mg) tablet  Commonly known as: OS-OTTONIEL  Take 1 tablet by mouth 2 (two) times daily.     docusate sodium 100 MG capsule  Commonly known as: COLACE  Take 100 mg by mouth once daily.     fexofenadine 180 MG tablet  Commonly known as: ALLEGRA  Take 180 mg by mouth daily as needed.     FLUoxetine 20 MG capsule  TAKE 1 CAPSULE BY MOUTH every morning     HYDROcodone-acetaminophen 5-325 mg per tablet  Commonly known as: NORCO  Take 1 tablet by mouth every 6 (six) hours as needed for Pain.     LORazepam 0.5 MG tablet  Commonly known as: ATIVAN  Take 0.5 mg by mouth every 12 (twelve) hours as needed.     MODERNA COVID-19  VACCINE (EUA) IM  ADMINISTER 0.5ML IN THE MUSCLE AS DIRECTED     multivitamin capsule  Take 1 capsule by mouth once daily.     vitamin D 1000 units Tab  Commonly known as: VITAMIN D3  Take 185 mg by mouth once daily.           * This list has 2 medication(s) that are the same as other medications prescribed for you. Read the directions carefully, and ask your doctor or other care provider to review them with you.                  Abeba Cain MD  General Surgery  [facility] - Surgery

## 2022-11-21 NOTE — NURSING
Report given to Karyna at [facility], including medication directions, follow up appointments.  Confirmed with bedside delivery that meds will be delivered and no copay

## 2022-11-21 NOTE — ASSESSMENT & PLAN NOTE
Nettie Villasenor is a 82 year old female with a pmhx of mental disability, breast cancer, osteopenia, and schizophrenia who is presenting to [facility] after a fall at her nursing home, [facility]. imaging showing anterior dislocation of the left shoulder and surrounding soft tissue swelling as well as L 2-3rd rib fxs with associated L sided hydropneumothroax. No other fractures found on imaging (see above). Clinically stable, satting well on RA.     -- Regular diet  -- Aspiration precautions   -- orthopedics consulted for shoulder reduction in ED, appreciate assistance   - Closed reduced in ED after multiple attempts. L shoulder remains unstable   - Sling and swathe at all times   - NWJOSHUA JO   - Will discuss final recs and follow up with them today  -- transfused 1u PRBC for Hb 6.3 on 11/21. H/H responsive today, no signs of ongoing bleeding  -- aggressive pulmonary toilet   -- PRN pain and nausea medications  -- Daily labs  -- Replete electrolytes PRN  -- DVT prophylaxis (SCDs)  - OOB, ambulate  - IS    Dispo: medically stable for dc. Plan to return to [place]. SW/CM involved, appreciate assistance

## 2022-11-21 NOTE — PROGRESS NOTES
[facility]  General Surgery  Progress Note    Subjective:     History of Present Illness:  Nettie Villasenor  is a 82 y.o. female with a pmhx of mental disability, breast cancer, osteopenia, and schizophrenia who is [place] [facility] [place] at her nursing home, [facility]. On imaging, the patient was found to have a anterior dislocation of her left shoulder, as well as, a dislocated fracture of her L 3rd rib and a minimally displaced fracture of her L 2nd rib w/ associated L hydropneumothorax. In the ED, the patient is stable and in no acute distress. She was placed on a non rebreather and satting in the high 90s. Her vitals are stable. L shoulder to be reduced in the ED under conscious sedation. General surgery consulted to evaluate after poly trauma.          Post-Op Info:  * No surgery found *         Interval History: NAEON. Afebrile. HDS. Reports feeling good this morning. H/H responsive to 1 unit pRBC yesterday. Satting well on RA. Pain is controlled with current regimen. Adequate UOP. Tolerating diet. No new symptoms or concerns this morning. Guest at bedside. All questions answered.       Medications:  Continuous Infusions:      Scheduled Meds:   acetaminophen  650 mg Oral Q6H    ARIPiprazole  2 mg Oral QHS    docusate sodium  100 mg Oral Daily    enoxaparin  30 mg Subcutaneous Q12H    FLUoxetine  20 mg Oral QAM    ketorolac  15 mg Intravenous Q6H     PRN Meds:sodium chloride, acetaminophen, HYDROmorphone, levalbuterol, LIDOcaine (PF) 10 mg/ml (1%), melatonin, ondansetron, oxyCODONE, oxyCODONE, sodium chloride 0.9%     Review of patient's allergies indicates:  No Known Allergies  Objective:     Vital Signs (Most Recent):  Temp: 98 °F (36.7 °C) (11/21/22 0739)  Pulse: 78 (11/21/22 0739)  Resp: 18 (11/21/22 0739)  BP: (!) 141/64 (11/21/22 0739)  SpO2: 100 % (11/21/22 0739)   Vital Signs (24h Range):  Temp:  [97.3 °F (36.3 °C)-98.5 °F (36.9 °C)] 98 °F (36.7 °C)  Pulse:  [78-97] 78  Resp:   [16-19] 18  SpO2:  [91 %-100 %] 100 %  BP: (114-141)/(58-90) 141/64     Weight: 66.7 kg (147 lb)  Body mass index is 26.89 kg/m².    Intake/Output - Last 3 Shifts         11/19 0700  11/20 0659 11/20 0700 11/21 0659 11/21 0700 11/22 0659    P.O.  858     Blood  355.4     Total Intake(mL/kg)  1213.4 (18.2)     Urine (mL/kg/hr)  1000 (0.6)     Stool  0     Total Output  1000     Net  +213.4            Urine Occurrence  1 x 1 x    Stool Occurrence  1 x             Physical Exam  Vitals and nursing note reviewed.   Constitutional:       General: She is not in acute distress.     Appearance: Normal appearance.   HENT:      Head: Normocephalic and atraumatic.      Nose: Nose normal.      Mouth/Throat:      Mouth: Mucous membranes are dry.   Eyes:      EOM intact. Conjunctiva normal.   Cardiovascular:      Rate and Rhythm: Normal rate.   Pulmonary:      Effort: Pulmonary effort is normal. No respiratory distress.      Comments: Room air; L chest TTP; no paradoxical chest wall movements  Abdominal:      General: Abdomen is flat. There is no distension.      Palpations: Abdomen is soft.      Tenderness: There is no abdominal tenderness. There is no guarding or rebound.   Musculoskeletal:         General: Swelling, tenderness, deformity and signs of injury present.      Cervical back: Normal range of motion.      Comments: Left shoulder now reduced, sling in place; ecchymosis to LUE and soft tissue swelling    Skin:     General: Skin is warm.   Neurological:      Mental Status: She is alert. Mental status is at baseline.   Psychiatric:         Mood and Affect: Mood normal.         Behavior: Behavior normal.        Significant Labs:  I have reviewed all pertinent lab results within the past 24 hours.  CBC:   Recent Labs   Lab 11/21/22 0428   WBC 8.23   RBC 2.35*   HGB 7.7*   HCT 24.3*      *   MCH 32.8*   MCHC 31.7*       CMP:   Recent Labs   Lab 11/21/22 0428   GLU 90   CALCIUM 8.4*   ALBUMIN 2.1*   PROT  5.1*      K 3.8   CO2 26      BUN 42*   CREATININE 0.8   ALKPHOS 56   ALT 42   AST 54*   BILITOT 0.7       LFTs:   Recent Labs   Lab 11/21/22  0428   ALT 42   AST 54*   ALKPHOS 56   BILITOT 0.7   PROT 5.1*   ALBUMIN 2.1*         Significant Diagnostics:  I have reviewed all pertinent imaging results/findings within the past 24 hours.    Assessment/Plan:     * Fall  Nettie Villasenor is a 82 year old female with a pmhx of mental disability, breast cancer, osteopenia, and schizophrenia who is presenting to [facility] after a fall at her nursing home, [facility]. imaging showing anterior dislocation of the left shoulder and surrounding soft tissue swelling as well as L 2-3rd rib fxs with associated L sided hydropneumothroax. No other fractures found on imaging (see above). Clinically stable, satting well on RA.     -- Regular diet  -- Aspiration precautions   -- orthopedics consulted for shoulder reduction in ED, appreciate assistance   - Closed reduced in ED after multiple attempts. L shoulder remains unstable   - Sling and swathe at all times   - NWJOSHUA LUALEXIS   - Will discuss final recs and follow up with them today  -- transfused 1u PRBC for Hb 6.3 on 11/21. H/H responsive today, no signs of ongoing bleeding  -- aggressive pulmonary toilet   -- PRN pain and nausea medications  -- Daily labs  -- Replete electrolytes PRN  -- DVT prophylaxis (SCDs)  - OOB, ambulate  - IS    Dispo: medically stable for dc. Plan to return to [place]. SW/CM involved, appreciate assistance     Traumatic anterior dislocation of left shoulder  - see principle problem  - Ortho consulted, appreciate assistance    Anxiety  - see mood disorder    Mood disorder  - At baseline  - Continue home abilify and fluoxetine     Case discussed with Dr. Murguia.      JARRETT SchwartzC  General Surgery  [facility] - [facility]

## 2022-11-22 PROCEDURE — G0180 MD CERTIFICATION HHA PATIENT: HCPCS | Mod: ,,, | Performed by: STUDENT IN AN ORGANIZED HEALTH CARE EDUCATION/TRAINING PROGRAM

## 2022-11-22 PROCEDURE — G0180 PR HOME HEALTH MD CERTIFICATION: ICD-10-PCS | Mod: ,,, | Performed by: STUDENT IN AN ORGANIZED HEALTH CARE EDUCATION/TRAINING PROGRAM

## 2022-11-28 ENCOUNTER — HOSPITAL ENCOUNTER (OUTPATIENT)
Dept: RADIOLOGY | Facility: HOSPITAL | Age: 82
Discharge: HOME | End: 2022-11-28
Attending: NURSE PRACTITIONER
Payer: MEDICARE

## 2022-11-28 ENCOUNTER — OFFICE VISIT (OUTPATIENT)
Dept: ORTHOPEDICS | Facility: CLINIC | Age: 82
End: 2022-11-28
Payer: MEDICARE

## 2022-11-28 VITALS — BODY MASS INDEX: 27.06 KG/M2 | WEIGHT: 147.06 LBS | HEIGHT: 62 IN

## 2022-11-28 DIAGNOSIS — M25.512 ACUTE PAIN OF LEFT SHOULDER: ICD-10-CM

## 2022-11-28 DIAGNOSIS — M25.512 ACUTE PAIN OF LEFT SHOULDER: Primary | ICD-10-CM

## 2022-11-28 DIAGNOSIS — S43.015A TRAUMATIC ANTERIOR DISLOCATION OF LEFT SHOULDER, INITIAL ENCOUNTER: Primary | ICD-10-CM

## 2022-11-28 PROCEDURE — 99213 OFFICE O/P EST LOW 20 MIN: CPT | Mod: S$GLB,,, | Performed by: NURSE PRACTITIONER

## 2022-11-28 PROCEDURE — 3288F PR FALLS RISK ASSESSMENT DOCUMENTED: ICD-10-PCS | Mod: CPTII,S$GLB,, | Performed by: NURSE PRACTITIONER

## 2022-11-28 PROCEDURE — 1100F PR PT FALLS ASSESS DOC 2+ FALLS/FALL W/INJURY/YR: ICD-10-PCS | Mod: CPTII,S$GLB,, | Performed by: NURSE PRACTITIONER

## 2022-11-28 PROCEDURE — 73030 X-RAY EXAM OF SHOULDER: CPT | Mod: 26,LT,, | Performed by: RADIOLOGY

## 2022-11-28 PROCEDURE — 1125F AMNT PAIN NOTED PAIN PRSNT: CPT | Mod: CPTII,S$GLB,, | Performed by: NURSE PRACTITIONER

## 2022-11-28 PROCEDURE — 99999 PR PBB SHADOW E&M-EST. PATIENT-LVL III: ICD-10-PCS | Mod: PBBFAC,,, | Performed by: NURSE PRACTITIONER

## 2022-11-28 PROCEDURE — 73030 XR SHOULDER COMPLETE 2 OR MORE VIEWS LEFT: ICD-10-PCS | Mod: 26,LT,, | Performed by: RADIOLOGY

## 2022-11-28 PROCEDURE — 1160F RVW MEDS BY RX/DR IN RCRD: CPT | Mod: CPTII,S$GLB,, | Performed by: NURSE PRACTITIONER

## 2022-11-28 PROCEDURE — 1125F PR PAIN SEVERITY QUANTIFIED, PAIN PRESENT: ICD-10-PCS | Mod: CPTII,S$GLB,, | Performed by: NURSE PRACTITIONER

## 2022-11-28 PROCEDURE — 1100F PTFALLS ASSESS-DOCD GE2>/YR: CPT | Mod: CPTII,S$GLB,, | Performed by: NURSE PRACTITIONER

## 2022-11-28 PROCEDURE — 99213 PR OFFICE/OUTPT VISIT, EST, LEVL III, 20-29 MIN: ICD-10-PCS | Mod: S$GLB,,, | Performed by: NURSE PRACTITIONER

## 2022-11-28 PROCEDURE — 99999 PR PBB SHADOW E&M-EST. PATIENT-LVL III: CPT | Mod: PBBFAC,,, | Performed by: NURSE PRACTITIONER

## 2022-11-28 PROCEDURE — 1159F PR MEDICATION LIST DOCUMENTED IN MEDICAL RECORD: ICD-10-PCS | Mod: CPTII,S$GLB,, | Performed by: NURSE PRACTITIONER

## 2022-11-28 PROCEDURE — 73030 X-RAY EXAM OF SHOULDER: CPT | Mod: TC,LT

## 2022-11-28 PROCEDURE — 3288F FALL RISK ASSESSMENT DOCD: CPT | Mod: CPTII,S$GLB,, | Performed by: NURSE PRACTITIONER

## 2022-11-28 PROCEDURE — 1111F DSCHRG MED/CURRENT MED MERGE: CPT | Mod: CPTII,S$GLB,, | Performed by: NURSE PRACTITIONER

## 2022-11-28 PROCEDURE — 1159F MED LIST DOCD IN RCRD: CPT | Mod: CPTII,S$GLB,, | Performed by: NURSE PRACTITIONER

## 2022-11-28 PROCEDURE — 1111F PR DISCHARGE MEDS RECONCILED W/ CURRENT OUTPATIENT MED LIST: ICD-10-PCS | Mod: CPTII,S$GLB,, | Performed by: NURSE PRACTITIONER

## 2022-11-28 PROCEDURE — 1160F PR REVIEW ALL MEDS BY PRESCRIBER/CLIN PHARMACIST DOCUMENTED: ICD-10-PCS | Mod: CPTII,S$GLB,, | Performed by: NURSE PRACTITIONER

## 2022-11-28 NOTE — PROGRESS NOTES
SUBJECTIVE:     Chief Complaint & History of Present Illness:  Nettie Villasenor is a New 82 y.o. year old female patient presenting with intermittent left shoulder pain that started 11/19/22.  Per record review, she fell at her nursing facility, [facility], where she dislocated her left shoulder and fractured her left 3rd right and minimally displaced fracture of her left 2nd rib with associated left hydropneumothorax.  She was seen in the ED by Orthopedics where her left shoulder was reduced and then placed into a sling and swath.  She was told to remain NWB to her LUE and follow up in clinic.    Currently, she reports no pain.   She is Right hand dominant.  There is a history of injury.  T Previous treatments include sling and reduction by Ortho resident in the ED which have provided good relief.  There is not a history of previous injury or surgery to the shoulder.      Review of patient's allergies indicates:  No Known Allergies      Current Outpatient Medications   Medication Sig Dispense Refill    acetaminophen (TYLENOL) 500 MG tablet Take 1,000 mg by mouth every evening.      alendronate (FOSAMAX) 70 MG tablet TAKE 1 TABLET BY MOUTH EVERY 7 DAYS 4 tablet 3    anastrozole (ARIMIDEX) 1 mg Tab Take 1 tablet (1 mg total) by mouth once daily. 90 tablet 3    ARIPiprazole (ABILIFY) 2 MG Tab TAKE 1 TABLET BY MOUTH EVERY EVENING at bedtime 30 tablet 11    aspirin (ECOTRIN) 81 MG EC tablet TAKE 1 TABLET BY MOUTH daily TO PREVENT CLOTS 30 tablet 11    aspirin 81 MG Chew Take 81 mg by mouth once daily.      calcium carbonate (OS-OTTONIEL) 500 mg calcium (1,250 mg) tablet Take 1 tablet by mouth 2 (two) times daily.      COVID-19 vacc,mRNA,Moderna,/PF (MODERNA COVID-19 VACCINE, EUA, IM) ADMINISTER 0.5ML IN THE MUSCLE AS DIRECTED      docusate sodium (COLACE) 100 MG capsule Take 100 mg by mouth once daily.      fexofenadine (ALLEGRA) 180 MG tablet Take 180 mg by mouth daily as needed.      FLUoxetine 20 MG capsule TAKE 1  CAPSULE BY MOUTH every morning 30 capsule 11    HYDROcodone-acetaminophen (NORCO) 5-325 mg per tablet Take 1 tablet by mouth every 6 (six) hours as needed for Pain. 21 tablet 0    lorazepam (ATIVAN) 0.5 MG tablet Take 0.5 mg by mouth every 12 (twelve) hours as needed.      multivitamin capsule Take 1 capsule by mouth once daily.      oxyCODONE (ROXICODONE) 5 MG immediate release tablet Take 1 tablet (5 mg total) by mouth every 6 (six) hours as needed for Pain. 15 tablet 0    vitamin D 1000 units Tab Take 185 mg by mouth once daily.       No current facility-administered medications for this visit.     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    Osteopenia 3/13/2013    Schizophrenia 3/13/2013    Thyroid nodule        Past Surgical History:   Procedure Laterality Date    BREAST BIOPSY Left     BREAST LUMPECTOMY      LUMPECTOMY,BREAST,WITH RADIOACTIVE SEED LOCALIZATION Left 09/04/2020    Procedure: LUMPECTOMY,BREAST,WITH RADIOACTIVE SEED LOCALIZATION reflector placed on 9/2/20;  Surgeon: Los Benites MD;  Location: [facility];  Service: General;  Laterality: Left;    ORIF left lower leg      TONSILLECTOMY         Family History   Problem Relation Age of Onset    Dementia Mother     Heart attack Father     Melanoma Neg Hx     Breast cancer Neg Hx     Colon cancer Neg Hx     Ovarian cancer Neg Hx            Review of Systems:  ROS:  Constitutional: no fever or chills  Eyes: no  "visual changes  ENT: no nasal congestion or sore throat  Respiratory: no cough or shortness of breath  Cardiovascular: no chest pain or palpitations  Gastrointestinal: no nausea or vomiting, tolerating diet  Genitourinary: no hematuria or dysuria  Integument/Breast: no rash or pruritis  Hematologic/Lymphatic: no easy bruising or lymphadenopathy  Musculoskeletal: positive for arthralgias  Neurological: no seizures or tremors  Behavioral/Psych: no auditory or visual hallucinations  Endocrine: no heat or cold intolerance      OBJECTIVE:     PHYSICAL EXAM:  Vital Signs (Most Recent)  There were no vitals filed for this visit.  Height: 5' 2" (157.5 cm) Weight: 66.7 kg (147 lb 0.8 oz),   Estimated body mass index is 26.9 kg/m² as calculated from the following:    Height as of this encounter: 5' 2" (1.575 m).    Weight as of this encounter: 66.7 kg (147 lb 0.8 oz).   General Appearance: Well nourished, well developed, in no acute distress.  HENT: Normal cephalic, oropharynx pink and moist  Eyes: PERRLA bilaterally and EOM x 4  Respiratory: Even and unlabored  Skin: Warm and Dry.   Psychiatric: AAO x 4, Mood & affect are normal.    Shoulder exam: left  Tenderness: none  ROM: normal ROM of fingers, wrist, and elbow.  Can do pendulums  Shoulder Strength: not tested  sensory exam normal and radial pulse intact      RADIOGRAPHS:  X-ray of the left shoulder was obtained, no acute fractures or dislocation seen.  All radiographs were personally reviewed by me.    ASSESSMENT/PLAN:       ICD-10-CM ICD-9-CM   1. Traumatic anterior dislocation of left shoulder, initial encounter  S43.015A 831.01     -Nettie Villasenor presents to clinic for emergency department follow-up following a traumatic anterior dislocation of the left shoulder dated November 19, 2022.  Due to a fall at the nursing facility.  -X-ray as above.  -Recommend RICE therapy.  -Currently has no pain.  -Will transition her into a regular sling.  -Consult PT/OT to start " Pendulum swings.  -Remain NWB to LUE.  -ROMAT at left wrist, fingers, and elbow.  -Follow up in 4 weeks, at time repeat xray of left shoulder 2 view.  If stable will let her advance her ROM as she tolerates and begin weight bearing activities.

## 2022-12-01 ENCOUNTER — HOSPITAL ENCOUNTER (OUTPATIENT)
Dept: RADIOLOGY | Facility: HOSPITAL | Age: 82
Discharge: HOME | End: 2022-12-01
Attending: INTERNAL MEDICINE
Payer: MEDICARE

## 2022-12-01 DIAGNOSIS — M79.602 PAIN OF LEFT UPPER EXTREMITY: ICD-10-CM

## 2022-12-01 DIAGNOSIS — R60.9 EDEMA, UNSPECIFIED TYPE: Primary | ICD-10-CM

## 2022-12-01 DIAGNOSIS — R60.9 EDEMA, UNSPECIFIED TYPE: ICD-10-CM

## 2022-12-01 PROCEDURE — 93970 EXTREMITY STUDY: CPT | Mod: 26,,, | Performed by: RADIOLOGY

## 2022-12-01 PROCEDURE — 93970 US LOWER EXTREMITY VEINS BILATERAL: ICD-10-PCS | Mod: 26,,, | Performed by: RADIOLOGY

## 2022-12-01 PROCEDURE — 93970 EXTREMITY STUDY: CPT | Mod: TC

## 2022-12-02 ENCOUNTER — HOSPITAL ENCOUNTER (OUTPATIENT)
Dept: RADIOLOGY | Facility: HOSPITAL | Age: 82
Discharge: HOME | End: 2022-12-02
Attending: INTERNAL MEDICINE
Payer: MEDICARE

## 2022-12-02 DIAGNOSIS — R60.9 EDEMA, UNSPECIFIED TYPE: ICD-10-CM

## 2022-12-02 DIAGNOSIS — M79.602 PAIN OF LEFT UPPER EXTREMITY: ICD-10-CM

## 2022-12-02 PROCEDURE — 93971 EXTREMITY STUDY: CPT | Mod: TC,LT

## 2022-12-02 PROCEDURE — 93971 US UPPER EXTREMITY VEINS LEFT: ICD-10-PCS | Mod: 26,LT,, | Performed by: RADIOLOGY

## 2022-12-02 PROCEDURE — 93971 EXTREMITY STUDY: CPT | Mod: 26,LT,, | Performed by: RADIOLOGY

## 2022-12-08 ENCOUNTER — EXTERNAL HOME HEALTH (OUTPATIENT)
Dept: HOME HEALTH SERVICES | Facility: HOSPITAL | Age: 82
End: 2022-12-08
Payer: MEDICARE

## 2022-12-09 ENCOUNTER — LAB VISIT (OUTPATIENT)
Dept: LAB | Facility: HOSPITAL | Age: 82
End: 2022-12-09
Attending: INTERNAL MEDICINE
Payer: MEDICARE

## 2022-12-09 DIAGNOSIS — Z79.899 ENCOUNTER FOR LONG-TERM (CURRENT) USE OF OTHER MEDICATIONS: Primary | ICD-10-CM

## 2022-12-09 LAB
BASOPHILS # BLD AUTO: 0.02 K/UL (ref 0–0.2)
BASOPHILS NFR BLD: 0.6 % (ref 0–1.9)
DIFFERENTIAL METHOD: ABNORMAL
EOSINOPHIL # BLD AUTO: 0.1 K/UL (ref 0–0.5)
EOSINOPHIL NFR BLD: 3.5 % (ref 0–8)
ERYTHROCYTE [DISTWIDTH] IN BLOOD BY AUTOMATED COUNT: 14.9 % (ref 11.5–14.5)
HCT VFR BLD AUTO: 29.7 % (ref 37–48.5)
HGB BLD-MCNC: 9.1 G/DL (ref 12–16)
IMM GRANULOCYTES # BLD AUTO: 0.02 K/UL (ref 0–0.04)
IMM GRANULOCYTES NFR BLD AUTO: 0.6 % (ref 0–0.5)
LYMPHOCYTES # BLD AUTO: 1.1 K/UL (ref 1–4.8)
LYMPHOCYTES NFR BLD: 33 % (ref 18–48)
MCH RBC QN AUTO: 32.7 PG (ref 27–31)
MCHC RBC AUTO-ENTMCNC: 30.6 G/DL (ref 32–36)
MCV RBC AUTO: 107 FL (ref 82–98)
MONOCYTES # BLD AUTO: 0.3 K/UL (ref 0.3–1)
MONOCYTES NFR BLD: 9.9 % (ref 4–15)
NEUTROPHILS # BLD AUTO: 1.8 K/UL (ref 1.8–7.7)
NEUTROPHILS NFR BLD: 52.4 % (ref 38–73)
NRBC BLD-RTO: 0 /100 WBC
PLATELET # BLD AUTO: 360 K/UL (ref 150–450)
PMV BLD AUTO: 12.1 FL (ref 9.2–12.9)
RBC # BLD AUTO: 2.78 M/UL (ref 4–5.4)
WBC # BLD AUTO: 3.45 K/UL (ref 3.9–12.7)

## 2022-12-09 PROCEDURE — 85025 COMPLETE CBC W/AUTO DIFF WBC: CPT | Performed by: INTERNAL MEDICINE

## 2022-12-10 ENCOUNTER — TELEPHONE (OUTPATIENT)
Dept: INTERNAL MEDICINE | Facility: CLINIC | Age: 82
End: 2022-12-10
Payer: MEDICARE

## 2022-12-10 NOTE — TELEPHONE ENCOUNTER
Pt with worsened BMD on recent bone density test    Currently on alendronate.  Will stop alendronate and initiate Prolia therapy.    Prolia orders placed

## 2022-12-12 NOTE — PLAN OF CARE
[facility] - [facility]  Discharge Final Note    Primary Care Provider: Carolina Chavez MD    Expected Discharge Date: 11/21/2022    Final Discharge Note (most recent)       Final Note - 11/21/22 1415          Final Note    Assessment Type Final Discharge Note     Anticipated Discharge Disposition Home or Self Care   Return to [facility] Resources/Appts/Education Provided Provided patient/caregiver with written discharge plan information;Appointments scheduled by Navigator/Coordinator                   Return to [facility]    Important Message from Medicare  Important Message from Medicare regarding Discharge Appeal Rights: Given to patient/caregiver, Explained to patient/caregiver, Signed/date by patient/caregiver, Other (comments) (care taker signed)     Date IMM was signed: 11/21/22  Time IMM was signed: 9001

## 2022-12-13 ENCOUNTER — TELEPHONE (OUTPATIENT)
Dept: INFECTIOUS DISEASES | Facility: HOSPITAL | Age: 82
End: 2022-12-13
Payer: MEDICARE

## 2022-12-27 ENCOUNTER — HOSPITAL ENCOUNTER (OUTPATIENT)
Dept: RADIOLOGY | Facility: HOSPITAL | Age: 82
Discharge: HOME | End: 2022-12-27
Attending: NURSE PRACTITIONER
Payer: MEDICARE

## 2022-12-27 ENCOUNTER — OFFICE VISIT (OUTPATIENT)
Dept: ORTHOPEDICS | Facility: CLINIC | Age: 82
End: 2022-12-27
Payer: MEDICARE

## 2022-12-27 VITALS — BODY MASS INDEX: 27.06 KG/M2 | WEIGHT: 147.06 LBS | HEIGHT: 62 IN

## 2022-12-27 DIAGNOSIS — M25.512 ACUTE PAIN OF LEFT SHOULDER: ICD-10-CM

## 2022-12-27 DIAGNOSIS — M25.512 ACUTE PAIN OF LEFT SHOULDER: Primary | ICD-10-CM

## 2022-12-27 DIAGNOSIS — S43.015D: Primary | ICD-10-CM

## 2022-12-27 PROCEDURE — 99213 PR OFFICE/OUTPT VISIT, EST, LEVL III, 20-29 MIN: ICD-10-PCS | Mod: HCNC,S$GLB,, | Performed by: NURSE PRACTITIONER

## 2022-12-27 PROCEDURE — 73030 X-RAY EXAM OF SHOULDER: CPT | Mod: 26,HCNC,LT, | Performed by: RADIOLOGY

## 2022-12-27 PROCEDURE — 99999 PR PBB SHADOW E&M-EST. PATIENT-LVL III: ICD-10-PCS | Mod: PBBFAC,HCNC,, | Performed by: NURSE PRACTITIONER

## 2022-12-27 PROCEDURE — 73030 XR SHOULDER COMPLETE 2 OR MORE VIEWS LEFT: ICD-10-PCS | Mod: 26,HCNC,LT, | Performed by: RADIOLOGY

## 2022-12-27 PROCEDURE — 1126F PR PAIN SEVERITY QUANTIFIED, NO PAIN PRESENT: ICD-10-PCS | Mod: HCNC,CPTII,S$GLB, | Performed by: NURSE PRACTITIONER

## 2022-12-27 PROCEDURE — 99999 PR PBB SHADOW E&M-EST. PATIENT-LVL III: CPT | Mod: PBBFAC,HCNC,, | Performed by: NURSE PRACTITIONER

## 2022-12-27 PROCEDURE — 1160F PR REVIEW ALL MEDS BY PRESCRIBER/CLIN PHARMACIST DOCUMENTED: ICD-10-PCS | Mod: HCNC,CPTII,S$GLB, | Performed by: NURSE PRACTITIONER

## 2022-12-27 PROCEDURE — 1159F MED LIST DOCD IN RCRD: CPT | Mod: HCNC,CPTII,S$GLB, | Performed by: NURSE PRACTITIONER

## 2022-12-27 PROCEDURE — 3288F FALL RISK ASSESSMENT DOCD: CPT | Mod: HCNC,CPTII,S$GLB, | Performed by: NURSE PRACTITIONER

## 2022-12-27 PROCEDURE — 1101F PT FALLS ASSESS-DOCD LE1/YR: CPT | Mod: HCNC,CPTII,S$GLB, | Performed by: NURSE PRACTITIONER

## 2022-12-27 PROCEDURE — 3288F PR FALLS RISK ASSESSMENT DOCUMENTED: ICD-10-PCS | Mod: HCNC,CPTII,S$GLB, | Performed by: NURSE PRACTITIONER

## 2022-12-27 PROCEDURE — 1160F RVW MEDS BY RX/DR IN RCRD: CPT | Mod: HCNC,CPTII,S$GLB, | Performed by: NURSE PRACTITIONER

## 2022-12-27 PROCEDURE — 99213 OFFICE O/P EST LOW 20 MIN: CPT | Mod: HCNC,S$GLB,, | Performed by: NURSE PRACTITIONER

## 2022-12-27 PROCEDURE — 73030 X-RAY EXAM OF SHOULDER: CPT | Mod: TC,HCNC,LT

## 2022-12-27 PROCEDURE — 1126F AMNT PAIN NOTED NONE PRSNT: CPT | Mod: HCNC,CPTII,S$GLB, | Performed by: NURSE PRACTITIONER

## 2022-12-27 PROCEDURE — 1159F PR MEDICATION LIST DOCUMENTED IN MEDICAL RECORD: ICD-10-PCS | Mod: HCNC,CPTII,S$GLB, | Performed by: NURSE PRACTITIONER

## 2022-12-27 PROCEDURE — 1101F PR PT FALLS ASSESS DOC 0-1 FALLS W/OUT INJ PAST YR: ICD-10-PCS | Mod: HCNC,CPTII,S$GLB, | Performed by: NURSE PRACTITIONER

## 2022-12-27 NOTE — PROGRESS NOTES
SUBJECTIVE:     Chief Complaint & History of Present Illness:  Nettie Villasenor is a Established 82 y.o. year old female patient presenting for follow up from her left shoulder dislocation she sustained from a fall on 11/19/22.  She was last seen in the clinic on 11-28-22 and allowed to start pendulum swings and told to continue ROMAT at fingers, wrist, and elbow.  She was told to avoid weight bearing activities.      Currently she reports no pain in her left shoulder.  She reports she has been working with therapy.  Denies falls or injuries since her last clinic visit.        Review of patient's allergies indicates:  No Known Allergies      Current Outpatient Medications   Medication Sig Dispense Refill    acetaminophen (TYLENOL) 500 MG tablet Take 1,000 mg by mouth every evening.      anastrozole (ARIMIDEX) 1 mg Tab Take 1 tablet (1 mg total) by mouth once daily. 90 tablet 3    ARIPiprazole (ABILIFY) 2 MG Tab TAKE 1 TABLET BY MOUTH EVERY EVENING at bedtime 30 tablet 11    aspirin (ECOTRIN) 81 MG EC tablet TAKE 1 TABLET BY MOUTH daily TO PREVENT CLOTS 30 tablet 11    aspirin 81 MG Chew Take 81 mg by mouth once daily.      calcium carbonate (OS-OTTONIEL) 500 mg calcium (1,250 mg) tablet Take 1 tablet by mouth 2 (two) times daily.      COVID-19 vacc,mRNA,Moderna,/PF (MODERNA COVID-19 VACCINE, EUA, IM) ADMINISTER 0.5ML IN THE MUSCLE AS DIRECTED      docusate sodium (COLACE) 100 MG capsule Take 100 mg by mouth once daily.      fexofenadine (ALLEGRA) 180 MG tablet Take 180 mg by mouth daily as needed.      FLUoxetine 20 MG capsule TAKE 1 CAPSULE BY MOUTH every morning 30 capsule 11    HYDROcodone-acetaminophen (NORCO) 5-325 mg per tablet Take 1 tablet by mouth every 6 (six) hours as needed for Pain. 21 tablet 0    lorazepam (ATIVAN) 0.5 MG tablet Take 0.5 mg by mouth every 12 (twelve) hours as needed.      multivitamin capsule Take 1 capsule by mouth once daily.      oxyCODONE (ROXICODONE) 5 MG immediate release tablet Take 1  "tablet (5 mg total) by mouth every 6 (six) hours as needed for Pain. 15 tablet 0    vitamin D 1000 units Tab Take 185 mg by mouth once daily.       No current facility-administered medications for this visit.       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    Osteopenia 3/13/2013    Schizophrenia 3/13/2013    Thyroid nodule        Past Surgical History:   Procedure Laterality Date    BREAST BIOPSY Left     BREAST LUMPECTOMY      LUMPECTOMY,BREAST,WITH RADIOACTIVE SEED LOCALIZATION Left 09/04/2020    Procedure: LUMPECTOMY,BREAST,WITH RADIOACTIVE SEED LOCALIZATION reflector placed on 9/2/20;  Surgeon: Los Benites MD;  Location: [facility];  Service: General;  Laterality: Left;    ORIF left lower leg      TONSILLECTOMY         Family History   Problem Relation Age of Onset    Dementia Mother     Heart attack Father     Melanoma Neg Hx     Breast cancer Neg Hx     Colon cancer Neg Hx     Ovarian cancer Neg Hx      Review of Systems:  ROS:  Constitutional: no fever or chills  Eyes: no visual changes  ENT: no nasal congestion or sore throat  Respiratory: no cough or shortness of breath  Cardiovascular: no chest pain or palpitations  Gastrointestinal: no nausea or vomiting, tolerating diet  Genitourinary: no hematuria or dysuria  Integument/Breast: no rash or pruritis  Hematologic/Lymphatic: no easy bruising or lymphadenopathy  Musculoskeletal: no arthralgias or myalgias  Neurological: no seizures or tremors  Behavioral/Psych: no auditory or visual hallucinations  Endocrine: no heat or cold intolerance      OBJECTIVE:     PHYSICAL EXAM:  Vital Signs (Most Recent)  There were no vitals filed for this visit.  Height: 5' 2" (157.5 cm) Weight: 66.7 kg (147 lb 0.8 oz),   Estimated body mass index is 26.9 kg/m² as calculated from the following:    Height as of this encounter: 5' 2" (1.575 m).    Weight as of this encounter: " 66.7 kg (147 lb 0.8 oz).   General Appearance: Well nourished, well developed, in no acute distress.  HENT: Normal cephalic, oropharynx pink and moist  Eyes: PERRLA bilaterally and EOM x 4  Respiratory: Even and unlabored  Skin: Warm and Dry.   Psychiatric: AAO x 4, Mood & affect are normal.    Shoulder exam: left  Tenderness: none  ROM: normal ROM of fingers, wrist, and elbow.  Can do pendulums.  ROM of shoulder is 0-150.  Shoulder Strength: 4/5  sensory exam normal and radial pulse intact      RADIOGRAPHS:  X-ray of the left shoulder was obtained, no acute fractures or dislocation seen.  Per radiologist, there appears to be a chronic rotator cuff tear due to her high-riding shoulder.  All radiographs were personally reviewed by me.    ASSESSMENT/PLAN:       ICD-10-CM ICD-9-CM   1. Traumatic anterior dislocation of left shoulder, subsequent encounter  S43.015D V58.89     831.01       -Nettie CESAR Villasenor presents to clinic for emergency department follow-up following a traumatic anterior dislocation of the left shoulder dated November 19, 2022.  Due to a fall at the nursing facility.  -X-ray as above.  -Recommend RICE therapy.  -Currently has no pain.  -May continue PT/OT and weight bear as tolerated and ROMAT.    -Follow up PRN.    No future appointments.

## 2023-01-26 ENCOUNTER — TELEPHONE (OUTPATIENT)
Dept: SPEECH THERAPY | Facility: HOSPITAL | Age: 83
End: 2023-01-26
Payer: MEDICARE

## 2023-01-26 NOTE — TELEPHONE ENCOUNTER
Gustabo alcala at [facility]. Pt did not need a swallow study.----- Message from Ernestine Brower MA, CCC-SLP sent at 1/25/2023  4:28 PM CST -----  Regarding: FW: appt    ----- Message -----  From: Tremontana Chevalier  Sent: 1/25/2023   8:29 AM CST  To: [facility] Speech Clinical Support  Subject: jose alfredo Hinds calling from [facility] to [facility] appt with speech for swallow study. Pls call nurses station at [facility] @ [phone] or  @ [phone].

## 2023-02-06 ENCOUNTER — DOCUMENTATION ONLY (OUTPATIENT)
Dept: PSYCHIATRY | Facility: CLINIC | Age: 83
End: 2023-02-06
Payer: MEDICARE

## 2023-02-06 NOTE — PROGRESS NOTES
"[facility] 2/6/23    Plan at last appointment on 2/22/2022:  Symptoms well controlled at this time.  Continue Prozac 20mg daily and Abilify 2mg qHS.  Discussed with patient and  informed consent, risks versus benefits, alternative treatments, side effect profile and the inherent unpredictability of individual responses to these treatments.  The patient and  understanding of the above and agree with this current plan.     Met with patient, Jessenia, and Dr. Jha  in person at [facility].  Patient says she is doing well. Eating and sleeping well.  Talking about her nephew, Buzz, who lives in [place].  Denies feeling sad - down at times, misses Abril who she lost to breast cancer.  Gets along well with house mates.  Enjoys being with her nephews.  No behavioral issues.  Patient calm and cooperative during interview.  She reports her mood is "fine."  Tearful when discussing Abril but otherwise affect is euthymic.  Tangential at times but redirectable (baseline).  No involuntary movements.  Hospitalized in November after a fall that lead to shoulder dislocation.  In wheelchair more, but normally walks with walker.    No medication changes today.  Continue Prozac 20mg daily and Abilify 2mg qHS.  Follow up in 1 year.    "

## 2023-03-15 ENCOUNTER — INFUSION (OUTPATIENT)
Dept: INFECTIOUS DISEASES | Facility: HOSPITAL | Age: 83
End: 2023-03-15
Attending: INTERNAL MEDICINE
Payer: MEDICARE

## 2023-03-15 VITALS
RESPIRATION RATE: 18 BRPM | OXYGEN SATURATION: 96 % | SYSTOLIC BLOOD PRESSURE: 134 MMHG | BODY MASS INDEX: 26.89 KG/M2 | WEIGHT: 147 LBS | HEART RATE: 86 BPM | DIASTOLIC BLOOD PRESSURE: 61 MMHG | TEMPERATURE: 97 F

## 2023-03-15 DIAGNOSIS — M81.0 AGE-RELATED OSTEOPOROSIS WITHOUT CURRENT PATHOLOGICAL FRACTURE: Primary | ICD-10-CM

## 2023-03-15 PROCEDURE — 96372 THER/PROPH/DIAG INJ SC/IM: CPT

## 2023-03-15 PROCEDURE — 63600175 PHARM REV CODE 636 W HCPCS: Mod: JZ,JG | Performed by: INTERNAL MEDICINE

## 2023-03-15 RX ADMIN — DENOSUMAB 60 MG: 60 INJECTION SUBCUTANEOUS at 10:03

## 2023-03-15 NOTE — PROGRESS NOTES
Pt received 1st prolia inj to right arm SQ, pt takes VIT D and calcium, pt denies dental surgery < 3 months, pt observed for 15 min after inj, pt tolerated well & left with sitter via WC in NAD

## 2023-05-03 ENCOUNTER — OFFICE VISIT (OUTPATIENT)
Dept: INTERNAL MEDICINE | Facility: CLINIC | Age: 83
End: 2023-05-03
Payer: MEDICARE

## 2023-05-03 VITALS
BODY MASS INDEX: 26.89 KG/M2 | OXYGEN SATURATION: 99 % | SYSTOLIC BLOOD PRESSURE: 128 MMHG | DIASTOLIC BLOOD PRESSURE: 60 MMHG | HEIGHT: 62 IN | HEART RATE: 86 BPM

## 2023-05-03 DIAGNOSIS — C50.912 INFILTRATING DUCTAL CARCINOMA OF LEFT BREAST: ICD-10-CM

## 2023-05-03 DIAGNOSIS — M25.619 DECREASED RANGE OF MOTION OF SHOULDER, UNSPECIFIED LATERALITY: ICD-10-CM

## 2023-05-03 DIAGNOSIS — F20.9 SCHIZOPHRENIA, UNSPECIFIED TYPE: ICD-10-CM

## 2023-05-03 DIAGNOSIS — R26.9 GAIT ABNORMALITY: ICD-10-CM

## 2023-05-03 DIAGNOSIS — Z00.00 ROUTINE GENERAL MEDICAL EXAMINATION AT A HEALTH CARE FACILITY: Primary | ICD-10-CM

## 2023-05-03 PROCEDURE — 1159F MED LIST DOCD IN RCRD: CPT | Mod: CPTII,S$GLB,, | Performed by: INTERNAL MEDICINE

## 2023-05-03 PROCEDURE — 99397 PR PREVENTIVE VISIT,EST,65 & OVER: ICD-10-PCS | Mod: S$GLB,,, | Performed by: INTERNAL MEDICINE

## 2023-05-03 PROCEDURE — 3074F SYST BP LT 130 MM HG: CPT | Mod: CPTII,S$GLB,, | Performed by: INTERNAL MEDICINE

## 2023-05-03 PROCEDURE — 1126F PR PAIN SEVERITY QUANTIFIED, NO PAIN PRESENT: ICD-10-PCS | Mod: CPTII,S$GLB,, | Performed by: INTERNAL MEDICINE

## 2023-05-03 PROCEDURE — 99499 UNLISTED E&M SERVICE: CPT | Mod: HCNC,S$GLB,, | Performed by: INTERNAL MEDICINE

## 2023-05-03 PROCEDURE — 1101F PR PT FALLS ASSESS DOC 0-1 FALLS W/OUT INJ PAST YR: ICD-10-PCS | Mod: CPTII,S$GLB,, | Performed by: INTERNAL MEDICINE

## 2023-05-03 PROCEDURE — 1101F PT FALLS ASSESS-DOCD LE1/YR: CPT | Mod: CPTII,S$GLB,, | Performed by: INTERNAL MEDICINE

## 2023-05-03 PROCEDURE — 3288F FALL RISK ASSESSMENT DOCD: CPT | Mod: CPTII,S$GLB,, | Performed by: INTERNAL MEDICINE

## 2023-05-03 PROCEDURE — 3074F PR MOST RECENT SYSTOLIC BLOOD PRESSURE < 130 MM HG: ICD-10-PCS | Mod: CPTII,S$GLB,, | Performed by: INTERNAL MEDICINE

## 2023-05-03 PROCEDURE — 3078F DIAST BP <80 MM HG: CPT | Mod: CPTII,S$GLB,, | Performed by: INTERNAL MEDICINE

## 2023-05-03 PROCEDURE — 3288F PR FALLS RISK ASSESSMENT DOCUMENTED: ICD-10-PCS | Mod: CPTII,S$GLB,, | Performed by: INTERNAL MEDICINE

## 2023-05-03 PROCEDURE — 3078F PR MOST RECENT DIASTOLIC BLOOD PRESSURE < 80 MM HG: ICD-10-PCS | Mod: CPTII,S$GLB,, | Performed by: INTERNAL MEDICINE

## 2023-05-03 PROCEDURE — 99999 PR PBB SHADOW E&M-EST. PATIENT-LVL IV: CPT | Mod: PBBFAC,,, | Performed by: INTERNAL MEDICINE

## 2023-05-03 PROCEDURE — 1159F PR MEDICATION LIST DOCUMENTED IN MEDICAL RECORD: ICD-10-PCS | Mod: CPTII,S$GLB,, | Performed by: INTERNAL MEDICINE

## 2023-05-03 PROCEDURE — 99999 PR PBB SHADOW E&M-EST. PATIENT-LVL IV: ICD-10-PCS | Mod: PBBFAC,,, | Performed by: INTERNAL MEDICINE

## 2023-05-03 PROCEDURE — 99397 PER PM REEVAL EST PAT 65+ YR: CPT | Mod: S$GLB,,, | Performed by: INTERNAL MEDICINE

## 2023-05-03 PROCEDURE — 99499 RISK ADDL DX/OHS AUDIT: ICD-10-PCS | Mod: HCNC,S$GLB,, | Performed by: INTERNAL MEDICINE

## 2023-05-03 PROCEDURE — 1126F AMNT PAIN NOTED NONE PRSNT: CPT | Mod: CPTII,S$GLB,, | Performed by: INTERNAL MEDICINE

## 2023-05-03 RX ORDER — LORAZEPAM 0.5 MG/1
0.5 TABLET ORAL EVERY 12 HOURS PRN
COMMUNITY

## 2023-05-03 NOTE — PROGRESS NOTES
CHIEF COMPLAINT: Annual exam     HPI: This is a 82 year old woman from [place] who presents for her annual exam.    She is with her , Jessenia today    She was hospitalized from 11/18/22 to 11/21/22 due to a fall. She had a anterior dislocation of her left shoulder, as well as, a dislocated fracture of her L 3rd rib and a minimally displaced fracture of her L 2nd rib w/ associated L hydropneumothorax. She has recovered from this injury. She has occasional neck and shoulder pain.  She has some decreased range of motion of the left shoulder and needs some occupational therapy    She was diagnosed with breast cancer - ER+, IN+, Her2- IDC of the left breast s/p left breast lumpectomy on 9/4/20. Negative margins. Adjuvant XRT was deferred. Patient started adjuvant endocrine therapy in October of 2020.  She is now taking anastrozole 1 mg daily.     Her feet go to sleep intermittently during the day when she sits too long. Her feet do not go to sleep as much when she is up and moving around.   No neck pain.  She walks with her neck flexed - has been like this for a very long time. She has slight knee pain which comes and goes. She is taking tylenol in the evening which is helping.      Nettie got first Prolia injections for her osteoporosis on 3/15/23. Alendronate was stopped due to fracture from fall.  BMD 11/2022 -osteoporosis - hip had worsened- Alendronate was stopped 12/2022     Her anxiety has been controlled on Prozac 20 mg daily and abilify 2 mg daily. No depression. Sleeping well. She denies hallucinations or insomnia. She continues to like to live in her group home at [place]. Stable thyroid ultrasound 4/17. .           PAST MEDICAL HISTORY:   1. Mild mentally handicapped.   2. Schizophrenia.   3. A thyroid nodule, status post fine needle aspirate on 05/15/2003 , 09/2003 and 12/2006 (negative). Ultrasound in 2011 was stable from 2006  4. History of chest pain with a negative stress  "echocardiogram in 2003.   5. Osteopenia on bone mineral density 2004, improved in bone density . No treatmetn 2015  6. Breast cancer diagnosed 2020 - ER+, TX+, Her2- IDC of the left breast s/p left breast lumpectomy on 20. Negative margins. Adjuvant XRT was deferred. Patient started adjuvant endocrine therapy in 2020.     PAST SURGICAL HISTORY: Tonsillectomy and status post ORIF of the left leg.    MEDICATIONS: updated on epic    No known drug allergies.     SOCIAL HISTORY: No alcohol or tobacco. Resident of [facility].    FAMILY HISTORY: Her father  in his late 30s of a heart attack. Her mother  of Alzheimer's. A brother  in an explosion.         PHYSICAL EXAM:      /60 (BP Location: Right arm, Patient Position: Sitting, BP Method: Small (Manual))   Pulse 86   Ht 5' 2" (1.575 m)   SpO2 99%   BMI 26.89 kg/m²     GENERAL: She is alert, oriented, in no apparent distress.   Affect within normal limits. Conjunctivae anicteric. Pupils equal, round, and reactive   to light. EOMI. Tympanic membranes clear. Oropharynx clear.   NECK: Supple. No cervical lymphadenopathy. Left lobe of the thyroid was enlarged.. No JVD.   RESPIRATORY: Effort normal.   LUNGS: Clear to auscultation.   HEART: Regular rate and rhythm without murmurs, gallops, or rubs. No lower extremity edema.         ASSESSMENT AND PLAN:   1. Annual exam - discussed diet, exercise and safety issues.    2. Thyroid nodule - us thyroid  3. Lung nodule - Ct chest  4. Gait abnormality - physical therapy  5. Mild mentally handicapped, resident of [facility]. A 90L Form filled out.   6. Schizophrenia, doing well   7. Osteoporosis :NOw on Prolia injections - l ast injection 3/15/23  8. Breast cancer -  on anastrazole  9. Left shoulder decreased range of motion- occupational therapy      Screening. [facility]   She had a normal colonscopy May 2009  I will see Nettie back in 1 year, sooner if problems arise   I " see her every 2 weeks at [place]

## 2023-05-09 ENCOUNTER — TELEPHONE (OUTPATIENT)
Dept: INTERNAL MEDICINE | Facility: CLINIC | Age: 83
End: 2023-05-09
Payer: MEDICARE

## 2023-05-09 NOTE — TELEPHONE ENCOUNTER
----- Message from Yoly LUA May sent at 5/8/2023 10:27 AM CDT -----  Regarding: appt  Contact: Lizet Gentile @[phone] or [phone]  Caller requesting call back in regards to getting PT type appts scheduled. Pls call to discuss.

## 2023-05-16 ENCOUNTER — LAB VISIT (OUTPATIENT)
Dept: LAB | Facility: HOSPITAL | Age: 83
End: 2023-05-16
Attending: INTERNAL MEDICINE
Payer: MEDICARE

## 2023-05-16 DIAGNOSIS — Z79.899 ENCOUNTER FOR LONG-TERM (CURRENT) USE OF OTHER MEDICATIONS: Primary | ICD-10-CM

## 2023-05-16 LAB
ALBUMIN SERPL BCP-MCNC: 3.8 G/DL (ref 3.5–5.2)
ALP SERPL-CCNC: 65 U/L (ref 55–135)
ALT SERPL W/O P-5'-P-CCNC: 15 U/L (ref 10–44)
ANION GAP SERPL CALC-SCNC: 9 MMOL/L (ref 8–16)
AST SERPL-CCNC: 17 U/L (ref 10–40)
BASOPHILS # BLD AUTO: 0.03 K/UL (ref 0–0.2)
BASOPHILS NFR BLD: 0.7 % (ref 0–1.9)
BILIRUB SERPL-MCNC: 0.3 MG/DL (ref 0.1–1)
BUN SERPL-MCNC: 30 MG/DL (ref 8–23)
CALCIUM SERPL-MCNC: 9.3 MG/DL (ref 8.7–10.5)
CHLORIDE SERPL-SCNC: 108 MMOL/L (ref 95–110)
CO2 SERPL-SCNC: 26 MMOL/L (ref 23–29)
CREAT SERPL-MCNC: 0.8 MG/DL (ref 0.5–1.4)
DIFFERENTIAL METHOD: ABNORMAL
EOSINOPHIL # BLD AUTO: 0.1 K/UL (ref 0–0.5)
EOSINOPHIL NFR BLD: 2.3 % (ref 0–8)
ERYTHROCYTE [DISTWIDTH] IN BLOOD BY AUTOMATED COUNT: 13.4 % (ref 11.5–14.5)
EST. GFR  (NO RACE VARIABLE): >60 ML/MIN/1.73 M^2
GLUCOSE SERPL-MCNC: 99 MG/DL (ref 70–110)
HCT VFR BLD AUTO: 34.8 % (ref 37–48.5)
HGB BLD-MCNC: 11.1 G/DL (ref 12–16)
IMM GRANULOCYTES # BLD AUTO: 0.01 K/UL (ref 0–0.04)
IMM GRANULOCYTES NFR BLD AUTO: 0.2 % (ref 0–0.5)
LYMPHOCYTES # BLD AUTO: 1.6 K/UL (ref 1–4.8)
LYMPHOCYTES NFR BLD: 37.4 % (ref 18–48)
MCH RBC QN AUTO: 32.6 PG (ref 27–31)
MCHC RBC AUTO-ENTMCNC: 31.9 G/DL (ref 32–36)
MCV RBC AUTO: 102 FL (ref 82–98)
MONOCYTES # BLD AUTO: 0.4 K/UL (ref 0.3–1)
MONOCYTES NFR BLD: 8.8 % (ref 4–15)
NEUTROPHILS # BLD AUTO: 2.2 K/UL (ref 1.8–7.7)
NEUTROPHILS NFR BLD: 50.6 % (ref 38–73)
NRBC BLD-RTO: 0 /100 WBC
PLATELET # BLD AUTO: 212 K/UL (ref 150–450)
PMV BLD AUTO: 12.4 FL (ref 9.2–12.9)
POTASSIUM SERPL-SCNC: 4.7 MMOL/L (ref 3.5–5.1)
PROT SERPL-MCNC: 7.3 G/DL (ref 6–8.4)
RBC # BLD AUTO: 3.41 M/UL (ref 4–5.4)
SODIUM SERPL-SCNC: 143 MMOL/L (ref 136–145)
TSH SERPL DL<=0.005 MIU/L-ACNC: 0.75 UIU/ML (ref 0.4–4)
WBC # BLD AUTO: 4.3 K/UL (ref 3.9–12.7)

## 2023-05-16 PROCEDURE — 84443 ASSAY THYROID STIM HORMONE: CPT | Performed by: INTERNAL MEDICINE

## 2023-05-16 PROCEDURE — 80053 COMPREHEN METABOLIC PANEL: CPT | Performed by: INTERNAL MEDICINE

## 2023-05-16 PROCEDURE — 36415 COLL VENOUS BLD VENIPUNCTURE: CPT | Performed by: INTERNAL MEDICINE

## 2023-05-16 PROCEDURE — 85025 COMPLETE CBC W/AUTO DIFF WBC: CPT | Performed by: INTERNAL MEDICINE

## 2023-05-23 ENCOUNTER — CLINICAL SUPPORT (OUTPATIENT)
Dept: REHABILITATION | Facility: HOSPITAL | Age: 83
End: 2023-05-23
Attending: INTERNAL MEDICINE
Payer: MEDICARE

## 2023-05-23 DIAGNOSIS — M25.512 LEFT SHOULDER PAIN, UNSPECIFIED CHRONICITY: ICD-10-CM

## 2023-05-23 DIAGNOSIS — R26.9 GAIT ABNORMALITY: ICD-10-CM

## 2023-05-23 DIAGNOSIS — M25.619 DECREASED RANGE OF MOTION OF SHOULDER, UNSPECIFIED LATERALITY: ICD-10-CM

## 2023-05-23 DIAGNOSIS — R26.89 IMPAIRED GAIT AND MOBILITY: ICD-10-CM

## 2023-05-23 PROCEDURE — 97165 OT EVAL LOW COMPLEX 30 MIN: CPT | Mod: PO

## 2023-05-23 PROCEDURE — 97162 PT EVAL MOD COMPLEX 30 MIN: CPT | Mod: PO

## 2023-05-23 PROCEDURE — 97530 THERAPEUTIC ACTIVITIES: CPT | Mod: PO

## 2023-05-23 NOTE — PLAN OF CARE
[facility]  Physical Therapy Neurological Rehabilitation Initial Evaluation     Name: Nettie Villasenor  Clinic Number: [id number]    Therapy Diagnosis:   Encounter Diagnoses   Name Primary?    Gait abnormality     Impaired gait and mobility      Physician: Carolina Chavez MD    Physician Orders: PT Eval and Treat    Medical Diagnosis from Referral: R26.9 (ICD-10-CM) - Gait abnormality   Evaluation Date: 5/23/2023  Authorization Period Expiration: 12/29/2023  Plan of Care Expiration: 7/21/23  Progress Note Due: 6/23/23  Visit # / Visits authorized: 1/ 1  FOTO: 1/3 (completed by [facility] staff)    Precautions: Standard and Fall, s/p left breast lumpectomy on 9/4/20.     Time In: 1116  Time Out: 1200  Total Billable Time: 44 minutes    Subjective      Date of onset: Gradual decline over past 6 months.     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 Nettie uses a Rollator in the facility. Walks to porch or dining room from room in facility. Here to work on balance/stability. Has had multiple falls but unable to recall exact number - fair/poor historian.        Prior Therapy: Past home health physical therapy   Social History:   lives in an adult home;   , [facility] at [facility]  Falls: yes, see above   DME: Rollator and transport wheelchair     Home Environment: Handicap accessible group home.    Exercise Routine / History: Exercise  comes in 3 days a week - optional for residents. Reports she goes sometimes.    Family Present at time of Eval: [facility]     Occupation: n/a  Prior Level of Function: Was walking further distances without falls.   Current Level of Function: Still attempting limited ambulation but high fall risk.     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    Medications:   Nettie has a current medication list which includes the following prescription(s): acetaminophen, anastrozole, aripiprazole, aspirin, aspirin, calcium carbonate, docusate sodium, fexofenadine, fluoxetine, lorazepam, multivitamin, and vitamin d.    Allergies:   Review of patient's allergies indicates:  No Known Allergies     Objective      Coordination: Slowed/delayed     Lower Extremity Strength - needs multiple cues for proper direction but strong once fires.   Right LE  Left LE    Hip flexion: 4+/5 Hip flexion: 4+/5   Hip abduction: 5/5 Hip abduction: 5/5   Hip adduction: 4+/5 Hip adduction 4+/5   Knee extension: 4/5 Knee extension: 4/5   Knee flexion: >3/5 - unable to understand command and give effort Knee flexion: >3/5 - unable to understand command and give effort   Ankle dorsiflexion: >3/5 - unable to understand command and give effort Ankle dorsiflexion: >3/5 - unable to understand command and give effort       Lower extremity PROM/AROM: bilateral intact     Sit<>stands: SBA   30s chair rise test: 4 reps with bilateral upper extremity assist    Sitting balance static: good  Sitting balance dynamic: good  Standing balance static: fair (unable to let go of Rollator for any seconds)   Standing balance dynamic:  fair    Gait: Ambulated 65' with CGA, Juan for Rollator negotiation/obstacle negotiation. Limited by pt fatigue.     GAIT DEVIATIONS:  Nettie displays the following deviations with ambulation:  Abnormal, decreased aquiles,  decreased stride length, decreased toe-to-floor clearance, decreased weight-shifting ability, and narrow base of support       Impairments contributing to deviations/impairments: impaired balance, abnormal muscle tone, decreased strength, and decreased endurance    Bed mobility (reported) - Luis with bed rail added by past home health physical therapy     CMS Impairment/Limitation/Restriction for FOTO Balance Survey (completed by [facility] Staff)    Therapist reviewed FOTO scores for Nettie Villasenor on 5/23/2023.   FOTO documents entered into [employer] - see Media section.    Limitation Score: 63%       Treatment   No treatment provided today. All time spent on evaluation.     Home Exercises and Patient Education Provided    Education provided: POC, scheduling, goals of therapy    Written Home Exercises Provided: No. To be established at initial follow up session.      Patient Education and Home Exercises     Education provided: Goals of PT, scheduling    Written Home Exercises Provided:  Will be given at first follow up .       Assessment     Nettie is a 82 y.o. female referred to outpatient Physical Therapy with a medical diagnosis of gait abnormality. Patient presents with significantly impaired gait and balance due to general debility and hx of falls. She has extensive medical hx as listed above but notably hx of left ORIF, breast cancer and Schizophrenia. She lives in a group home and is currently more reliant on the transport wheelchair due to recent falls. She does still walk room-dining room with Rollator and other short in home distances but at high risk for falls. Skilled PT is warranted to address lower extremity strength and endurance, balance and gait performance all to increase independence and decrease fall risk. Pt agreeable to plan of care (POC).     Patient prognosis is Fair.   Patient will benefit from skilled outpatient Physical Therapy to address the deficits stated above and in the chart below, provide  patient /family education, and to maximize patientt's level of independence.     Plan of care discussed with patient: Yes  Patient's spiritual, cultural and educational needs considered and patient is agreeable to the plan of care and goals as stated below:     Anticipated Barriers for therapy: dependent on transportation, complex medical and cognitive co-morbidities     Medical Necessity is demonstrated by the following  History  Co-morbidities and personal factors that may impact the plan of care [] LOW: no personal factors / co-morbidities  [] MODERATE: 1-2 personal factors / co-morbidities  [x] HIGH: 3+ personal factors / co-morbidities    Moderate / High Support Documentation:   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      Clinical Presentation [] LOW: stable  [x] MODERATE: Evolving  [] HIGH: Unstable     Decision Making/ Complexity Score: moderate       Goals:  Short Term Goals: 4 weeks   Pt will improve 30s chair rise score to at least 5 reps with UE assist for improved muscular endurance.   Pt will improve FOTO limitation score to </= 55% for improved self perception of functional mobility.  Pt will improve sit<>stand performance to supervision assist or less.  Pt will improve static standing balance unsupported to 30s without loss of balance to increase participation in ADLs.   Pt will be able to ambulate 150 feet with Rollator or most appropriate gait assistive device without loss of balance or standing  rest break for increased independence in the home with mobility.       Long Term Goals: 8 weeks   Pt will improve 30s chair rise score to at least 6 reps with UE assist for improved muscular endurance.   Pt will improve FOTO limitation score to </= 50% for improved self perception of functional mobility.  Pt will improve sit<>stand performance to Luis assist or less.  Pt will improve static standing balance unsupported to 2 min without loss of balance to increase participation in ADLs.   Pt will be able to ambulate 300 feet with Rollator or most appropriate gait assistive device without loss of balance or standing rest break for increased independence in the home with mobility.       Plan     Plan of care Certification: 5/23/2023 to 7/21/23.    Outpatient Physical Therapy 2 times weekly for 8 weeks to include the following interventions: Gait Training, Manual Therapy, Moist Heat/ Ice, Neuromuscular Re-ed, Orthotic Management and Training, Patient Education, Therapeutic Activities, and Therapeutic Exercise.     Loly Lea, PT

## 2023-05-24 ENCOUNTER — PES CALL (OUTPATIENT)
Dept: ADMINISTRATIVE | Facility: CLINIC | Age: 83
End: 2023-05-24
Payer: MEDICARE

## 2023-05-26 PROBLEM — M25.512 LEFT SHOULDER PAIN: Status: ACTIVE | Noted: 2023-05-26

## 2023-05-26 NOTE — PLAN OF CARE
[facility] Occupational Therapy  Initial Evaluation     Date: 5/23/2023  Patient: Nettie Villasenor  Chart Number: [id number]  Referring Physician: Carolina Chavez MD  Therapy Diagnosis:   1. Decreased range of motion of shoulder, unspecified laterality  Ambulatory referral/consult to Physical/Occupational Therapy      2. Left shoulder pain, unspecified chronicity            Medical Diagnosis: M25.619 (ICD-10-CM) - Decreased range of motion of shoulder, unspecified laterality  Physician Orders: Eval/tx  Evaluation Date: 5/23/2023  Plan of Care Certification Date: 8/23/2023  Authorization Period: 12/29/2023  Surgery Date and Procedure: N/A  Date of Return to MD: RAUL    Visit #: 1 of 1  Time In: 2:30 PM  Time Out: 3:15 PM  Total Billable Time: 45 min    Precautions: Standard, Fall    Subjective     Involved Side: Left Shoulder  Dominant Side: Right   Date of Onset: Nov 2022  History of Current Condition: I was leaving my room to go to the bathroom, I was trying to turn around when she fell trying to move the walker from the wall. I also fractured multiple ribs.   Previous Therapy: Home Health     Patient's Goals for Therapy: I want my pain to be less and I want to move my shoulder     Pain:  Functional Pain Scale Rating 0-10:   5/10 on average  5/10 at best  5/10 at worst  *Pt requires moderate verbal cuing to return to task and to answer specific questions.     Previous Level of function Able to dress independently, feed independently, participate in hygiene tasks independently     Current Level of Function: Moderate assistance for dressing, Mod I for brushing teeth and feeding    Occupation:  N/A    Past Medical History/Physical Systems Review:   Nettie Villasenor  has a past medical history of Breast cancer, Cataracts, bilateral, Mental disability, Mild mental retardation, Osteopenia, Schizophrenia, and Thyroid nodule.    Nettie Villasenor  has a past surgical history that includes Tonsillectomy; ORIF left  lower leg; LUMPECTOMY,BREAST,WITH RADIOACTIVE SEED LOCALIZATION (Left, 09/04/2020); Breast lumpectomy; and Breast biopsy (Left).    Nettie has a current medication list which includes the following prescription(s): acetaminophen, anastrozole, aripiprazole, aspirin, aspirin, calcium carbonate, docusate sodium, fexofenadine, fluoxetine, lorazepam, multivitamin, and vitamin d.    Review of patient's allergies indicates:  No Known Allergies       Objective     Mental status: interactive, but easily distracted and requires moderate cuing to return to task    Observation:   Pt presents in wheelchair    UE Range of Motion  Passive Range of Motion:   Shoulder Left Right   Flexion 95 110   Abduction 75 75      Active Range of Motion:   Pt is unable to achieve any active motion 2* easily distractable and guarded. With attempts to flex or abduct pt         Treatment     Treatment Time In: 3p  Treatment Time Out: 315p  Total Treatment time separate from Evaluation time:15 min    Nettie participated in dynamic functional therapeutic activities to improve functional performance for 15  minutes, including:  -Patient performed therapist assisted forward flexion pulleys for 3 min for stretching and to increase shoulder ROM and mobility.    -table slides/stretch facing forward x 15 reps     Home Exercise Program/Education:  Issued HEP (see patient instructions in EMR) and educated on modality use for pain management . Exercises were reviewed and Nettie was able to demonstrate them prior to the end of the session.   Pt received a written copy of exercises to perform at home. Nettie demonstrated poor understanding of the education provided.  Pt was advised to perform these exercises free of pain, and to stop performing them if pain occurs.    Patient/Family Education: role of OT, goals for OT, scheduling/cancellations - pt verbalized understanding. Discussed insurance limitations with patient.      Assessment     Nettie Villasenor is a 82 y.o.  female presents with limitations as described in problem list. Patient can benefit from Occupational Therapy services for Iontophoresis, ultrasound, moist heat, therapeutic exercises, home exercise program provied with written instructions, ice and strengthening and orthotics, if deemed necessary . The following goals were discussed with the patient and she is in agreement with them as to be addressed in the treatment plan.    The patient's rehab potential is Fair/Guarded.     Anticipated barriers to occupational therapy: Increased dis tractability and poor carryover   Pt has no cultural, educational or language barriers to learning provided.    Goals:   1)   Patient to be IND with HEP and modalities for pain/edema managment.  2)   Increase ROM 10 in passive flexion degrees to increase functional hand use for ADLs/work/leisure activities.  3)   Assess active ROM of shoulder   4)   Assess .   6)   Decrease complaints of pain to  3 out of 10 at worst to increase functional hand use for ADL/work/leisure activities.          Plan     Pt to be treated by Occupational Therapy 2 times per week for 8 weeks during the certification period from 5/23/2023 to 7/21/23 to achieve the established goals.     Treatment to include: Manual therapy/joint mobilizations, Modalities for pain management, Therapeutic exercises/activities., Strengthening, Joint Protection, and Energy Conservation, as well as any other treatments deemed necessary based on the patient's needs or progress.     Genevieve Shelton  OTR/L, CHT  Occupational therapist, Certified Hand Therapist

## 2023-08-10 DIAGNOSIS — Z12.31 SCREENING MAMMOGRAM FOR BREAST CANCER: Primary | ICD-10-CM

## 2023-08-16 DIAGNOSIS — C50.912 INFILTRATING DUCTAL CARCINOMA OF LEFT BREAST: Primary | ICD-10-CM

## 2023-08-17 RX ORDER — ANASTROZOLE 1 MG/1
TABLET ORAL
Qty: 30 TABLET | Refills: 0 | Status: SHIPPED | OUTPATIENT
Start: 2023-08-17 | End: 2023-10-27

## 2023-08-30 ENCOUNTER — HOSPITAL ENCOUNTER (OUTPATIENT)
Dept: RADIOLOGY | Facility: HOSPITAL | Age: 83
Discharge: HOME | End: 2023-08-30
Attending: INTERNAL MEDICINE
Payer: MEDICARE

## 2023-08-30 DIAGNOSIS — Z12.31 SCREENING MAMMOGRAM FOR BREAST CANCER: ICD-10-CM

## 2023-08-30 PROCEDURE — 77067 SCR MAMMO BI INCL CAD: CPT | Mod: 26,HCNC,, | Performed by: RADIOLOGY

## 2023-08-30 PROCEDURE — 77067 MAMMO DIGITAL SCREENING BILAT WITH TOMO: ICD-10-PCS | Mod: 26,HCNC,, | Performed by: RADIOLOGY

## 2023-08-30 PROCEDURE — 77063 MAMMO DIGITAL SCREENING BILAT WITH TOMO: ICD-10-PCS | Mod: 26,HCNC,, | Performed by: RADIOLOGY

## 2023-08-30 PROCEDURE — 77063 BREAST TOMOSYNTHESIS BI: CPT | Mod: 26,HCNC,, | Performed by: RADIOLOGY

## 2023-08-30 PROCEDURE — 77067 SCR MAMMO BI INCL CAD: CPT | Mod: TC,HCNC

## 2023-09-20 ENCOUNTER — INFUSION (OUTPATIENT)
Dept: INFECTIOUS DISEASES | Facility: HOSPITAL | Age: 83
End: 2023-09-20
Attending: INTERNAL MEDICINE
Payer: MEDICARE

## 2023-09-20 VITALS
SYSTOLIC BLOOD PRESSURE: 129 MMHG | RESPIRATION RATE: 16 BRPM | TEMPERATURE: 99 F | BODY MASS INDEX: 26.9 KG/M2 | OXYGEN SATURATION: 95 % | HEART RATE: 92 BPM | WEIGHT: 147.06 LBS | DIASTOLIC BLOOD PRESSURE: 58 MMHG

## 2023-09-20 DIAGNOSIS — M81.0 AGE-RELATED OSTEOPOROSIS WITHOUT CURRENT PATHOLOGICAL FRACTURE: Primary | ICD-10-CM

## 2023-09-20 PROCEDURE — 96372 THER/PROPH/DIAG INJ SC/IM: CPT | Mod: HCNC

## 2023-09-20 PROCEDURE — 63600175 PHARM REV CODE 636 W HCPCS: Mod: JZ,JG,HCNC | Performed by: INTERNAL MEDICINE

## 2023-09-20 RX ADMIN — DENOSUMAB 60 MG: 60 INJECTION SUBCUTANEOUS at 10:09

## 2023-09-20 NOTE — PROGRESS NOTES
Pt received prolia inj to right arm SQ, pt takes VIT D and calcium, pt denies dental surgery < 3 months,  pt tolerated well & left with sitter via WC in NAD

## 2023-10-10 ENCOUNTER — IMMUNIZATION (OUTPATIENT)
Dept: INTERNAL MEDICINE | Facility: CLINIC | Age: 83
End: 2023-10-10
Payer: MEDICARE

## 2023-10-10 PROCEDURE — 90694 VACC AIIV4 NO PRSRV 0.5ML IM: CPT | Mod: HCNC,S$GLB,, | Performed by: INTERNAL MEDICINE

## 2023-10-10 PROCEDURE — 90694 FLU VACCINE - QUADRIVALENT - ADJUVANTED: ICD-10-PCS | Mod: HCNC,S$GLB,, | Performed by: INTERNAL MEDICINE

## 2023-10-10 PROCEDURE — G0008 FLU VACCINE - QUADRIVALENT - ADJUVANTED: ICD-10-PCS | Mod: HCNC,S$GLB,, | Performed by: INTERNAL MEDICINE

## 2023-10-10 PROCEDURE — G0008 ADMIN INFLUENZA VIRUS VAC: HCPCS | Mod: HCNC,S$GLB,, | Performed by: INTERNAL MEDICINE

## 2023-10-26 DIAGNOSIS — C50.912 INFILTRATING DUCTAL CARCINOMA OF LEFT BREAST: ICD-10-CM

## 2023-10-27 RX ORDER — ANASTROZOLE 1 MG/1
TABLET ORAL
Qty: 30 TABLET | Refills: 10 | Status: SHIPPED | OUTPATIENT
Start: 2023-10-27

## 2023-11-10 DIAGNOSIS — M25.559 HIP PAIN, UNSPECIFIED LATERALITY: Primary | ICD-10-CM

## 2023-11-10 RX ORDER — AMOXICILLIN 875 MG/1
875 TABLET, FILM COATED ORAL EVERY 12 HOURS
Qty: 14 TABLET | Refills: 0 | Status: SHIPPED | OUTPATIENT
Start: 2023-11-10 | End: 2023-11-17

## 2023-11-13 ENCOUNTER — HOSPITAL ENCOUNTER (OUTPATIENT)
Dept: RADIOLOGY | Facility: HOSPITAL | Age: 83
Discharge: HOME | End: 2023-11-13
Attending: INTERNAL MEDICINE
Payer: MEDICARE

## 2023-11-13 DIAGNOSIS — M25.559 HIP PAIN, UNSPECIFIED LATERALITY: ICD-10-CM

## 2023-11-13 PROCEDURE — 73521 X-RAY EXAM HIPS BI 2 VIEWS: CPT | Mod: 26,HCNC,, | Performed by: RADIOLOGY

## 2023-11-13 PROCEDURE — 73521 XR HIPS BILATERAL 2 VIEW INCL AP PELVIS: ICD-10-PCS | Mod: 26,HCNC,, | Performed by: RADIOLOGY

## 2023-11-13 PROCEDURE — 73521 X-RAY EXAM HIPS BI 2 VIEWS: CPT | Mod: TC,HCNC

## 2023-11-14 ENCOUNTER — LAB VISIT (OUTPATIENT)
Dept: LAB | Facility: HOSPITAL | Age: 83
End: 2023-11-14
Attending: INTERNAL MEDICINE
Payer: MEDICARE

## 2023-11-14 DIAGNOSIS — N39.0 URINARY TRACT INFECTION, SITE NOT SPECIFIED: Primary | ICD-10-CM

## 2023-11-14 LAB
BILIRUB UR QL STRIP: NEGATIVE
CLARITY UR REFRACT.AUTO: CLEAR
COLOR UR AUTO: YELLOW
GLUCOSE UR QL STRIP: NEGATIVE
HGB UR QL STRIP: ABNORMAL
KETONES UR QL STRIP: NEGATIVE
LEUKOCYTE ESTERASE UR QL STRIP: NEGATIVE
MICROSCOPIC COMMENT: ABNORMAL
NITRITE UR QL STRIP: NEGATIVE
PH UR STRIP: 6 [PH] (ref 5–8)
PROT UR QL STRIP: NEGATIVE
RBC #/AREA URNS AUTO: 24 /HPF (ref 0–4)
SP GR UR STRIP: 1.02 (ref 1–1.03)
SQUAMOUS #/AREA URNS AUTO: 2 /HPF
URN SPEC COLLECT METH UR: ABNORMAL

## 2023-11-14 PROCEDURE — 81001 URINALYSIS AUTO W/SCOPE: CPT | Mod: HCNC | Performed by: INTERNAL MEDICINE

## 2023-11-14 PROCEDURE — 87086 URINE CULTURE/COLONY COUNT: CPT | Mod: HCNC | Performed by: INTERNAL MEDICINE

## 2023-11-15 LAB — BACTERIA UR CULT: NO GROWTH

## 2023-11-20 ENCOUNTER — HOSPITAL ENCOUNTER (OUTPATIENT)
Dept: RADIOLOGY | Facility: HOSPITAL | Age: 83
Discharge: HOME | End: 2023-11-20
Attending: INTERNAL MEDICINE
Payer: MEDICARE

## 2023-11-20 DIAGNOSIS — M25.559 HIP PAIN, UNSPECIFIED LATERALITY: ICD-10-CM

## 2023-11-20 PROCEDURE — 72100 X-RAY EXAM L-S SPINE 2/3 VWS: CPT | Mod: 26,HCNC,, | Performed by: RADIOLOGY

## 2023-11-20 PROCEDURE — 72100 XR LUMBAR SPINE AP AND LATERAL: ICD-10-PCS | Mod: 26,HCNC,, | Performed by: RADIOLOGY

## 2023-11-20 PROCEDURE — 72100 X-RAY EXAM L-S SPINE 2/3 VWS: CPT | Mod: TC,HCNC

## 2023-11-25 NOTE — PROGRESS NOTES
"Spoke with JOSLYN Rodrigues at [facility] to confirm information provided by pt. Nurse states pt is a "pretty good historian and will answer questions correctly". Confirmed last dose of medications, medical history and NPO status with nurse.  " 115

## 2023-11-26 DIAGNOSIS — M25.559 HIP PAIN, UNSPECIFIED LATERALITY: Primary | ICD-10-CM

## 2023-11-26 DIAGNOSIS — M54.9 BACK PAIN, UNSPECIFIED BACK LOCATION, UNSPECIFIED BACK PAIN LATERALITY, UNSPECIFIED CHRONICITY: ICD-10-CM

## 2024-03-21 ENCOUNTER — INFUSION (OUTPATIENT)
Dept: INFECTIOUS DISEASES | Facility: HOSPITAL | Age: 84
End: 2024-03-21
Payer: MEDICARE

## 2024-03-21 VITALS
BODY MASS INDEX: 27.06 KG/M2 | DIASTOLIC BLOOD PRESSURE: 71 MMHG | SYSTOLIC BLOOD PRESSURE: 139 MMHG | OXYGEN SATURATION: 97 % | WEIGHT: 147.06 LBS | HEART RATE: 78 BPM | TEMPERATURE: 98 F | HEIGHT: 62 IN | RESPIRATION RATE: 20 BRPM

## 2024-03-21 DIAGNOSIS — M81.0 AGE-RELATED OSTEOPOROSIS WITHOUT CURRENT PATHOLOGICAL FRACTURE: Primary | ICD-10-CM

## 2024-03-21 PROCEDURE — 96372 THER/PROPH/DIAG INJ SC/IM: CPT | Mod: HCNC

## 2024-03-21 PROCEDURE — 63600175 PHARM REV CODE 636 W HCPCS: Mod: JZ,JG,HCNC | Performed by: INTERNAL MEDICINE

## 2024-03-21 RX ADMIN — DENOSUMAB 60 MG: 60 INJECTION SUBCUTANEOUS at 11:03

## 2024-05-08 ENCOUNTER — OFFICE VISIT (OUTPATIENT)
Dept: INTERNAL MEDICINE | Facility: CLINIC | Age: 84
End: 2024-05-08
Payer: MEDICARE

## 2024-05-08 VITALS
WEIGHT: 122 LBS | DIASTOLIC BLOOD PRESSURE: 60 MMHG | HEIGHT: 62 IN | HEART RATE: 76 BPM | BODY MASS INDEX: 22.45 KG/M2 | SYSTOLIC BLOOD PRESSURE: 122 MMHG

## 2024-05-08 DIAGNOSIS — Z00.00 ROUTINE GENERAL MEDICAL EXAMINATION AT A HEALTH CARE FACILITY: Primary | ICD-10-CM

## 2024-05-08 DIAGNOSIS — M25.569 KNEE PAIN, UNSPECIFIED CHRONICITY, UNSPECIFIED LATERALITY: ICD-10-CM

## 2024-05-08 DIAGNOSIS — M25.519 SHOULDER PAIN, UNSPECIFIED CHRONICITY, UNSPECIFIED LATERALITY: ICD-10-CM

## 2024-05-08 DIAGNOSIS — F70 MILD INTELLECTUAL DISABILITY: ICD-10-CM

## 2024-05-08 DIAGNOSIS — M81.0 AGE-RELATED OSTEOPOROSIS WITHOUT CURRENT PATHOLOGICAL FRACTURE: ICD-10-CM

## 2024-05-08 DIAGNOSIS — E04.1 THYROID NODULE: ICD-10-CM

## 2024-05-08 DIAGNOSIS — Z12.31 SCREENING MAMMOGRAM FOR BREAST CANCER: ICD-10-CM

## 2024-05-08 DIAGNOSIS — M54.9 BACK PAIN, UNSPECIFIED BACK LOCATION, UNSPECIFIED BACK PAIN LATERALITY, UNSPECIFIED CHRONICITY: ICD-10-CM

## 2024-05-08 DIAGNOSIS — F20.9 SCHIZOPHRENIA, UNSPECIFIED TYPE: ICD-10-CM

## 2024-05-08 DIAGNOSIS — C50.912 INFILTRATING DUCTAL CARCINOMA OF LEFT BREAST: ICD-10-CM

## 2024-05-08 DIAGNOSIS — E55.9 VITAMIN D DEFICIENCY DISEASE: ICD-10-CM

## 2024-05-08 PROCEDURE — 99397 PER PM REEVAL EST PAT 65+ YR: CPT | Mod: HCNC,S$GLB,, | Performed by: INTERNAL MEDICINE

## 2024-05-08 PROCEDURE — 3078F DIAST BP <80 MM HG: CPT | Mod: HCNC,CPTII,S$GLB, | Performed by: INTERNAL MEDICINE

## 2024-05-08 PROCEDURE — 3074F SYST BP LT 130 MM HG: CPT | Mod: HCNC,CPTII,S$GLB, | Performed by: INTERNAL MEDICINE

## 2024-05-08 PROCEDURE — 99999 PR PBB SHADOW E&M-EST. PATIENT-LVL II: CPT | Mod: PBBFAC,HCNC,, | Performed by: INTERNAL MEDICINE

## 2024-05-08 NOTE — PROGRESS NOTES
CHIEF COMPLAINT: Annual exam      HPI: This is a 82 year old woman from [place] who presents for her annual exam.     She is with her ,  today     She was hospitalized from 11/18/22 to 11/21/22 due to a fall. She had a anterior dislocation of her left shoulder, as well as, a dislocated fracture of her L 3rd rib and a minimally displaced fracture of her L 2nd rib w/ associated L hydropneumothorax. She has recovered from this injury. She has occasional neck and shoulder pain.  She has some decreased range of motion of the left shoulder and needs some occupational therapy     She was diagnosed with breast cancer - ER+, SC+, Her2- IDC of the left breast s/p left breast lumpectomy on 9/4/20. Negative margins. Adjuvant XRT was deferred. Patient started adjuvant endocrine therapy in October of 2020.  She is now taking anastrozole 1 mg daily.     Her feet go to sleep intermittently during the day when she sits too long. Her feet do not go to sleep as much when she is up and moving around.   No neck pain.  She walks with her neck flexed - has been like this for a very long time. She has slight knee pain which comes and goes. She is taking tylenol in the evening which is helping.      Nettie got Prolia injections for her osteoporosis on 3/21/24. Alendronate was stopped due to fracture from fall.  BMD 11/2022 -osteoporosis - hip had worsened- Alendronate was stopped 12/2022      Her anxiety has been controlled on Prozac 20 mg daily and abilify 2 mg daily. No depression. Sleeping well. She denies hallucinations or insomnia. She continues to like to live in her group home at [facility]. Stable thyroid ultrasound 4/17. .            PAST MEDICAL HISTORY:   1. Mild mentally handicapped.   2. Schizophrenia.   3. A thyroid nodule, status post fine needle aspirate on 05/15/2003 , 09/2003 and 12/2006 (negative). Ultrasound in 2011 was stable from 2006  4. History of chest pain with a negative stress  echocardiogram in 2003.   5. Osteopenia on bone mineral density 2004, improved in bone density . No treatmetn 2015  6. Breast cancer diagnosed 2020 - ER+, KS+, Her2- IDC of the left breast s/p left breast lumpectomy on 20. Negative margins. Adjuvant XRT was deferred. Patient started adjuvant endocrine therapy in 2020.     PAST SURGICAL HISTORY: Tonsillectomy and status post ORIF of the left leg.    MEDICATIONS: updated on epic    No known drug allergies.     SOCIAL HISTORY: No alcohol or tobacco. Resident of [facility].    FAMILY HISTORY: Her father  in his late 30s of a heart attack. Her mother  of Alzheimer's. A brother  in an explosion.         PHYSICAL EXAM:      GENERAL: She is alert, oriented, in no apparent distress.   Affect within normal limits. Conjunctivae anicteric. Pupils equal, round, and reactive   to light. EOMI. Tympanic membranes clear. Oropharynx clear.   NECK: Supple. No cervical lymphadenopathy. Left lobe of the thyroid was enlarged.. No JVD.   RESPIRATORY: Effort normal.   LUNGS: Clear to auscultation.   HEART: Regular rate and rhythm without murmurs, gallops, or rubs. No lower extremity edema.   ABDOMEN: soft, non distended, non tender, bowel sounds present, no hepatosplenomgaly  BREAST: no abn seen, no nodules palpated, no axillary lad          ASSESSMENT AND PLAN:   1. Annual exam - discussed diet, exercise and safety issues.     2. Thyroid nodule - us thyroid stable 2022  3. Lung nodule - Ct chest 2022 and 2022  4. Gait abnormality with back pain and knee pain - home health physical therapy  5. Mild mentally handicapped, resident of [facility]. A 90L Form filled out.   6. Schizophrenia, doing well   7. Osteoporosis :NOw on Prolia injections - l ast injection 3/21/24  8. Breast cancer -  on anastrazole  9. Left shoulder decreased range of motion- occupational therapy      Screening. MMG   She had a normal colonscopy May  2009  I will see Nettie back in 1 year, sooner if problems arise I see her every 2 weeks at [facility]

## 2024-05-10 ENCOUNTER — TELEPHONE (OUTPATIENT)
Dept: INTERNAL MEDICINE | Facility: CLINIC | Age: 84
End: 2024-05-10
Payer: MEDICARE

## 2024-05-10 NOTE — TELEPHONE ENCOUNTER
----- Message from Carolina Chavez MD sent at 5/9/2024  5:47 PM CDT -----  Please send home health orders to [facility]  Carolina Chavez M.D.

## 2024-06-10 ENCOUNTER — PATIENT MESSAGE (OUTPATIENT)
Dept: INTERNAL MEDICINE | Facility: CLINIC | Age: 84
End: 2024-06-10
Payer: MEDICARE

## 2024-08-04 DIAGNOSIS — N95.0 POSTMENOPAUSAL BLEEDING: Primary | ICD-10-CM

## 2024-08-05 ENCOUNTER — HOSPITAL ENCOUNTER (OUTPATIENT)
Dept: RADIOLOGY | Facility: HOSPITAL | Age: 84
Discharge: HOME | End: 2024-08-05
Attending: INTERNAL MEDICINE
Payer: MEDICARE

## 2024-08-05 DIAGNOSIS — N95.0 POSTMENOPAUSAL BLEEDING: ICD-10-CM

## 2024-08-05 PROCEDURE — 76856 US EXAM PELVIC COMPLETE: CPT | Mod: TC,HCNC

## 2024-08-05 PROCEDURE — 76856 US EXAM PELVIC COMPLETE: CPT | Mod: 26,HCNC,, | Performed by: STUDENT IN AN ORGANIZED HEALTH CARE EDUCATION/TRAINING PROGRAM

## 2024-08-10 ENCOUNTER — HOSPITAL ENCOUNTER (EMERGENCY)
Facility: HOSPITAL | Age: 84
Discharge: HOME | End: 2024-08-10
Attending: EMERGENCY MEDICINE
Payer: MEDICARE

## 2024-08-10 VITALS
HEIGHT: 62 IN | BODY MASS INDEX: 22.45 KG/M2 | DIASTOLIC BLOOD PRESSURE: 90 MMHG | WEIGHT: 122 LBS | SYSTOLIC BLOOD PRESSURE: 125 MMHG | RESPIRATION RATE: 18 BRPM | TEMPERATURE: 99 F | HEART RATE: 78 BPM | OXYGEN SATURATION: 100 %
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
## 2024-08-10 PROCEDURE — 25000003 PHARM REV CODE 250: Mod: HCNC

## 2024-08-10 PROCEDURE — 99285 EMERGENCY DEPT VISIT HI MDM: CPT | Mod: 25,HCNC

## 2024-08-10 RX ORDER — ACETAMINOPHEN 500 MG
1000 TABLET ORAL
Status: COMPLETED | OUTPATIENT
Start: 2024-08-10 | End: 2024-08-10

## 2024-08-10 RX ADMIN — ACETAMINOPHEN 1000 MG: 500 TABLET ORAL at 05:08

## 2024-08-10 NOTE — ED TRIAGE NOTES
Patient fell hit her head landing on her nose from a mechanical fall from leaning over. The patient did not LOC and is not on blood thinners the patient is at baseline mental status per patients friend.

## 2024-08-10 NOTE — ED PROVIDER NOTES
Encounter Date: 8/10/2024       History     Chief Complaint   Patient presents with    Fall     Pt arrived via EMS reporting mechanical fall from leaning over. -LOC, -anticoagulants, at baseline mental status per EMS. Obvious deformity to nose, bleeding controlled PTA.      Nettie Villasenor is an 83-year-old female with history of mild cognitive impairment, schizophrenia, cytopenia presenting to the ED from her care facility after ground level fall.  She reportedly fell from her wheelchair after bending to  an item that she dropped.  Denies LoC, syncope, acute weakness around the fall.  Here in the ED, patient endorses mild pain and has a small laceration her nose.  Reportedly she was wearing glasses and hit her face. Currently denies ongoing headache, N/V, neck pain, blurry vision, recent illnesses/infections, fever, new pain anywhere else her body.    The history is provided by the patient and the nursing home.     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;  Service: General;  Laterality: Left;    ORIF left lower leg      TONSILLECTOMY       Family History   Problem Relation Name Age of Onset    Dementia Mother      Heart attack Father      Melanoma Neg Hx      Breast cancer Neg Hx      Colon cancer Neg Hx      Ovarian cancer Neg Hx       Social History     Tobacco Use    Smoking status: Never    Smokeless tobacco: Never   Substance Use Topics    Alcohol use: No     Drug use: No     Review of Systems  10-point Review of Systems was performed and is negative/non-contributory unless otherwise stated in above HPI.    Physical Exam     Initial Vitals [08/10/24 1356]   BP Pulse Resp Temp SpO2   (!) 125/56 80 16 97.2 °F (36.2 °C) 99 %      MAP       --         Physical Exam    Constitutional: She appears well-developed and well-nourished. No distress.   HENT:   Head: Normocephalic. Head is with laceration.   Right Ear: External ear normal.   Left Ear: External ear normal.   Nose: Nose lacerations and sinus tenderness present. No epistaxis.  No foreign bodies.       Mouth/Throat: Oropharynx is clear and moist.   2 x all laceration over left bridge of nose, not amenable to sutures, hemostatic   Eyes: Conjunctivae and EOM are normal. Pupils are equal, round, and reactive to light.   Neck: Neck supple.   Normal range of motion.   Full passive range of motion without pain.     Cardiovascular:  Normal rate, regular rhythm, normal heart sounds and intact distal pulses.     Exam reveals no gallop and no friction rub.       No murmur heard.  Pulmonary/Chest: Breath sounds normal. No respiratory distress. She has no wheezes. She has no rhonchi. She has no rales.   Abdominal: Abdomen is soft. She exhibits no distension. There is no abdominal tenderness.   Musculoskeletal:         General: No edema. Normal range of motion.      Cervical back: Full passive range of motion without pain, normal range of motion and neck supple. No spinous process tenderness or muscular tenderness.     Neurological: She is alert and oriented to person, place, and time.   Skin: Skin is warm and dry.         ED Course   Procedures  Labs Reviewed   HIV 1 / 2 ANTIBODY   HEPATITIS C ANTIBODY          Imaging Results              CT Head Without Contrast (Final result)  Result time 08/10/24 16:39:11      Final result by Gato Quinn MD (08/10/24 16:39:11)                   Impression:      Motion artifact and streak  artifact from hearing aids.    No acute intracranial process/injury.      Electronically signed by: Gato Quinn  Date:    08/10/2024  Time:    16:39               Narrative:    EXAMINATION:  CT HEAD WITHOUT CONTRAST    CLINICAL HISTORY:  Facial trauma, blunt;    TECHNIQUE:  Low dose axial images were obtained through the head.  Coronal and sagittal reformations were also performed. Contrast was not administered.    COMPARISON:  11/18/2022    FINDINGS:  No evidence of hydrocephalus, mass effect, intracranial hemorrhage or acute territorial infarct.    Decreased attenuation in the periventricular white matter is nonspecific but may reflect mild chronic small vessel ischemic change.    The calvarium is intact. The visualized sinuses and mastoid air cells are clear.                                       CT Maxillofacial Without Contrast (Final result)  Result time 08/10/24 16:46:42      Final result by Gato Quinn MD (08/10/24 16:46:42)                   Impression:      No acute facial fracture.      Electronically signed by: Gato Quinn  Date:    08/10/2024  Time:    16:46               Narrative:    EXAMINATION:  CT MAXILLOFACIAL WITHOUT CONTRAST    CLINICAL HISTORY:  Facial trauma, blunt;    TECHNIQUE:  Low dose axial images, sagittal and coronal reformations were obtained through the face.  Contrast was not administered.    3D reformats were created to evaluate the osseous elements.    COMPARISON:  12/11/2010    FINDINGS:  No acute facial fracture.    The visualized paranasal sinuses and mastoid air cells are essentially clear.    No acute posttraumatic injury involving the globes or retro bulbar spaces.    Degenerative changes of the right TMJ.                                       CT Cervical Spine Without Contrast (Final result)  Result time 08/10/24 16:57:07      Final result by Gato Quinn MD (08/10/24 16:57:07)                   Impression:      No acute cervical  fracture.      Electronically signed by: Gato Quinn  Date:    08/10/2024  Time:    16:57               Narrative:    EXAMINATION:  CT CERVICAL SPINE WITHOUT CONTRAST    CLINICAL HISTORY:  ground level fall with face strike;    TECHNIQUE:  Low dose axial images, sagittal and coronal reformations were performed though the cervical spine.  Contrast was not administered.    COMPARISON:  11/18/2022    FINDINGS:  No acute cervical fracture.    The prevertebral soft tissues demonstrate no definite edema.    Disc bulging and endplate osteophyte formation abuts the traversing cord with no evidence to suggest overt cord impingement.    Enlarged thyroid left lobe of the thyroid gland incompletely visualized but again noted displacing the larynx and trachea to the right.                                       Medications   acetaminophen tablet 1,000 mg (has no administration in time range)     Medical Decision Making  Nettie Villasenor is an 83-year-old history of cognitive impairment, schizophrenia, osteopenia presenting to the ED after mechanical ground level fall history and physical exam as above, history limited by, supplemented by nursing home representative at bedside.  Upon presentation, vital signs stable and not actionable.  Pain reported to bridge of her nose, addressed with 1 g Tylenol p.o..    Given lack of other symptoms surrounding fall and description of the mechanism, likely purely mechanical in nature little suspicion for cardiac or neurological causes of syncope or acute weakness.  Patient mentating at baseline, without other acute complaints or injuries.  CT head, maxillofacial, and C-spine unremarkable for acute fracture.  Lacerations are superficial and not amenable to suturing.  At time of discharge, patient is stable, without acute complaints, and likely safe to return back to her nursing home.    Amount and/or Complexity of Data Reviewed  Radiology: ordered.    Risk  OTC drugs.                      Signed,    Lex Hirsch MD  Emergency Medicine, PGY-1  [facility]                   Clinical Impression:  Final diagnoses:  [W19.XXXA] Fall, initial encounter (Primary)          ED Disposition Condition    Discharge Stable          ED Prescriptions    None       Follow-up Information       Follow up With Specialties Details Why Contact Info    Carolina Chavez MD Internal Medicine  As needed [id number]  [place] [address]  [phone]               Lex Hirsch MD  Resident  08/10/24 1256

## 2024-08-11 ENCOUNTER — PATIENT MESSAGE (OUTPATIENT)
Dept: INTERNAL MEDICINE | Facility: CLINIC | Age: 84
End: 2024-08-11
Payer: MEDICARE

## 2024-08-11 DIAGNOSIS — N95.0 POST-MENOPAUSAL BLEEDING: Primary | ICD-10-CM
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
## 2024-08-14 DIAGNOSIS — M54.9 BACK PAIN, UNSPECIFIED BACK LOCATION, UNSPECIFIED BACK PAIN LATERALITY, UNSPECIFIED CHRONICITY: Primary | ICD-10-CM

## 2024-08-14 DIAGNOSIS — R26.9 GAIT ABNORMALITY: ICD-10-CM

## 2024-08-29 ENCOUNTER — DOCUMENTATION ONLY (OUTPATIENT)
Dept: INTERNAL MEDICINE | Facility: CLINIC | Age: 84
End: 2024-08-29
Payer: MEDICARE

## 2024-08-29 NOTE — PROGRESS NOTES
Pt seen at [facility] on 8/27/24 - Sitting in wheelchair.Alert and oriented.  Resp effort normal. Lungs clear, CV RRR.  1+ lower extremity edema.  Moving all limbs well.  When getting up trying to move, complained of neck and lower back pain. MOved very slow.  Afraid that she was going to fall    /70 Pulse 80    CT cervical spine 8/10/24 revealed bulging discs and arthritis    BMD 11/2022 - osteoporosis    Pt needs home health for Arthritis of cervical spine and osteoporosis which results in  gait abnormality, neck pain and back pain.  SHe is at risk for falls. She had a fall which resulted in ED visit on 8/10/24.

## 2024-08-30 DIAGNOSIS — M81.0 AGE-RELATED OSTEOPOROSIS WITHOUT CURRENT PATHOLOGICAL FRACTURE: ICD-10-CM

## 2024-08-30 DIAGNOSIS — M47.812 ARTHRITIS OF FACET JOINT OF CERVICAL SPINE: Primary | ICD-10-CM

## 2024-09-13 DIAGNOSIS — F39 MOOD DISORDER: ICD-10-CM

## 2024-09-13 DIAGNOSIS — F41.9 ANXIETY: ICD-10-CM

## 2024-09-13 RX ORDER — ARIPIPRAZOLE 2 MG/1
TABLET ORAL
Qty: 30 TABLET | Refills: 10 | Status: SHIPPED | OUTPATIENT
Start: 2024-09-13

## 2024-09-13 RX ORDER — FLUOXETINE HYDROCHLORIDE 20 MG/1
CAPSULE ORAL
Qty: 30 CAPSULE | Refills: 10 | Status: SHIPPED | OUTPATIENT
Start: 2024-09-13

## 2024-09-24 ENCOUNTER — OFFICE VISIT (OUTPATIENT)
Dept: OBSTETRICS AND GYNECOLOGY | Facility: CLINIC | Age: 84
End: 2024-09-24
Payer: MEDICARE

## 2024-09-24 VITALS
DIASTOLIC BLOOD PRESSURE: 58 MMHG | HEIGHT: 62 IN | WEIGHT: 279.75 LBS | SYSTOLIC BLOOD PRESSURE: 88 MMHG | BODY MASS INDEX: 51.48 KG/M2

## 2024-09-24 DIAGNOSIS — R60.0 LOWER EXTREMITY EDEMA: ICD-10-CM

## 2024-09-24 DIAGNOSIS — N95.0 POST-MENOPAUSAL BLEEDING: Primary | ICD-10-CM

## 2024-09-24 PROCEDURE — 99999 PR PBB SHADOW E&M-EST. PATIENT-LVL III: CPT | Mod: PBBFAC,HCNC,, | Performed by: STUDENT IN AN ORGANIZED HEALTH CARE EDUCATION/TRAINING PROGRAM

## 2024-09-24 NOTE — PROGRESS NOTES
Gynecology    SUBJECTIVE:     Chief Complaint: Vaginal Bleeding       History of Present Illness:  Nettie Villasenor is a 84 yo G0 female here today for evaluation of postmenopausal bleeding. The patient presents today with two caregivers. They state that the patient had vaginal spotting that they noticed when wiping/cleaning the patient a few weeks ago. State the spotting lasted for 3-4 days. No further episodes of bleeding.   Patient has history of stage 1A ER+/GA+HER- breast cancer and is taking anastrozole. She is wheelchair bound.  Transabdominal ultrasound obtained on 08/05 demonstrated EMS 2 mm.     Review of Systems:  Review of Systems   Constitutional:  Negative for chills and fever.   Respiratory:  Negative for shortness of breath.    Cardiovascular:  Negative for chest pain.   Gastrointestinal:  Negative for abdominal pain, nausea and vomiting.   Endocrine: Negative for hot flashes.   Genitourinary:  Positive for postmenopausal bleeding.   Integumentary:  Negative for breast mass, nipple discharge and breast skin changes.   Neurological:  Negative for headaches.   Hematological:  Does not bruise/bleed easily.   Psychiatric/Behavioral:  Negative for depression.    Breast: Negative for mass, mastodynia, nipple discharge and skin changes       OBJECTIVE:     Physical Exam:  Physical Exam  Constitutional:       Appearance: Normal appearance.   HENT:      Head: Normocephalic and atraumatic.   Eyes:      Conjunctiva/sclera: Conjunctivae normal.      Pupils: Pupils are equal, round, and reactive to light.   Cardiovascular:      Rate and Rhythm: Normal rate and regular rhythm.   Pulmonary:      Effort: Pulmonary effort is normal. No respiratory distress.   Abdominal:      General: Abdomen is flat. There is no distension.      Palpations: Abdomen is soft.      Tenderness: There is no abdominal tenderness.   Musculoskeletal:         General: Normal range of motion.      Cervical back: Normal range of motion.    Neurological:      Mental Status: She is alert.   Psychiatric:         Mood and Affect: Mood normal.         Thought Content: Thought content normal.         ASSESSMENT:       ICD-10-CM ICD-9-CM    1. Post-menopausal bleeding  N95.0 627.1 Ambulatory referral/consult to Gynecology      2. Lower extremity edema  R60.0 782.3 COMPRESSION STOCKINGS      compress.stocking,knee,reg,med Misc             Plan:      Nettie was seen today for vaginal bleeding.    Diagnoses and all orders for this visit:    Post-menopausal bleeding  -     Ambulatory referral/consult to Gynecology  -     One episode of spotting with no further occurrences  -     Pelvic ultrasound reviewed with thin endometrial stripe 2 mm  -     Patient is wheelchair bound and unable to tolerate pelvic exams  -     Discussed with patient's caregivers that as one time episode with normal EMS, will continue to monitor. However, if bleeding occurs again will need to discuss endometrial sampling    Lower extremity edema  -     COMPRESSION STOCKINGS  -     compress.stocking,knee,reg,med Misc; 2 each by Misc.(Non-Drug; Combo Route) route as needed (swelling).        Orders Placed This Encounter   Procedures    COMPRESSION STOCKINGS       No follow-ups on file.    Devin Billingsley

## 2024-09-25 ENCOUNTER — EXTERNAL HOME HEALTH (OUTPATIENT)
Dept: HOME HEALTH SERVICES | Facility: HOSPITAL | Age: 84
End: 2024-09-25
Payer: MEDICARE

## 2024-10-01 ENCOUNTER — INFUSION (OUTPATIENT)
Dept: INFECTIOUS DISEASES | Facility: HOSPITAL | Age: 84
End: 2024-10-01
Payer: MEDICARE

## 2024-10-01 VITALS
HEART RATE: 71 BPM | RESPIRATION RATE: 20 BRPM | SYSTOLIC BLOOD PRESSURE: 125 MMHG | BODY MASS INDEX: 22.44 KG/M2 | DIASTOLIC BLOOD PRESSURE: 58 MMHG | TEMPERATURE: 98 F | OXYGEN SATURATION: 98 % | HEIGHT: 62 IN | WEIGHT: 121.94 LBS

## 2024-10-01 DIAGNOSIS — M81.0 AGE-RELATED OSTEOPOROSIS WITHOUT CURRENT PATHOLOGICAL FRACTURE: Primary | ICD-10-CM

## 2024-10-01 PROCEDURE — 63600175 PHARM REV CODE 636 W HCPCS: Mod: JZ,JG,HCNC | Performed by: INTERNAL MEDICINE

## 2024-10-01 PROCEDURE — 96372 THER/PROPH/DIAG INJ SC/IM: CPT | Mod: HCNC

## 2024-10-01 RX ADMIN — DENOSUMAB 60 MG: 60 INJECTION SUBCUTANEOUS at 10:10

## 2024-10-01 NOTE — PROGRESS NOTES
Patient arrives for Prolia injection - confirms use of calcium and vitamin D supplements and denies dental procedures over past 3 months - administered per guidelines    Next appt given to caregiver    Limited head-to-toe assessment due to privacy issues and visit reason though the opportunity was given for patient to express any concerns

## 2024-10-15 DIAGNOSIS — C50.912 INFILTRATING DUCTAL CARCINOMA OF LEFT BREAST: ICD-10-CM

## 2024-10-15 RX ORDER — ANASTROZOLE 1 MG/1
TABLET ORAL
Qty: 30 TABLET | Refills: 10 | Status: SHIPPED | OUTPATIENT
Start: 2024-10-15

## 2024-10-17 ENCOUNTER — IMMUNIZATION (OUTPATIENT)
Dept: INTERNAL MEDICINE | Facility: CLINIC | Age: 84
End: 2024-10-17
Payer: MEDICARE

## 2024-10-17 DIAGNOSIS — Z23 NEED FOR VACCINATION: Primary | ICD-10-CM

## 2024-10-17 PROCEDURE — 90653 IIV ADJUVANT VACCINE IM: CPT | Mod: HCNC,S$GLB,, | Performed by: INTERNAL MEDICINE

## 2024-10-17 PROCEDURE — G0008 ADMIN INFLUENZA VIRUS VAC: HCPCS | Mod: HCNC,S$GLB,, | Performed by: INTERNAL MEDICINE

## 2024-10-30 ENCOUNTER — DOCUMENT SCAN (OUTPATIENT)
Dept: HOME HEALTH SERVICES | Facility: HOSPITAL | Age: 84
End: 2024-10-30
Payer: MEDICARE

## 2024-11-04 ENCOUNTER — DOCUMENTATION ONLY (OUTPATIENT)
Dept: PSYCHIATRY | Facility: CLINIC | Age: 84
End: 2024-11-04
Payer: MEDICARE

## 2024-11-04 NOTE — PROGRESS NOTES
"[facility]  Virtual 11/4/24    Plan at last appointment on 2/22/2022:  No medication changes today.  Continue Prozac 20mg daily and Abilify 2mg qHS.  Follow up in 1 year.    Met with patient and Dr. Jha virtually at [facility].   is now Re.  Says she is "good."  Had a good Halloween at [facility].  Looking forward to going home for Thanksgiving.  Nephew - Tyler, wife is León - goes to their house in [place].  They have 4 children, 2 girls, 2 boys.  Says her mood has been "fine."  Denies issues.  Just had her birthday.  Mostly in wheelchair now.  Gets along well with housemates.  They went to see [employer] for her birthday.  Says she is sleeping very well.  Mood is happy and positive most days per Dr. Jha.  She has slowed down physically, normal aging though.  Planning to get electronic wheelchair.  On interview, patient is calm and cooperative.  Affect is euthymic and her thoughts are linear.      Patient doing well overall.  No medication changes today.  Continue Prozac 20mg daily and Abilify 2mg qHS.  Follow up in 1 year.  "

## 2025-01-13 DIAGNOSIS — Z00.00 ENCOUNTER FOR MEDICARE ANNUAL WELLNESS EXAM: ICD-10-CM

## 2025-01-16 DIAGNOSIS — Z78.0 MENOPAUSE: ICD-10-CM

## 2025-01-23 DIAGNOSIS — R29.898 WEAKNESS OF LOWER EXTREMITY, UNSPECIFIED LATERALITY: Primary | ICD-10-CM

## 2025-01-30 ENCOUNTER — CLINICAL SUPPORT (OUTPATIENT)
Dept: REHABILITATION | Facility: HOSPITAL | Age: 85
End: 2025-01-30
Attending: INTERNAL MEDICINE
Payer: MEDICARE

## 2025-01-30 DIAGNOSIS — R60.0 EDEMA OF BOTH LOWER LEGS: ICD-10-CM

## 2025-01-30 DIAGNOSIS — R29.898 WEAKNESS OF LOWER EXTREMITY, UNSPECIFIED LATERALITY: ICD-10-CM

## 2025-01-30 DIAGNOSIS — R26.89 IMPAIRED GAIT AND MOBILITY: Primary | ICD-10-CM

## 2025-01-30 PROCEDURE — 97162 PT EVAL MOD COMPLEX 30 MIN: CPT | Mod: HCNC,PO

## 2025-01-30 PROCEDURE — 97110 THERAPEUTIC EXERCISES: CPT | Mod: HCNC,PO

## 2025-01-30 NOTE — PATIENT INSTRUCTIONS
Try to perform  non- therapy days.     FLEXION: Sitting (Active)    Sit, both feet flat. March legs, lifting one  knee toward ceiling, then the other.     Complete 3 sets of 10 repetitions.       ANKLE PUMPS - SEATED   While seated with feet on the floor, press your toes into the floor so that your heels raise up off the floor. Then, relax to allow your heels to lower and then lift your toes off the floor as your heels press into the floor. Alternate and repeat.      Benefit: Ankle pumping exercises utilize a calf muscle pump function to pump blood to the heart by muscle contraction. Ankle pumping exercises are often used for the relief of edema and the prevention of deep vein thrombosis (DVT), which are associated with prolonged bed rest.     Copyright © [employer]. All rights reserved.

## 2025-01-30 NOTE — PROGRESS NOTES
Outpatient Rehab    Physical Therapy Evaluation    Patient Name: Nettie Villasenor  MRN: [id number]  YOB: 1940  Today's Date: 1/30/2025    Therapy Diagnosis:   Encounter Diagnoses   Name Primary?    Weakness of lower extremity, unspecified laterality     Impaired gait and mobility Yes    Edema of both lower legs      Physician: Carolina Chavez MD    Physician Orders: Eval and Treat  Medical Diagnosis: R29.898 (ICD-10-CM) - Weakness of lower extremity, unspecified laterality    Visit # / Visits Authorized:  1 / 1   Date of Evaluation:  1/30/2025   Insurance Authorization Period: 1/23/25 to 1/23/26  Plan of Care Certification:  1/30/2025 to 3/27/25      Time In: 1345   Time Out: 1425  Total Time: 40   Total Billable Time: 40    Precautions     Fall risk, cognitive deficits      Subjective   History of Present Illness  Nettie is a 84 y.o. female who reports to physical therapy with a chief concern of No longer able to stand and walk; wants to get stronger.. According to the patient's chart, Nettie has a past medical history of Breast cancer, Cataracts, bilateral, Mental disability, Mild mental retardation, Osteopenia, Schizophrenia, and Thyroid nodule. Nettie has a past surgical history that includes Tonsillectomy; ORIF left lower leg; LUMPECTOMY,BREAST,WITH RADIOACTIVE SEED LOCALIZATION (Left, 09/04/2020); Breast lumpectomy; and Breast biopsy (Left).                History of Present Condition/Illness: Pt lives in adult care facility, [facility]. She has been wheelchair bound for over a year now and is here to work on getting legs stronger. She also have notable bilateral lower extremity edema. She used to use Rolling Walker (RW) but it has been over a year.     Activities of Daily Living  Social history was obtained from Patient and Other (Comment). [facility]   General Prior Level of Function Comments: Walking with RW with assist from home staff.  General Current Level of Function Comments:   bound for all mobility.       Previously independent with activities of daily living? No     Currently independent with activities of daily living? No              Pain  No Pain Reported: Yes                 Treatment History  Treatments  Previously Received Treatments: Yes  Previous Treatments: Physical therapy    Living Arrangements  Living Situation  Housing: Extended care/non-skilled facility  Living Arrangements: Other (Comment)  Other Living Arrangements Comment: Staff  Support Systems: Home care staff    Equipment/Treatments  Mobility Equipment: Wheeled walker, Manual wheelchair          Past Medical History/Physical Systems Review:   Nettie Villasenor  has a past medical history of Breast cancer, Cataracts, bilateral, Mental disability, Mild mental retardation, Osteopenia, Schizophrenia, and Thyroid nodule.    Nettie Villasenor  has a past surgical history that includes Tonsillectomy; ORIF left lower leg; LUMPECTOMY,BREAST,WITH RADIOACTIVE SEED LOCALIZATION (Left, 09/04/2020); Breast lumpectomy; and Breast biopsy (Left).    Nettie has a current medication list which includes the following prescription(s): acetaminophen, anastrozole, aripiprazole, aspirin, aspirin, calcium carbonate, compress.stocking,knee,reg,med, docusate sodium, fexofenadine, fluoxetine, lorazepam, multivitamin, and vitamin d.    Review of patient's allergies indicates:  No Known Allergies     Patient's mobility presenting to therapy evaluation: manual wheelchair  Objective   Posture                 Slouched in wheelchair, sacral sitting, hip adduction tendency        Mental status: alert, oriented to person, place, and time  Appearance: Casually dressed  Behavior:  adequate rapport can be established  Attention Span and Concentration:  Easily distracted  Follows commands: 100% of time   Speech: no deficits          Edema observed: Yes   severe bilateral lower legs pitting edema        Tone: decreased  Limbs/muscles affected: bilateral lower  extremities, trunk, bilateral upper extremities       ROM:   UPPER EXTREMITY--AROM/PROM  (R) UE: limited as follows: limited hand and wrist function  (L) UE: limited as follows: limited hand and wrist function         RANGE OF MOTION--LOWER EXTREMITIES - PROM all WFL, AROM impaired due to strength       Lower Extremity Strength  Right LE  Left LE    Hip Flexion: 3/5 Hip Flexion: 3/5   Hip Extension:  3/5 Hip Extension: 3/5   Hip Abduction: 3+/5 Hip Abduction: 3+/5   Hip Adduction: 3/5 Hip Adduction 3/5   Knee Extension: 3/5 Knee Extension: 3/5   Knee Flexion: 3/5 Knee Flexion: 3/5   Ankle Dorsiflexion: trace Ankle Dorsiflexion: trace   Ankle Plantarflexion: trace Ankle Plantarflexion: trace     Abdominal Strength: 3/5    Flexibility: impaired - moderate     Evaluation 01/30/2025   30 second Chair Rise  (normative values to maintain physical independence: 11) 1 completed with arms with Juan byPT         Balance Testing   Sitting balance static: fair  Sitting balance dynamic: fair  Standing balance static: poor  Standing balance dynamic:  poor       GAIT ASSESSMENT - non ambulatory         Endurance Deficit: severe  Standing tolerance with Juan: 39s   Standing posture: left hip into adduction, knee into valgus, hips unlevel with left hip elevated      Functional Mobility (Bed mobility, transfers)  Bed mobility:  Min A  Supine to sit:  Min A  Sit to supine:  Min A  Sit to stand:   Min A  Stand pivot:    Min A  Transfers to bed:  Mod A  Transfers to toilet: Min A/modA  Transfers to shower / tub:   Mod A  Wheelchair mobility:  D in manual WC       Intake Outcome Measure for FOTO Survey  FOTO not completed due to cognitive status     Treatment:   Nettie received therapeutic exercises to develop strength, ROM and edema management for 8 minutes including:    Established, taught and practiced home exercise program:    -Seated hip flexion marches  -ankle pumps/heel rises bilateral     Written Home Exercises Provided:  yes.  Exercises were reviewed and Nettie was able to demonstrate them prior to the end of the session.  Nettie demonstrated good  understanding of the education provided.     See EMR under Patient Instructions for exercises provided 1/30/2025.      Patient's spiritual, cultural, and educational needs considered and patient agreeable to plan of care and goals.     Assessment & Plan   Assessment  Nettie presents with a condition of Moderate complexity.   Presentation of Symptoms: Evolving  Will Comorbidities Impact Care: Yes  Cognitive disability and personality disorders will likely affect participation and carryover.     Functional Limitations: Activity tolerance, Bed mobility, Completing self-care activities, Decreased ambulation distance/endurance, Functional mobility, Increased risk of fall, Maintaining balance, Manipulating objects, Transfers, Standing tolerance  Impairments: Impaired balance, Impaired cognition, Impaired physical strength, Safety issue  Personal Factors Affecting Prognosis: Cognitive impairment, Schedule, Transportation    Patient Goal for Therapy (PT): Improve strength and stand/walk again.  Assessment Details: Mrs. Villasenor presents today with medical diagnosis of weakness and bilateral lower extremity edema. She lives in an adult care facility, [facility]. She has been wheelchair bound for over a year now and is here to work on getting legs stronger. She also has notable bilateral lower extremity edema. She used to use Rolling Walker (RW) but it has been over a year. She presents with very weak strength scoring less than 3/5 for all lower extremity manual muscle tests. She was able to stand with Juan but with poor posture as described above and limited to only 39 seconds max trial time. Initial home exercise program established today focusing on AROM for strength and to promote edema management with ankle pumps. She may be limited by motivation and cognitive status. Skilled PT is warranted to  address strength, balance and functional mobility safety. Realistic goal setting discussed as functional gait may not be achievable but increased participation and safety with transfers and static standing ADLs has improved potential     Plan  From a physical therapy perspective, the patient would benefit from: Skilled Rehab Services    Planned therapy interventions include: Therapeutic exercise, Therapeutic activities, Neuromuscular re-education, and Gait training.            Visit Frequency: 2 times Per Week for 8 Weeks.       This plan was discussed with Patient and Other (Comment). Home Staff member -schedule given to her Discussion participants: Agreed Upon Plan of Care             Goals:   Active       Long Term Goal        Pt will improve bilaterally LE ankle all motions MMT scores to 2/5 for improved stability with stance and functional mobility.     (Progressing)       Start:  01/30/25    Expected End:  03/27/25            Pt will improve 30s chair rise score to at least 1 reps with SBA ONLY  and UE assist for improved muscular endurance.     (Progressing)       Start:  01/30/25    Expected End:  03/27/25            Pt will improve standing tolerance with CGA or less to 5 minutes for increased participation with standing ADLs.     (Progressing)       Start:  01/30/25    Expected End:  03/27/25            Pt will complete stand pivot/stand step transfers at SBA.  (Progressing)       Start:  01/30/25    Expected End:  03/27/25               short term goals       Pt will improve bilaterally LE hip all motions MMT scores to 4/5 for improved stability with stance and functional mobility.     (Progressing)       Start:  01/30/25    Expected End:  02/27/25            Pt will improve bilaterally LE knee all motions MMT scores to 4/5 for improved stability with stance and functional mobility.     (Progressing)       Start:  01/30/25    Expected End:  02/27/25            Pt will improve bilaterally LE ankle all  motions MMT scores to 2/5 for improved stability with stance and functional mobility.     (Progressing)       Start:  01/30/25    Expected End:  02/27/25            Pt will improve 30s chair rise score to at least 3 reps with ROSHAN and UE assist for improved muscular endurance.     (Progressing)       Start:  01/30/25    Expected End:  02/27/25            Pt will improve standing tolerance with ROSHAN or less to 2 minutes for increased participation with standing ADLs.     (Progressing)       Start:  01/30/25    Expected End:  02/27/25

## 2025-02-11 DIAGNOSIS — R60.9 EDEMA, UNSPECIFIED TYPE: Primary | ICD-10-CM

## 2025-02-18 ENCOUNTER — CLINICAL SUPPORT (OUTPATIENT)
Dept: REHABILITATION | Facility: HOSPITAL | Age: 85
End: 2025-02-18
Payer: MEDICARE

## 2025-02-18 DIAGNOSIS — R60.0 EDEMA OF BOTH LOWER LEGS: ICD-10-CM

## 2025-02-18 DIAGNOSIS — R26.89 IMPAIRED GAIT AND MOBILITY: Primary | ICD-10-CM

## 2025-02-18 DIAGNOSIS — R29.898 WEAKNESS OF LOWER EXTREMITY, UNSPECIFIED LATERALITY: ICD-10-CM

## 2025-02-18 PROCEDURE — 97110 THERAPEUTIC EXERCISES: CPT | Mod: HCNC,PO,CQ

## 2025-02-18 NOTE — PROGRESS NOTES
"  Outpatient Rehab    Physical Therapy Visit    Patient Name: Nettie Villasenor  MRN: [id number]  YOB: 1940  Today's Date: 2/18/2025    Therapy Diagnosis:   Encounter Diagnoses   Name Primary?    Impaired gait and mobility Yes    Weakness of lower extremity, unspecified laterality     Edema of both lower legs      Physician: Carolina Chavez MD    Physician Orders: Eval and Treat  Medical Diagnosis: R29.898 (ICD-10-CM) - Weakness of lower extremity, unspecified laterality     Visit # / Visits Authorized:  1 / 15 ( plus eval)  Date of Evaluation:  1/30/2025   Insurance Authorization Period: 1/23/25 to 1/23/26  Plan of Care Certification:  1/30/2025 to 3/27/25      Time In: 1045   Time Out: 1115  Total Time: 30   Total Billable Time: 30           Subjective   " She is doing pretty good.".  Family / care giver present for this visit:  (remained in the lobby)    Arthritis pain all over    Objective            Treatment:  Therapeutic Exercise  Therapeutic Exercise Activity 1: x 8 min on Sci Fit recumbent stepper.  B UE/B LE on level 1.0.  Pt remained sitting in her personal W/C  Therapeutic Exercise Activity 2: Sitting in W/C ----->   2 x 10 reps of B LE heel raises, 2 x 10 reps of toe raises, 2 x 10 reps of hip abd/add, 2 x 10 reps of hip flexion, 2 x 10 reps of LAQ  Therapeutic Exercise Activity 3: 2 trials of sit to stand from W/C with 2 UE support and CGA,  Static standing x 30 sec x 2 trials with 2 HHA    Assessment & Plan   Assessment: Nettie tolerated her tx session well and did not have any problems noted.  Nettie was slightly limited 2* time constraint.  Nettie began cardiovascular endurance on the stepper sitting in her own W/C and began strengthening exercise in her W/C. Cont with plan of care.  Evaluation/Treatment Tolerance: Patient tolerated treatment well    Patient will continue to benefit from skilled outpatient physical therapy to address the deficits listed in the problem list box on initial " evaluation, provide pt/family education and to maximize pt's level of independence in the home and community environment.     Patient's spiritual, cultural, and educational needs considered and patient agreeable to plan of care and goals.           Plan: Cont with strengthening, endurance, and functional mobility.    Goals:   Active       Long Term Goal        Pt will improve bilaterally LE ankle all motions MMT scores to 2/5 for improved stability with stance and functional mobility.     (Progressing)       Start:  01/30/25    Expected End:  03/27/25            Pt will improve 30s chair rise score to at least 1 reps with SBA ONLY  and UE assist for improved muscular endurance.     (Progressing)       Start:  01/30/25    Expected End:  03/27/25            Pt will improve standing tolerance with CGA or less to 5 minutes for increased participation with standing ADLs.     (Progressing)       Start:  01/30/25    Expected End:  03/27/25            Pt will complete stand pivot/stand step transfers at SBA.  (Progressing)       Start:  01/30/25    Expected End:  03/27/25               short term goals       Pt will improve bilaterally LE hip all motions MMT scores to 4/5 for improved stability with stance and functional mobility.     (Progressing)       Start:  01/30/25    Expected End:  02/27/25            Pt will improve bilaterally LE knee all motions MMT scores to 4/5 for improved stability with stance and functional mobility.     (Progressing)       Start:  01/30/25    Expected End:  02/27/25            Pt will improve bilaterally LE ankle all motions MMT scores to 2/5 for improved stability with stance and functional mobility.     (Progressing)       Start:  01/30/25    Expected End:  02/27/25            Pt will improve 30s chair rise score to at least 3 reps with ROSHAN and UE assist for improved muscular endurance.     (Progressing)       Start:  01/30/25    Expected End:  02/27/25            Pt will improve standing  tolerance with ROSHAN or less to 2 minutes for increased participation with standing ADLs.     (Progressing)       Start:  01/30/25    Expected End:  02/27/25                Chantal Ko, PTA

## 2025-02-24 ENCOUNTER — CLINICAL SUPPORT (OUTPATIENT)
Dept: REHABILITATION | Facility: HOSPITAL | Age: 85
End: 2025-02-24
Payer: MEDICARE

## 2025-02-24 DIAGNOSIS — R60.0 EDEMA OF BOTH LOWER LEGS: ICD-10-CM

## 2025-02-24 DIAGNOSIS — R26.89 IMPAIRED GAIT AND MOBILITY: Primary | ICD-10-CM

## 2025-02-24 DIAGNOSIS — R29.898 WEAKNESS OF LOWER EXTREMITY, UNSPECIFIED LATERALITY: ICD-10-CM

## 2025-02-24 PROCEDURE — 97530 THERAPEUTIC ACTIVITIES: CPT | Mod: HCNC,PO

## 2025-02-24 PROCEDURE — 97110 THERAPEUTIC EXERCISES: CPT | Mod: HCNC,PO

## 2025-02-24 NOTE — PROGRESS NOTES
Outpatient Rehab    Physical Therapy Visit    Patient Name: Nettie Villasenor  MRN: [id number]  YOB: 1940  Today's Date: 2/24/2025    Therapy Diagnosis:   Encounter Diagnoses   Name Primary?    Impaired gait and mobility Yes    Weakness of lower extremity, unspecified laterality     Edema of both lower legs      Physician: Carolina Chavez MD    Physician Orders: Eval and Treat  Medical Diagnosis: R29.898 (ICD-10-CM) - Weakness of lower extremity, unspecified laterality      Visit # / Visits Authorized:  2 / 15 ( plus eval)  Date of Evaluation:  1/30/2025   Insurance Authorization Period: 1/23/25 to 1/23/26  Plan of Care Certification:  1/30/2025 to 3/27/25   Progress Note Due: 2/28/2025     Time In: 1345   Time Out: 1427  Total Time: 42   Total Billable Time: 42 minutes    FOTO:  Intake Score:  %  Survey Score 1:  %  Survey Score 2:  %         Subjective             Objective            Treatment:  Therapeutic Exercise  Therapeutic Exercise Activity 1: x 8 min on Sci Fit recumbent stepper.  B UE/B LE on level 1.0.  Pt remained sitting in her personal W/C  Therapeutic Exercise Activity 2: Supine therex: 2x10 reps glute bridging, 2x10 reps hip abduction (butterfly AROM), 2x10 reps SLR each side  Therapeutic Exercise Activity 3: Seated EOM: 2x10 reps LAQ each side with 1# ankle weights    Therapeutic Activity  Therapeutic Activity 1: MWC <> gym mat via stand pivot with min A  Therapeutic Activity 2: Sit EOM <> supine on mat with SPV  Therapeutic Activity 3: x2 min of balloon volleyball seated EOM + PT CGA  Therapeutic Activity 4: x5 reps STS and stance hold for max time with HHAx2 (between 5-15 sec)    Assessment & Plan   Assessment: Nettie demonstrated fair tolerance to activity, limited mostly by decreased endurance but overall providing best effort. She was able to raise to stand several times with very light assist but unable to hold stand for a prolonged period. However, if her standing endurance  improves, she may be able to progress to gait training with the appropriate AD. She will continue to benefit from skilled PT to address mobility deficits and maximize safety and independence.  Evaluation/Treatment Tolerance: Patient limited by fatigue    Patient will continue to benefit from skilled outpatient physical therapy to address the deficits listed in the problem list box on initial evaluation, provide pt/family education and to maximize pt's level of independence in the home and community environment.     Patient's spiritual, cultural, and educational needs considered and patient agreeable to plan of care and goals.           Plan: Cont with strengthening, endurance, and functional mobility.    Goals:   Active       Long Term Goal        Pt will improve bilaterally LE ankle all motions MMT scores to 2/5 for improved stability with stance and functional mobility.     (Progressing)       Start:  01/30/25    Expected End:  03/27/25            Pt will improve 30s chair rise score to at least 1 reps with SBA ONLY  and UE assist for improved muscular endurance.     (Progressing)       Start:  01/30/25    Expected End:  03/27/25            Pt will improve standing tolerance with CGA or less to 5 minutes for increased participation with standing ADLs.     (Progressing)       Start:  01/30/25    Expected End:  03/27/25            Pt will complete stand pivot/stand step transfers at SBA.  (Progressing)       Start:  01/30/25    Expected End:  03/27/25               short term goals       Pt will improve bilaterally LE hip all motions MMT scores to 4/5 for improved stability with stance and functional mobility.     (Progressing)       Start:  01/30/25    Expected End:  02/27/25            Pt will improve bilaterally LE knee all motions MMT scores to 4/5 for improved stability with stance and functional mobility.     (Progressing)       Start:  01/30/25    Expected End:  02/27/25            Pt will improve bilaterally  LE ankle all motions MMT scores to 2/5 for improved stability with stance and functional mobility.     (Progressing)       Start:  01/30/25    Expected End:  02/27/25            Pt will improve 30s chair rise score to at least 3 reps with ROSHAN and UE assist for improved muscular endurance.     (Progressing)       Start:  01/30/25    Expected End:  02/27/25            Pt will improve standing tolerance with ROSHAN or less to 2 minutes for increased participation with standing ADLs.     (Progressing)       Start:  01/30/25    Expected End:  02/27/25                Mila Peñaloza, PT

## 2025-02-25 ENCOUNTER — HOSPITAL ENCOUNTER (OUTPATIENT)
Dept: RADIOLOGY | Facility: HOSPITAL | Age: 85
Discharge: HOME | End: 2025-02-25
Attending: INTERNAL MEDICINE
Payer: MEDICARE

## 2025-02-25 DIAGNOSIS — R60.9 EDEMA, UNSPECIFIED TYPE: ICD-10-CM

## 2025-02-25 PROCEDURE — 93970 EXTREMITY STUDY: CPT | Mod: 26,HCNC,, | Performed by: RADIOLOGY

## 2025-02-25 PROCEDURE — 93970 EXTREMITY STUDY: CPT | Mod: TC,HCNC

## 2025-02-26 ENCOUNTER — CLINICAL SUPPORT (OUTPATIENT)
Dept: REHABILITATION | Facility: HOSPITAL | Age: 85
End: 2025-02-26
Payer: MEDICARE

## 2025-02-26 DIAGNOSIS — R29.898 WEAKNESS OF LOWER EXTREMITY, UNSPECIFIED LATERALITY: ICD-10-CM

## 2025-02-26 DIAGNOSIS — R26.89 IMPAIRED GAIT AND MOBILITY: Primary | ICD-10-CM

## 2025-02-26 DIAGNOSIS — R60.0 EDEMA OF BOTH LOWER LEGS: ICD-10-CM

## 2025-02-26 PROCEDURE — 97530 THERAPEUTIC ACTIVITIES: CPT | Mod: HCNC,PO,CQ

## 2025-02-26 PROCEDURE — 97110 THERAPEUTIC EXERCISES: CPT | Mod: HCNC,PO,CQ

## 2025-02-26 NOTE — PROGRESS NOTES
"  Outpatient Rehab    Physical Therapy Visit    Patient Name: Nettie Villasenor  MRN: [id number]  YOB: 1940  Encounter Date: 2/26/2025    Therapy Diagnosis:   Encounter Diagnoses   Name Primary?    Impaired gait and mobility Yes    Weakness of lower extremity, unspecified laterality     Edema of both lower legs      Physician: Carolina Chavez MD    Physician Orders: Eval and Treat  Medical Diagnosis: R29.898 (ICD-10-CM) - Weakness of lower extremity, unspecified laterality     Visit # / Visits Authorized:  3 / 15 ( plus eval)  Date of Evaluation:  1/30/2025   Insurance Authorization Period: 1/23/25 to 1/23/26  Plan of Care Certification:  1/30/2025 to 3/27/25   Progress Note Due: 2/28/2025     Time In: 0930   Time Out: 1015  Total Time: 45   Total Billable Time: 45           Subjective   " I want to walk today.".  Family / care giver present for this visit:  (Caregiver sitting in lobby)  Pain reported as 0/10.      Objective            Treatment:  Therapeutic Exercise  Therapeutic Exercise Activity 1: x 8 min on SCi Fit recumbent stepper.  B UE/B LE.  Timer kept externally         Therapeutic Activity  Therapeutic Activity 1: SPT from W/C<> stepper seat with Min A and no A.D.  VCs for safety  Therapeutic Activity 2: Pt ambulated ~ 12 ft and 5 ft with Min A and RW  Therapeutic Activity 3: Sit to stand from W/C multiple trials with 2 UE and MIn A/CGA.  Min A/CGA  to elevate hips and for safety  Therapeutic Activity 4: SPT from W/C <>toilet with use of Handrail and Min A.  Min A to elevate B hips, pivot around safely    Assessment & Plan   Assessment: Nettie tolerated her tx session well and did not have any problems noted.  Nettie was able to take steps today and required less assistance for transfers.   Cont with plan of care.  Evaluation/Treatment Tolerance: Patient tolerated treatment well    Patient will continue to benefit from skilled outpatient physical therapy to address the deficits listed in the " problem list box on initial evaluation, provide pt/family education and to maximize pt's level of independence in the home and community environment.     Patient's spiritual, cultural, and educational needs considered and patient agreeable to plan of care and goals.           Plan: Cont with strengthening, transfers, functional mobility and gait    Goals:   Active       Long Term Goal        Pt will improve bilaterally LE ankle all motions MMT scores to 2/5 for improved stability with stance and functional mobility.     (Progressing)       Start:  01/30/25    Expected End:  03/27/25            Pt will improve 30s chair rise score to at least 1 reps with SBA ONLY  and UE assist for improved muscular endurance.     (Progressing)       Start:  01/30/25    Expected End:  03/27/25            Pt will improve standing tolerance with CGA or less to 5 minutes for increased participation with standing ADLs.     (Progressing)       Start:  01/30/25    Expected End:  03/27/25            Pt will complete stand pivot/stand step transfers at SBA.  (Progressing)       Start:  01/30/25    Expected End:  03/27/25               short term goals       Pt will improve bilaterally LE hip all motions MMT scores to 4/5 for improved stability with stance and functional mobility.     (Progressing)       Start:  01/30/25    Expected End:  02/27/25            Pt will improve bilaterally LE knee all motions MMT scores to 4/5 for improved stability with stance and functional mobility.     (Progressing)       Start:  01/30/25    Expected End:  02/27/25            Pt will improve bilaterally LE ankle all motions MMT scores to 2/5 for improved stability with stance and functional mobility.     (Progressing)       Start:  01/30/25    Expected End:  02/27/25            Pt will improve 30s chair rise score to at least 3 reps with ROSHAN and UE assist for improved muscular endurance.     (Progressing)       Start:  01/30/25    Expected End:  02/27/25             Pt will improve standing tolerance with ROSHAN or less to 2 minutes for increased participation with standing ADLs.     (Progressing)       Start:  01/30/25    Expected End:  02/27/25                Chantal Ko, PTA

## 2025-03-05 ENCOUNTER — CLINICAL SUPPORT (OUTPATIENT)
Dept: REHABILITATION | Facility: HOSPITAL | Age: 85
End: 2025-03-05
Payer: MEDICARE

## 2025-03-05 DIAGNOSIS — R29.898 WEAKNESS OF LOWER EXTREMITY, UNSPECIFIED LATERALITY: ICD-10-CM

## 2025-03-05 DIAGNOSIS — R60.0 EDEMA OF BOTH LOWER LEGS: ICD-10-CM

## 2025-03-05 DIAGNOSIS — R26.89 IMPAIRED GAIT AND MOBILITY: Primary | ICD-10-CM

## 2025-03-05 PROCEDURE — 97530 THERAPEUTIC ACTIVITIES: CPT | Mod: HCNC,PO,CQ

## 2025-03-05 PROCEDURE — 97110 THERAPEUTIC EXERCISES: CPT | Mod: HCNC,PO,CQ

## 2025-03-05 NOTE — PROGRESS NOTES
"  Outpatient Rehab    Physical Therapy Visit    Patient Name: Nettie Villasenor  MRN: [id number]  YOB: 1940  Encounter Date: 3/5/2025    Therapy Diagnosis:   Encounter Diagnoses   Name Primary?    Impaired gait and mobility Yes    Weakness of lower extremity, unspecified laterality     Edema of both lower legs      Physician: Carolina Chavez MD    Physician Orders: Eval and Treat  Medical Diagnosis: R29.898 (ICD-10-CM) - Weakness of lower extremity, unspecified laterality     Visit # / Visits Authorized:  4 / 15 ( plus eval)  Date of Evaluation:  1/30/2025   Insurance Authorization Period: 1/23/25 to 1/23/26  Plan of Care Certification:  1/30/2025 to 3/27/25   Progress Note Due: 2/28/2025     Time In: 1030   Time Out: 1115  Total Time: 45   Total Billable Time: 45           Subjective   " I'm doing pretty good today.".  Family / care giver present for this visit:  (Caregiver remained in the lobby)         Objective            Treatment:  Therapeutic Exercise  Therapeutic Exercise Activity 1: x 8 min on Sci Fit recumbent stepper.  B UE/B LE, timer kept externally  Therapeutic Exercise Activity 2: Siting in W/C and performing B LE balloon kicks with Supervision         Therapeutic Activity  Therapeutic Activity 1: SPT from W/C <--> stepper seat with no A.D and Min A.  Min A to pivot safely  Therapeutic Activity 2: Pt performed sit to stands multiple times from W/C with no A.D and Min A.  Min A for safety  Therapeutic Activity 3: Pt performed sit to stand multiple times from W/C ---> RW with MIn A.  Min A for safety  Therapeutic Activity 4: Pt performed side stepping x 2 ft x 2 trials with HHA and CGA  Therapeutic Activity 5: Pt ambulated ~5 ft x 2 trials with RW and Min A.  Min A for balance and safety  Therapeutic Activity 6: Pt performed weight shifting right to left x 60 sec with RW and CGA    Assessment & Plan   Assessment:         Patient will continue to benefit from skilled outpatient physical " therapy to address the deficits listed in the problem list box on initial evaluation, provide pt/family education and to maximize pt's level of independence in the home and community environment.     Patient's spiritual, cultural, and educational needs considered and patient agreeable to plan of care and goals.           Plan: Cont with strengthening, endurance, gait, and functional mobility    Goals:   Active       Long Term Goal        Pt will improve bilaterally LE ankle all motions MMT scores to 2/5 for improved stability with stance and functional mobility.     (Progressing)       Start:  01/30/25    Expected End:  03/27/25            Pt will improve 30s chair rise score to at least 1 reps with SBA ONLY  and UE assist for improved muscular endurance.     (Progressing)       Start:  01/30/25    Expected End:  03/27/25            Pt will improve standing tolerance with CGA or less to 5 minutes for increased participation with standing ADLs.     (Progressing)       Start:  01/30/25    Expected End:  03/27/25            Pt will complete stand pivot/stand step transfers at SBA.  (Progressing)       Start:  01/30/25    Expected End:  03/27/25               short term goals       Pt will improve bilaterally LE hip all motions MMT scores to 4/5 for improved stability with stance and functional mobility.     (Progressing)       Start:  01/30/25    Expected End:  02/27/25            Pt will improve bilaterally LE knee all motions MMT scores to 4/5 for improved stability with stance and functional mobility.     (Progressing)       Start:  01/30/25    Expected End:  02/27/25            Pt will improve bilaterally LE ankle all motions MMT scores to 2/5 for improved stability with stance and functional mobility.     (Progressing)       Start:  01/30/25    Expected End:  02/27/25            Pt will improve 30s chair rise score to at least 3 reps with ROSHAN and UE assist for improved muscular endurance.     (Progressing)        Start:  01/30/25    Expected End:  02/27/25            Pt will improve standing tolerance with ROSHAN or less to 2 minutes for increased participation with standing ADLs.     (Progressing)       Start:  01/30/25    Expected End:  02/27/25                Chantal Ko, PTA

## 2025-03-10 ENCOUNTER — CLINICAL SUPPORT (OUTPATIENT)
Dept: REHABILITATION | Facility: HOSPITAL | Age: 85
End: 2025-03-10
Payer: MEDICARE

## 2025-03-10 DIAGNOSIS — R26.89 IMPAIRED GAIT AND MOBILITY: Primary | ICD-10-CM

## 2025-03-10 PROCEDURE — 97530 THERAPEUTIC ACTIVITIES: CPT | Mod: HCNC,PO

## 2025-03-10 PROCEDURE — 97110 THERAPEUTIC EXERCISES: CPT | Mod: HCNC,PO

## 2025-03-10 NOTE — PROGRESS NOTES
"  Outpatient Rehab    Physical Therapy Progress Note    Patient Name: Nettie Villasenor  MRN: [id number]  YOB: 1940  Encounter Date: 3/10/2025    Therapy Diagnosis:   Encounter Diagnosis   Name Primary?    Impaired gait and mobility Yes     Physician: Carolina Chavez MD    Physician Orders: Eval and Treat  Medical Diagnosis: R29.898 (ICD-10-CM) - Weakness of lower extremity, unspecified laterality      Visit # / Visits Authorized:  4 / 15 ( plus eval)  Date of Evaluation:  1/30/2025   Insurance Authorization Period: 1/23/25 to 1/23/26  Plan of Care Certification:  1/30/2025 to 3/27/25   Progress Note Due: 2/28/2025     Time In: 1115   Time Out: 1200  Total Time: 45   Total Billable Time: 45 minutes     FOTO:  Intake Score:  %  Survey Score 1:  %  Survey Score 2:  %         Subjective   "i want to walk again".  Pain reported as 0/10.      Objective         Lower Extremity Strength  Right LE  Eval  3/10/2025 Left LE  eval  3/10/2025   Hip Flexion: 3/5 3+/5 Hip Flexion: 3/5 3+/5   Hip Extension:  3/5 3+/5 Hip Extension: 3/5 3+/5   Hip Abduction: 3+/5 3/5 Hip Abduction: 3+/5 3/5   Hip Adduction: 3/5 3+/5 Hip Adduction 3/5 3+/5   Knee Extension: 3/5 3/5 Knee Extension: 3/5 3/5   Knee Flexion: 3/5 3/5 Knee Flexion: 3/5 3/5   Ankle Dorsiflexion: trace trace Ankle Dorsiflexion: trace trace   Ankle Plantarflexion: trace trace Ankle Plantarflexion: trace Trace      Abdominal Strength: 3/5     Flexibility: impaired - moderate       Evaluation 01/30/2025 3/10/2025   30 second Chair Rise  (normative values to maintain physical independence: 11) 1 completed with arms with Juan byPT    3 completed with arms with minimal assist             GAIT ASSESSMENT - non ambulatory          Endurance Deficit: severe  Standing tolerance with Juan: 3 minutes 10 seconds   Standing posture: left hip into adduction, knee into valgus, hips unlevel with left hip elevated       Stand pivot transfer from  to the SCIFIT with moderate " assist using the RW           Treatment:  Therapeutic Exercise  Therapeutic Exercise Activity 1: x 10 min on Sci Fit recumbent stepper.  B UE/B LE, timer kept externally    Therapeutic Activity  Therapeutic Activity 1: SPT from W/C <--> stepper seat with no A.D and modA  to pivot safely  Therapeutic Activity 2: Pt performed sit to stands multiple times from W/C with no A.D and Min A.  Min A for safety  Therapeutic Activity 3: time for obejctive measures    Assessment & Plan   Assessment: Nettie tolerated today's reassessment session well this morning. IMprovements noted with Nettie's standing tolerance and 30 second chair rise score. Nettie's MMT scores may not be acurate due to difficutly following commands. Nettie becomes emotional throughout session and is easily destracted. Limitiations to progress with therapy will likely continue to be limited carry over between sessions and poor command following. The patient would benefit from continued physical therapy intervention to address remaining functional mobility deficits.  Evaluation/Treatment Tolerance: Patient tolerated treatment well    Patient will continue to benefit from skilled outpatient physical therapy to address the deficits listed in the problem list box on initial evaluation, provide pt/family education and to maximize pt's level of independence in the home and community environment.     Patient's spiritual, cultural, and educational needs considered and patient agreeable to plan of care and goals.           Plan: Cont with strengthening, endurance, gait, and functional mobility    Goals:   Active       Long Term Goal        Pt will improve bilaterally LE ankle all motions MMT scores to 2/5 for improved stability with stance and functional mobility.     (Progressing)       Start:  01/30/25    Expected End:  03/27/25            Pt will improve 30s chair rise score to at least 1 reps with SBA ONLY  and UE assist for improved muscular endurance.      (Progressing)       Start:  01/30/25    Expected End:  03/27/25            Pt will improve standing tolerance with CGA or less to 5 minutes for increased participation with standing ADLs.     (Progressing)       Start:  01/30/25    Expected End:  03/27/25            Pt will complete stand pivot/stand step transfers at SBA.  (Progressing)       Start:  01/30/25    Expected End:  03/27/25               short term goals       Pt will improve bilaterally LE hip all motions MMT scores to 4/5 for improved stability with stance and functional mobility.     (Progressing)       Start:  01/30/25    Expected End:  02/27/25            Pt will improve bilaterally LE knee all motions MMT scores to 4/5 for improved stability with stance and functional mobility.     (Progressing)       Start:  01/30/25    Expected End:  02/27/25            Pt will improve bilaterally LE ankle all motions MMT scores to 2/5 for improved stability with stance and functional mobility.     (Progressing)       Start:  01/30/25    Expected End:  02/27/25            Pt will improve 30s chair rise score to at least 3 reps with ROSHAN and UE assist for improved muscular endurance.     (Met)       Start:  01/30/25    Expected End:  02/27/25    Resolved:  03/10/25         Pt will improve standing tolerance with ROSHAN or less to 2 minutes for increased participation with standing ADLs.     (Met)       Start:  01/30/25    Expected End:  02/27/25    Resolved:  03/10/25             Ariadna Mckeon, PT

## 2025-03-12 ENCOUNTER — CLINICAL SUPPORT (OUTPATIENT)
Dept: REHABILITATION | Facility: HOSPITAL | Age: 85
End: 2025-03-12
Payer: MEDICARE

## 2025-03-12 DIAGNOSIS — R60.0 EDEMA OF BOTH LOWER LEGS: ICD-10-CM

## 2025-03-12 DIAGNOSIS — R26.89 IMPAIRED GAIT AND MOBILITY: Primary | ICD-10-CM

## 2025-03-12 DIAGNOSIS — R29.898 WEAKNESS OF LOWER EXTREMITY, UNSPECIFIED LATERALITY: ICD-10-CM

## 2025-03-12 PROCEDURE — 97530 THERAPEUTIC ACTIVITIES: CPT | Mod: HCNC,PO,CQ

## 2025-03-12 PROCEDURE — 97110 THERAPEUTIC EXERCISES: CPT | Mod: HCNC,PO,CQ

## 2025-03-12 NOTE — PROGRESS NOTES
"  Outpatient Rehab    Physical Therapy Visit    Patient Name: Nettie Villasenor  MRN: [id number]  YOB: 1940  Encounter Date: 3/12/2025    Therapy Diagnosis:   Encounter Diagnoses   Name Primary?    Impaired gait and mobility Yes    Weakness of lower extremity, unspecified laterality     Edema of both lower legs      Physician: Carolina Chavez MD    Physician Orders: Eval and Treat  Medical Diagnosis: R29.898 (ICD-10-CM) - Weakness of lower extremity, unspecified laterality     Visit # / Visits Authorized:  5 / 15 ( plus eval)  Date of Evaluation:  1/30/2025   Insurance Authorization Period: 1/23/25 to 1/23/26  Plan of Care Certification:  1/30/2025 to 3/27/25   Progress Note Due: 2/28/2025     Time In: 1115   Time Out: 1200  Total Time: 45   Total Billable Time: 45           Subjective   " Hi Chantal, How are you today.".  Family / care giver present for this visit:   Pain reported as 0/10.      Objective            Treatment:  Therapeutic Exercise  Therapeutic Exercise Activity 1: x 10 min on Sci Fit recumbent stepper.   B UE/B LE on level 1.0         Therapeutic Activity  Therapeutic Activity 1: SPT from W/C ---> EOM with RW and Min A.  MIn A to pivot around safely  Therapeutic Activity 2: Sit to stand from EOM ---> RW with CGA  Therapeutic Activity 3: Pt performed static standing x 2 min with RW and CGA  Therapeutic Activity 4: Pt ambulated ~ 3 steps forward with RW and CGA.   VC's to take big steps  Therapeutic Activity 5: SPT from W/C <> toilet with 1 HR and MIn A.  Therapeutic Activity 6: Donning on/off LB dressing with Max A.  CGA for standing    Assessment & Plan   Assessment: Nettie tolerated her tx session well and she remains motivated.  Nettie gets emotional at times, but able to overcome it and participate in therapy session.  Nettie was not able to stand as long today or ambulate as far, but did have to use the rest room.  Evaluation/Treatment Tolerance: Patient tolerated treatment well    Patient " will continue to benefit from skilled outpatient physical therapy to address the deficits listed in the problem list box on initial evaluation, provide pt/family education and to maximize pt's level of independence in the home and community environment.     Patient's spiritual, cultural, and educational needs considered and patient agreeable to plan of care and goals.           Plan: Cont with standing, gait, functional mobility and endurance    Goals:   Active       Long Term Goal        Pt will improve bilaterally LE ankle all motions MMT scores to 2/5 for improved stability with stance and functional mobility.     (Progressing)       Start:  01/30/25    Expected End:  03/27/25            Pt will improve 30s chair rise score to at least 1 reps with SBA ONLY  and UE assist for improved muscular endurance.     (Progressing)       Start:  01/30/25    Expected End:  03/27/25            Pt will improve standing tolerance with CGA or less to 5 minutes for increased participation with standing ADLs.     (Progressing)       Start:  01/30/25    Expected End:  03/27/25            Pt will complete stand pivot/stand step transfers at SBA.  (Progressing)       Start:  01/30/25    Expected End:  03/27/25               short term goals       Pt will improve bilaterally LE hip all motions MMT scores to 4/5 for improved stability with stance and functional mobility.     (Progressing)       Start:  01/30/25    Expected End:  02/27/25            Pt will improve bilaterally LE knee all motions MMT scores to 4/5 for improved stability with stance and functional mobility.     (Progressing)       Start:  01/30/25    Expected End:  02/27/25            Pt will improve bilaterally LE ankle all motions MMT scores to 2/5 for improved stability with stance and functional mobility.     (Progressing)       Start:  01/30/25    Expected End:  02/27/25            Pt will improve 30s chair rise score to at least 3 reps with ROSHAN and UE assist for  improved muscular endurance.     (Met)       Start:  01/30/25    Expected End:  02/27/25    Resolved:  03/10/25         Pt will improve standing tolerance with ROSHAN or less to 2 minutes for increased participation with standing ADLs.     (Met)       Start:  01/30/25    Expected End:  02/27/25    Resolved:  03/10/25             Chantal Ko, PTA

## 2025-03-13 ENCOUNTER — PATIENT OUTREACH (OUTPATIENT)
Dept: ADMINISTRATIVE | Facility: HOSPITAL | Age: 85
End: 2025-03-13
Payer: MEDICARE

## 2025-03-13 NOTE — PROGRESS NOTES
Chart reviewed and updated. Reconciled immunizations. Uploaded infusion consent.  Judi Law LPN   Clinical Care Coordinator  [facility]

## 2025-03-17 ENCOUNTER — CLINICAL SUPPORT (OUTPATIENT)
Dept: REHABILITATION | Facility: HOSPITAL | Age: 85
End: 2025-03-17
Payer: MEDICARE

## 2025-03-17 DIAGNOSIS — R60.0 EDEMA OF BOTH LOWER LEGS: ICD-10-CM

## 2025-03-17 DIAGNOSIS — R26.89 IMPAIRED GAIT AND MOBILITY: Primary | ICD-10-CM

## 2025-03-17 DIAGNOSIS — R29.898 WEAKNESS OF LOWER EXTREMITY, UNSPECIFIED LATERALITY: ICD-10-CM

## 2025-03-17 PROCEDURE — 97110 THERAPEUTIC EXERCISES: CPT | Mod: HCNC,PO

## 2025-03-17 NOTE — PROGRESS NOTES
"  Outpatient Rehab    Physical Therapy Visit    Patient Name: Nettie Villasenor  MRN: [id number]  YOB: 1940  Encounter Date: 3/17/2025    Therapy Diagnosis:   Encounter Diagnoses   Name Primary?    Impaired gait and mobility Yes    Weakness of lower extremity, unspecified laterality     Edema of both lower legs      Physician: Carolina Chavez MD    Physician Orders: Eval and Treat  Medical Diagnosis: Weakness of lower extremity, unspecified laterality    Visit # / Visits Authorized:  7 / 15  Date of Evaluation:  1/30/2025   Insurance Authorization Period: 2/3/2025 to 12/31/2025  Plan of Care Certification:  1/30/2025 to 3/27/25   Progress Note Due: 2/28/2025     PT/PTA:     Number of PTA visits since last PT visit:   Time In: 1115   Time Out: 1200  Total Time: 45   Total Billable Time: 45 minutes     FOTO:  Intake Score:  %  Survey Score 1:  %  Survey Score 2:  %         Subjective   "I am doing great!".  Family / care giver present for this visit:   Pain reported as 0/10.      Objective            Treatment:  Therapeutic Exercise  TE 2: Siting in W/C and performing B LE balloon kicks with Supervision and 1.5# ankle weights  TE 3: Seated EOM: 2x10 reps LAQ each side with 1.5 # ankle weights  TE 4: 10 reps modified sit up from wedge with feet on 4 inch step  TE 5: 2 x 10 reps seated marches on EOM  TE 6: 2 x 10 reps seated hip abduction against red TB  TE 7: transfer from mat to  with stand pivot and modA due to instability with improper footwear  TE 8: transfer from mat to  with stand pivot and modA due to instability with improper footwear      Time Entry(in minutes):  Therapeutic Exercise Time Entry: 45    Assessment & Plan   Assessment: Nettie tolerated her tx session well despite session being limited to mat strength excises due to the patient not having adequate footwear to stand or perform transfers. Significant assist required fro the patient to tranfer to/from the mat due to the patient's " bilateral feet slipping forward with her socks on.  The patient would likely benefit from continued PT intervention to address remaining functional mobility deficits.  Evaluation/Treatment Tolerance: Patient tolerated treatment well    Patient will continue to benefit from skilled outpatient physical therapy to address the deficits listed in the problem list box on initial evaluation, provide pt/family education and to maximize pt's level of independence in the home and community environment.     Patient's spiritual, cultural, and educational needs considered and patient agreeable to plan of care and goals.           Plan: Cont with standing, gait, functional mobility and endurance    Goals:   Active       Long Term Goal        Pt will improve bilaterally LE ankle all motions MMT scores to 2/5 for improved stability with stance and functional mobility.     (Progressing)       Start:  01/30/25    Expected End:  03/27/25            Pt will improve 30s chair rise score to at least 1 reps with SBA ONLY  and UE assist for improved muscular endurance.     (Progressing)       Start:  01/30/25    Expected End:  03/27/25            Pt will improve standing tolerance with CGA or less to 5 minutes for increased participation with standing ADLs.     (Progressing)       Start:  01/30/25    Expected End:  03/27/25            Pt will complete stand pivot/stand step transfers at SBA.  (Progressing)       Start:  01/30/25    Expected End:  03/27/25               short term goals       Pt will improve bilaterally LE hip all motions MMT scores to 4/5 for improved stability with stance and functional mobility.     (Progressing)       Start:  01/30/25    Expected End:  02/27/25            Pt will improve bilaterally LE knee all motions MMT scores to 4/5 for improved stability with stance and functional mobility.     (Progressing)       Start:  01/30/25    Expected End:  02/27/25            Pt will improve bilaterally LE ankle all  motions MMT scores to 2/5 for improved stability with stance and functional mobility.     (Progressing)       Start:  01/30/25    Expected End:  02/27/25            Pt will improve 30s chair rise score to at least 3 reps with ROSHAN and UE assist for improved muscular endurance.     (Met)       Start:  01/30/25    Expected End:  02/27/25    Resolved:  03/10/25         Pt will improve standing tolerance with ROSHAN or less to 2 minutes for increased participation with standing ADLs.     (Met)       Start:  01/30/25    Expected End:  02/27/25    Resolved:  03/10/25             Ariadna Mckeon, PT

## 2025-03-19 ENCOUNTER — DOCUMENTATION ONLY (OUTPATIENT)
Dept: REHABILITATION | Facility: HOSPITAL | Age: 85
End: 2025-03-19
Payer: MEDICARE

## 2025-03-19 NOTE — PROGRESS NOTES
Documentation Only/ No Show      Patient: Nettie iVllasenor  Date of Session: 3/19/2025  Diagnosis: No diagnosis found.  MRN: [id number]  Nettie Villasenor did not attend his/her scheduled therapy appointment today. Nettie did not call to cancel nor reschedule. This is the 1st appointment that she has not attended. Next appointment is scheduled for 3/24/25 will follow up with patient at that time. No charges have been posted today.       Chantal Ko, PTA  03/19/2025

## 2025-03-24 ENCOUNTER — CLINICAL SUPPORT (OUTPATIENT)
Dept: REHABILITATION | Facility: HOSPITAL | Age: 85
End: 2025-03-24
Payer: MEDICARE

## 2025-03-24 DIAGNOSIS — R26.89 IMPAIRED GAIT AND MOBILITY: Primary | ICD-10-CM

## 2025-03-24 DIAGNOSIS — R60.0 EDEMA OF BOTH LOWER LEGS: ICD-10-CM

## 2025-03-24 DIAGNOSIS — R29.898 WEAKNESS OF LOWER EXTREMITY, UNSPECIFIED LATERALITY: ICD-10-CM

## 2025-03-24 PROCEDURE — 97530 THERAPEUTIC ACTIVITIES: CPT | Mod: HCNC,PO

## 2025-03-24 NOTE — PROGRESS NOTES
"  Outpatient Rehab    Physical Therapy Discharge    Patient Name: Nettie Villasenor  MRN: [id number]  YOB: 1940  Encounter Date: 3/24/2025    Therapy Diagnosis:   Encounter Diagnoses   Name Primary?    Impaired gait and mobility Yes    Weakness of lower extremity, unspecified laterality     Edema of both lower legs      Physician: Carolina Chavez MD    Physician Orders: Eval and Treat  Medical Diagnosis: Weakness of lower extremity, unspecified laterality    Visit # / Visits Authorized:  8 / 15  Insurance Authorization Period: 2/3/2025 to 12/31/2025  Date of Evaluation: 1/30/2025   Plan of Care Certification:  1/30/2025 to 3/27/25   Progress Note Due: 2/28/2025     PT/PTA:     Number of PTA visits since last PT visit:   Time In: 1130   Time Out: 1200  Total Time: 30   Total Billable Time:  30 minutes    FOTO:  Intake Score:  %  Survey Score 1:  %  Survey Score 2:  %         Subjective   "I love it here".  Family / care giver present for this visit:   Pain reported as 0/10.      Objective        Lower Extremity Strength  Right LE  Eval  3/24/2025 Left LE  Eval  3/24/2025   Hip Flexion: 3/5 3/5 Hip Flexion: 3/5 3/5   Hip Extension:  3/5 3/5 Hip Extension: 3/5 3/5   Hip Abduction: 3+/5 3/5 Hip Abduction: 3+/5 3/5   Hip Adduction: 3/5 3/5 Hip Adduction 3/5 3/5   Knee Extension: 3/5 3/5 Knee Extension: 3/5 3/5   Knee Flexion: 3/5 3/5 Knee Flexion: 3/5 3/5   Ankle Dorsiflexion: trace Trace  Ankle Dorsiflexion: trace Trace    Ankle Plantarflexion: trace Trace  Ankle Plantarflexion: trace trace         Evaluation 01/30/2025 3/24/2025   30 second Chair Rise  (normative values to maintain physical independence: 11) 1 completed with arms with Juan byPT    3 completed with no arms          Balance Testing   Sitting balance static: fair  Sitting balance dynamic: fair  Standing balance static: poor  Standing balance dynamic:  poor       Endurance Deficit: severe  Standing tolerance with Juan: 30 seconds initially, 5 " minutes with therapist talking to patient for destraction     Transfer: stand pivot with the RW to/from the mat: minimal assist       Treatment:   Objective measures listed above and discussion of discharge     Time Entry(in minutes):  Therapeutic Activity Time Entry: 30      Goals:   Active       Long Term Goal        Pt will improve bilaterally LE ankle all motions MMT scores to 2/5 for improved stability with stance and functional mobility.     (Progressing)       Start:  01/30/25    Expected End:  03/27/25            Pt will improve 30s chair rise score to at least 1 reps with SBA ONLY  and UE assist for improved muscular endurance.     (Progressing)       Start:  01/30/25    Expected End:  03/27/25            Pt will improve standing tolerance with CGA or less to 5 minutes for increased participation with standing ADLs.     (Progressing)       Start:  01/30/25    Expected End:  03/27/25            Pt will complete stand pivot/stand step transfers at SBA.  (Progressing)       Start:  01/30/25    Expected End:  03/27/25               short term goals       Pt will improve bilaterally LE hip all motions MMT scores to 4/5 for improved stability with stance and functional mobility.     (Progressing)       Start:  01/30/25    Expected End:  02/27/25            Pt will improve bilaterally LE knee all motions MMT scores to 4/5 for improved stability with stance and functional mobility.     (Progressing)       Start:  01/30/25    Expected End:  02/27/25            Pt will improve bilaterally LE ankle all motions MMT scores to 2/5 for improved stability with stance and functional mobility.     (Progressing)       Start:  01/30/25    Expected End:  02/27/25            Pt will improve 30s chair rise score to at least 3 reps with ROSHAN and UE assist for improved muscular endurance.     (Met)       Start:  01/30/25    Expected End:  02/27/25    Resolved:  03/10/25         Pt will improve standing tolerance with ROSHAN or less  to 2 minutes for increased participation with standing ADLs.     (Met)       Start:  01/30/25    Expected End:  02/27/25    Resolved:  03/10/25             Ariadna Mckeon PT    PHYSICAL THERAPY DISCHARGE SUMMARY   Total Visits:8  Missed Visits:0  Cancelled Visits: 0    Status Towards Goals Met: The patient met 2 short term goals.     Goals Not achieved and why:   The patient did not meet the majority of her short and long term goals likely due to significant cognitive impairments limiting carry over between sessions. The patient's MMT scores were likely not accurate due to cognitive impairments and difficulty following commands. The patient did show improvement with her standing tolerance but only when the therapist was actively distracting the patient through constant conversation and questions. The patient was educated on the importance of maintaining her mobility following discharge and she verbilizes understanding but will likely need reinforcement from staff.         Discharge reason : Patient has maximized benefit from PT at this time    PLAN   This patient is discharged from Outpatient Physical Therapy Services.     Ariadna Mckeon, PT  03/25/2025

## 2025-04-02 ENCOUNTER — INFUSION (OUTPATIENT)
Dept: INFECTIOUS DISEASES | Facility: HOSPITAL | Age: 85
End: 2025-04-02
Payer: MEDICARE

## 2025-04-02 VITALS
WEIGHT: 165 LBS | RESPIRATION RATE: 20 BRPM | OXYGEN SATURATION: 97 % | TEMPERATURE: 98 F | HEIGHT: 60 IN | SYSTOLIC BLOOD PRESSURE: 124 MMHG | DIASTOLIC BLOOD PRESSURE: 62 MMHG | HEART RATE: 70 BPM | BODY MASS INDEX: 32.39 KG/M2

## 2025-04-02 DIAGNOSIS — M81.0 AGE-RELATED OSTEOPOROSIS WITHOUT CURRENT PATHOLOGICAL FRACTURE: Primary | ICD-10-CM

## 2025-04-02 PROCEDURE — 63600175 PHARM REV CODE 636 W HCPCS: Mod: JZ,TB,HCNC | Performed by: INTERNAL MEDICINE

## 2025-04-02 PROCEDURE — 96372 THER/PROPH/DIAG INJ SC/IM: CPT | Mod: HCNC

## 2025-04-02 RX ADMIN — DENOSUMAB 60 MG: 60 INJECTION SUBCUTANEOUS at 11:04

## 2025-04-02 NOTE — PROGRESS NOTES
Patient arrives for Prolia injection - confirms use of calcium and vitamin D supplements and denies dental procedures over past 3 months - administered per guidelines.    Next appt scheduled, pt provided appt card.     Limited head-to-toe assessment due to privacy issues and visit reason though the opportunity was given for patient to express any concerns

## 2025-05-14 ENCOUNTER — OFFICE VISIT (OUTPATIENT)
Dept: INTERNAL MEDICINE | Facility: CLINIC | Age: 85
End: 2025-05-14
Payer: MEDICARE

## 2025-05-14 ENCOUNTER — LAB VISIT (OUTPATIENT)
Dept: LAB | Facility: HOSPITAL | Age: 85
End: 2025-05-14
Attending: INTERNAL MEDICINE
Payer: MEDICARE

## 2025-05-14 VITALS
BODY MASS INDEX: 27.23 KG/M2 | HEIGHT: 60 IN | DIASTOLIC BLOOD PRESSURE: 58 MMHG | SYSTOLIC BLOOD PRESSURE: 124 MMHG | WEIGHT: 138.69 LBS

## 2025-05-14 DIAGNOSIS — Z12.31 SCREENING MAMMOGRAM FOR BREAST CANCER: ICD-10-CM

## 2025-05-14 DIAGNOSIS — F70 MILD INTELLECTUAL DISABILITY: Primary | ICD-10-CM

## 2025-05-14 DIAGNOSIS — E55.9 VITAMIN D DEFICIENCY DISEASE: ICD-10-CM

## 2025-05-14 DIAGNOSIS — C50.912 INFILTRATING DUCTAL CARCINOMA OF LEFT BREAST: ICD-10-CM

## 2025-05-14 DIAGNOSIS — R93.89 ABNORMAL FINDINGS ON DIAGNOSTIC IMAGING OF OTHER SPECIFIED BODY STRUCTURES: ICD-10-CM

## 2025-05-14 DIAGNOSIS — F70 MILD INTELLECTUAL DISABILITY: ICD-10-CM

## 2025-05-14 DIAGNOSIS — F20.9 SCHIZOPHRENIA, UNSPECIFIED TYPE: ICD-10-CM

## 2025-05-14 DIAGNOSIS — M81.0 OSTEOPOROSIS, UNSPECIFIED OSTEOPOROSIS TYPE, UNSPECIFIED PATHOLOGICAL FRACTURE PRESENCE: ICD-10-CM

## 2025-05-14 DIAGNOSIS — F39 MOOD DISORDER: ICD-10-CM

## 2025-05-14 DIAGNOSIS — E53.8 VITAMIN B12 DEFICIENCY: ICD-10-CM

## 2025-05-14 DIAGNOSIS — R79.9 ABNORMAL BLOOD CHEMISTRY: ICD-10-CM

## 2025-05-14 DIAGNOSIS — E04.1 THYROID NODULE: ICD-10-CM

## 2025-05-14 LAB
25(OH)D3+25(OH)D2 SERPL-MCNC: 56 NG/ML (ref 30–96)
ABSOLUTE EOSINOPHIL (OHS): 0.13 K/UL
ABSOLUTE MONOCYTE (OHS): 0.39 K/UL (ref 0.3–1)
ABSOLUTE NEUTROPHIL COUNT (OHS): 2.42 K/UL (ref 1.8–7.7)
ALBUMIN SERPL BCP-MCNC: 3.2 G/DL (ref 3.5–5.2)
ALP SERPL-CCNC: 87 UNIT/L (ref 40–150)
ALT SERPL W/O P-5'-P-CCNC: 16 UNIT/L (ref 10–44)
ANION GAP (OHS): 10 MMOL/L (ref 8–16)
AST SERPL-CCNC: 17 UNIT/L (ref 11–45)
BASOPHILS # BLD AUTO: 0.03 K/UL
BASOPHILS NFR BLD AUTO: 0.6 %
BILIRUB SERPL-MCNC: 0.2 MG/DL (ref 0.1–1)
BUN SERPL-MCNC: 30 MG/DL (ref 8–23)
CALCIUM SERPL-MCNC: 9.4 MG/DL (ref 8.7–10.5)
CHLORIDE SERPL-SCNC: 105 MMOL/L (ref 95–110)
CO2 SERPL-SCNC: 26 MMOL/L (ref 23–29)
CREAT SERPL-MCNC: 0.7 MG/DL (ref 0.5–1.4)
EAG (OHS): 105 MG/DL (ref 68–131)
ERYTHROCYTE [DISTWIDTH] IN BLOOD BY AUTOMATED COUNT: 13.1 % (ref 11.5–14.5)
FERRITIN SERPL-MCNC: 60 NG/ML (ref 20–300)
GFR SERPLBLD CREATININE-BSD FMLA CKD-EPI: >60 ML/MIN/1.73/M2
GLUCOSE SERPL-MCNC: 76 MG/DL (ref 70–110)
HBA1C MFR BLD: 5.3 % (ref 4–5.6)
HCT VFR BLD AUTO: 31.5 % (ref 37–48.5)
HGB BLD-MCNC: 9.5 GM/DL (ref 12–16)
IMM GRANULOCYTES # BLD AUTO: 0.01 K/UL (ref 0–0.04)
IMM GRANULOCYTES NFR BLD AUTO: 0.2 % (ref 0–0.5)
IRON SATN MFR SERPL: 24 % (ref 20–50)
IRON SERPL-MCNC: 70 UG/DL (ref 30–160)
LYMPHOCYTES # BLD AUTO: 1.75 K/UL (ref 1–4.8)
MCH RBC QN AUTO: 33 PG (ref 27–31)
MCHC RBC AUTO-ENTMCNC: 30.2 G/DL (ref 32–36)
MCV RBC AUTO: 109 FL (ref 82–98)
NUCLEATED RBC (/100WBC) (OHS): 0 /100 WBC
PLATELET # BLD AUTO: 335 K/UL (ref 150–450)
PMV BLD AUTO: 12.3 FL (ref 9.2–12.9)
POTASSIUM SERPL-SCNC: 4.2 MMOL/L (ref 3.5–5.1)
PROT SERPL-MCNC: 7.3 GM/DL (ref 6–8.4)
RBC # BLD AUTO: 2.88 M/UL (ref 4–5.4)
RELATIVE EOSINOPHIL (OHS): 2.7 %
RELATIVE LYMPHOCYTE (OHS): 37 % (ref 18–48)
RELATIVE MONOCYTE (OHS): 8.2 % (ref 4–15)
RELATIVE NEUTROPHIL (OHS): 51.3 % (ref 38–73)
SODIUM SERPL-SCNC: 141 MMOL/L (ref 136–145)
TIBC SERPL-MCNC: 292 UG/DL (ref 250–450)
TRANSFERRIN SERPL-MCNC: 197 MG/DL (ref 200–375)
TSH SERPL-ACNC: 0.55 UIU/ML (ref 0.4–4)
VIT B12 SERPL-MCNC: 607 PG/ML (ref 210–950)
WBC # BLD AUTO: 4.73 K/UL (ref 3.9–12.7)

## 2025-05-14 PROCEDURE — 84295 ASSAY OF SERUM SODIUM: CPT | Mod: HCNC

## 2025-05-14 PROCEDURE — 84443 ASSAY THYROID STIM HORMONE: CPT | Mod: HCNC

## 2025-05-14 PROCEDURE — 82607 VITAMIN B-12: CPT | Mod: HCNC

## 2025-05-14 PROCEDURE — 82306 VITAMIN D 25 HYDROXY: CPT | Mod: HCNC

## 2025-05-14 PROCEDURE — 85025 COMPLETE CBC W/AUTO DIFF WBC: CPT | Mod: HCNC

## 2025-05-14 PROCEDURE — 99999 PR PBB SHADOW E&M-EST. PATIENT-LVL III: CPT | Mod: PBBFAC,HCNC,, | Performed by: INTERNAL MEDICINE

## 2025-05-14 PROCEDURE — 82728 ASSAY OF FERRITIN: CPT | Mod: HCNC

## 2025-05-14 PROCEDURE — 84466 ASSAY OF TRANSFERRIN: CPT | Mod: HCNC

## 2025-05-14 PROCEDURE — 36415 COLL VENOUS BLD VENIPUNCTURE: CPT | Mod: HCNC

## 2025-05-14 PROCEDURE — 83036 HEMOGLOBIN GLYCOSYLATED A1C: CPT | Mod: HCNC

## 2025-05-14 NOTE — PROGRESS NOTES
CHIEF COMPLAINT: Follow up of multiple problems     HPI: This is a 84 year old woman from [place] who presents for above     She is with Victoriano from [facility] services today    She had a fall with ER visit 8/2024.    She did outpatient physical therapy in Feb and March 2024 for weakness which helped. She is able to ambulate a little better in the group home. She gets around in a wheelchair for long distances due to knee pain and unstable gait.      She denies any pain today.     She was diagnosed with breast cancer - ER+, UT+, Her2- IDC of the left breast s/p left breast lumpectomy on 9/4/20. Negative margins. Adjuvant XRT was deferred. Patient started adjuvant endocrine therapy in October of 2020.  She is taking anastrozole 1 mg daily.      No neck pain.  She walks with her neck flexed - has been like this for a very long time. She has slight knee pain which comes and goes. She is taking tylenol in the evening which is helping.      Nettie gets Prolia injections for her osteoporosis on - last injection 4/2/25. Alendronate was stopped due to fracture from fall.  BMD 11/2022 -osteoporosis - hip had worsened- Alendronate was stopped 12/2022      Her anxiety has been controlled on Prozac 20 mg daily and abilify 2 mg daily. No depression. Sleeping well. She denies hallucinations or insomnia. She continues to like to live in her group home at [facility]. Stable thyroid ultrasound 4/17. .            PAST MEDICAL HISTORY:   1. Mild mentally handicapped.   2. Schizophrenia.   3. A thyroid nodule, status post fine needle aspirate on 05/15/2003 , 09/2003 and 12/2006 (negative). Ultrasound in 2011 was stable from 2006  4. History of chest pain with a negative stress echocardiogram in February 2003.   5. Osteopenia on bone mineral density June 2004, improved in bone density 4/07. No treatmetn 4/2015  6. Breast cancer diagnosed 9/2020 - ER+, UT+, Her2- IDC of the left breast s/p left breast lumpectomy on  20. Negative margins. Adjuvant XRT was deferred. Patient started adjuvant endocrine therapy in 2020.     PAST SURGICAL HISTORY: Tonsillectomy and status post ORIF of the left leg.    MEDICATIONS: updated on epic    No known drug allergies.     SOCIAL HISTORY: No alcohol or tobacco. Resident of [facility].    FAMILY HISTORY: Her father  in his late 30s of a heart attack. Her mother  of Alzheimer's. A brother  in an explosion.         PHYSICAL EXAM:    BP (!) 124/58 (BP Location: Right arm, Patient Position: Sitting)   Ht 5' (1.524 m)   Wt 62.9 kg (138 lb 10.7 oz)   BMI 27.08 kg/m²       GENERAL: She is alert, oriented, in no apparent distress.   Affect within normal limits. Conjunctivae anicteric. Pupils equal, round, and reactive   to light. EOMI. Tympanic membranes clear. Oropharynx clear.   NECK: Supple. No cervical lymphadenopathy. Left lobe of the thyroid was enlarged.. No JVD.   RESPIRATORY: Effort normal.   LUNGS: Clear to auscultation.   HEART: Regular rate and rhythm without murmurs, gallops, or rubs. No lower extremity edema.   ABDOMEN: soft, non distended, non tender, bowel sounds present, no hepatosplenomgaly  BREAST: no abn seen, no nodules palpated, no axillary lad          ASSESSMENT AND PLAN:   1. Mild mentally handicapped, resident of [facility]. A 90L Form filled out.    2. Thyroid nodule - us thyroid stable 2022  3. Lung nodule - Ct chest 2022 and 2022  4. Gait abnormality - s/p physical therapy with some improvement  5. Mood disorder/Schizophrenia, doing well   6. Osteoporosis : on Prolia injections last 25.  BMD  7. Breast cancer -  on anastrazole. Get back to [facility] onc      Screening. MMG - ordered  She had a normal colonscopy May 2009  I will see Nettie back in 1 year, sooner if problems arise I see her every 2 weeks at [facility]     Visit today included increased complexity associated with the care of the episodic problem  addressed and  managing the longitudinal care of the patient due to the serious and/or complex managed problem(s) mild mentally handicapped, schizopherenia, breast cancer, gait abnormality, lung nodule, thyroid nodule.

## 2025-07-03 ENCOUNTER — OFFICE VISIT (OUTPATIENT)
Dept: HEMATOLOGY/ONCOLOGY | Facility: CLINIC | Age: 85
End: 2025-07-03
Payer: MEDICARE

## 2025-07-03 VITALS
RESPIRATION RATE: 18 BRPM | SYSTOLIC BLOOD PRESSURE: 124 MMHG | OXYGEN SATURATION: 99 % | TEMPERATURE: 98 F | HEIGHT: 60 IN | DIASTOLIC BLOOD PRESSURE: 60 MMHG | HEART RATE: 78 BPM | BODY MASS INDEX: 27.08 KG/M2

## 2025-07-03 DIAGNOSIS — Z79.811 PROPHYLACTIC USE OF ANASTROZOLE (ARIMIDEX): Primary | ICD-10-CM

## 2025-07-03 PROCEDURE — 1159F MED LIST DOCD IN RCRD: CPT | Mod: CPTII,S$GLB,, | Performed by: INTERNAL MEDICINE

## 2025-07-03 PROCEDURE — 99214 OFFICE O/P EST MOD 30 MIN: CPT | Mod: S$GLB,,, | Performed by: INTERNAL MEDICINE

## 2025-07-03 PROCEDURE — 3074F SYST BP LT 130 MM HG: CPT | Mod: CPTII,S$GLB,, | Performed by: INTERNAL MEDICINE

## 2025-07-03 PROCEDURE — 3288F FALL RISK ASSESSMENT DOCD: CPT | Mod: CPTII,S$GLB,, | Performed by: INTERNAL MEDICINE

## 2025-07-03 PROCEDURE — 3078F DIAST BP <80 MM HG: CPT | Mod: CPTII,S$GLB,, | Performed by: INTERNAL MEDICINE

## 2025-07-03 PROCEDURE — 99999 PR PBB SHADOW E&M-EST. PATIENT-LVL III: CPT | Mod: PBBFAC,HCNC,, | Performed by: INTERNAL MEDICINE

## 2025-07-03 PROCEDURE — 1101F PT FALLS ASSESS-DOCD LE1/YR: CPT | Mod: CPTII,S$GLB,, | Performed by: INTERNAL MEDICINE

## 2025-07-03 PROCEDURE — 1126F AMNT PAIN NOTED NONE PRSNT: CPT | Mod: CPTII,S$GLB,, | Performed by: INTERNAL MEDICINE

## 2025-07-03 NOTE — PROGRESS NOTES
Subjective:       Patient ID: Nettie Villasenor is a 84 y.o. female.    Chief Complaint: Infiltrating ductal carcinoma of left breast    HPI     Ms. Villasenor presents today for follow-up.  This is the second time that I am seeing her. I had seen her once, in July of 2022 She was previously followed by Dr. Nava and Dr. Otero.   I have reviewed the pertinent notes and I will summarize her course as follows:  Briefly, she is 84 years old, and she lives at the [facility].  She has a history of schizophrenia and mental retardation, and has been at the nursing home for more than 40 years.    She was diagnosed with breast cancer in August 2020 and underwent an FNA that showed a carcinoma measuring 7 mm in diameter.  Her cancer was grade 1, ER pr strongly positive, MA strongly positive and HER2 negative with a Ki-67 of 5%.  She underwent a lumpectomy on September 4, 2020 by Dr. Benites.  The pathology report from the procedure indicates that there was no residual carcinoma.  Given her age, no axillary dissection was performed since her axilla was clinically negative.  Also, given her small grade 1 cancer and her age, it was felt that radiation was not indicated.    She was referred for adjuvant endocrine therapy and started Anastrozole 10/2020.   Most recent mammogram was from August 2023 and was read as BI-RADS 2       Review of Systems    Overall she feels well. She appears comfortable and has no complaints.  She is here with a caregiver.  According to the caregiver, there has been no acute issues, and she has tolerated her treatment well    Objective:      Physical Exam      She is alert, oriented to self only, pleasant, well      nourished, in no acute physical distress.   She was unable to climb on the examination table and she was examined on her wheelchair                                    VITAL SIGNS:  Reviewed                                      HEENT:  Normal.  There are no nasal, oral, lip,  gingival, auricular, lid,    or conjunctival lesions.  Mucosae are moist and pink, and there is no        thrush.  Pupils are equal, reactive to light and accommodation.              Extraocular muscle movements are intact.  Dentition is fair.  There is no frontal or maxillary tenderness.                                     NECK:  Supple without JVD, adenopathy, or thyromegaly.                       LUNGS:  Clear to auscultation without wheezing, rales, or rhonchi.           CARDIOVASCULAR:  Reveals an S1, S2, no murmurs, no rubs, no gallops.         ABDOMEN:  Soft, nontender, without organomegaly.  Bowel sounds are    present.                                                                     EXTREMITIES:  No cyanosis, clubbing, or edema.                             Route Chart for Scheduling    Med Onc Chart Routing      Follow up with physician 1 year. Needs a mammogram ASAP and I have emailed her primary care physician since she lives at the [facility].  She will need to be seen here in a year with another mammogram   Follow up with ROBYN    Infusion scheduling note    Injection scheduling note    Labs    Imaging    Pharmacy appointment    Other referrals                Therapy Plan Information  DENOSUMAB (PROLIA) Q6M for Age-related osteoporosis without current pathological fracture, noted on 3/13/2013  Medications  denosumab (PROLIA) injection 60 mg  60 mg, Subcutaneous, Every 26 weeks      No therapy plan of the specified type found.    No therapy plan of the specified type found.  BREASTS:  She is status post left lumpectomy with a well-healed lateral incision at the 3 o'clock position.    There are no masses in either breast.                                        LYMPHATIC:  There is no cervical, axillary, or supraclavicular adenopathy.   SKIN:  Warm and moist, without petechiae, rashes, induration, or ecchymoses.           NEUROLOGIC:  DTRs are 0-1+ bilaterally, symmetrical, motor function is 5/5,  and  cranial nerves are  within normal limits.    Assessment:       1.  History of stage I breast cancer status post simple lumpectomy, clinically DYLAN, doing well    2.  Adjuvant use of anastrozole  Plan:         I have explained to her the caregiver that she needs to remain on anastrozole for 5 years.  She may discontinue it at the end of September.  I have emailed Dr. Chavez asking her to coordinate her mammogram since she is overdue  Her caregiver's questions were answered.  More than 30 minutes spent reviewing the chart, examining the patient, and coordinating her care (and ehr mammogram) with her PCP.

## 2025-07-06 ENCOUNTER — TELEPHONE (OUTPATIENT)
Dept: INTERNAL MEDICINE | Facility: CLINIC | Age: 85
End: 2025-07-06
Payer: MEDICARE

## 2025-07-06 DIAGNOSIS — Z12.31 SCREENING MAMMOGRAM FOR BREAST CANCER: Primary | ICD-10-CM

## 2025-07-07 NOTE — TELEPHONE ENCOUNTER
----- Message from William Costello MD sent at 7/3/2025  8:47 AM CDT -----  Good morning Carolina,  This is one of the [facility] patients.  I had not seen her since July of 2022.  She will complete 5 years of adjuvant hormonal therapy at the end of September, and she may discontinue the Arimidex at that time.  I see that her last mammogram was in August of 2023 and you had ordered it..  Do you want me to order 1 or do you want to do it?  We typically recommend mammograms were continued until the age of 90..  Please advise..    Regards,      William

## 2025-07-07 NOTE — TELEPHONE ENCOUNTER
Thanks.   I have ordered the screening mammogram and get it scheduled.   Will make sure that her armidex is stopped in September.   Carolina

## 2025-07-10 NOTE — DISCHARGE INSTRUCTIONS
Discharge Instructions: Caring for Your Incision  You are going home with stitches (sutures), surgical staples, special strips of surgical tape called Steri-Strips, or surgical skin glue. One of these items was used to close your incision, help stop bleeding, and speed healing. Follow the tips on this sheet to help your incision heal.  Home care  · Always wash your hands before touching your incision.  · Keep your incision clean and dry.  · Avoid doing things that could cause dirt or sweat to get on your incision.  · Dont pick at scabs. They help protect the wound.  · Keep your incision out of water.  · Take a sponge bath to avoid getting your incision wet, unless your healthcare provider tells you otherwise.  · Ask your provider when can you take a shower or bathe.  · Ask your provider about the best way to keep your incision dry when bathing or showering.  · Pat sutures dry if they get wet. Dont rub.  · Leave the dressing (bandage) in place until you are told to remove it or change it. Change it only as directed, using clean hands.  · After the first 12 hours, change your dressing every 24 hours, or as directed by your healthcare provider.  · Change your dressing if it gets wet or soiled.  Care for specific closures  Follow these guidelines unless your child's healthcare provider tells you otherwise:  · Sutures or staples. Once you no longer need to keep these dry, clean the incision or wound daily. First remove the bandage using clean hands. Then wash the area gently with soap and warm water. Use a wet cotton swab to loosen and remove any blood or crust that forms. After cleaning, put a thin layer of antibiotic ointment on. Then put on a new bandage.  · Skin glue. Dont put liquid, ointment, or cream on your incision or wound while the glue is in place. Avoid activities that cause heavy sweating. Protect the incision or wound from sunlight. Do not scratch, rub, or pick at the glue. Do not put tape directly  "Attempted to administer patient medication multiple times this am. Dr. Nick and ID team aware. Patient took morning meds this evening with dinner. Patient refused 1600 vitals signs, stated "get that shit off of me". Lab attempted to collect labs placed by ID, patient refused. Informed LEATHA Estrella she stated to make sure all labs patient refused are collected with morning labs. Will pass along to oncoming nurse.    " over the glue. The glue should peel off within 5 to 10 days.  · Surgical tape. Keep your incision or wound dry. If it gets wet, blot the area dry with a clean towel. Surgical tape usually falls off within 7 to 10 days. If it has not fallen off after 10 days, contact your healthcare provider before taking it off yourself. If you are told to remove the tape, put mineral oil or petroleum jelly on a cotton ball. Gently rub the tape until it is removed.  Follow-up care  Follow up with your healthcare provider to ask how long sutures or staples should be left in place. Be sure to return for suture or staple removal as directed. If dissolving stitches were used in your mouth, these will not need to be removed. They should fall out or dissolve on their own.  If tape closures were used, remove them yourself when your provider recommends if they have not fallen off on their own. If skin glue was used, the glue will wear off by itself.      When to seek medical care  Call your healthcare provider right away if you have any of the following:  · More pain, redness, swelling, bleeding, or foul-smelling discharge around the incision area  · Fever of 100.4°F (38°C) or higher, or as directed by your healthcare provider  · Shaking chills  · Vomiting or nausea that doesnt go away  · Numbness, coldness, or tingling around the incision area, or changes in skin color  · Opening of the sutures or wound  · Stitches or staples come apart or fall out or surgical tape falls off before 7 days, or as directed by your provider   Date Last Reviewed: 10/22/2014  © 1696-7564 [employer]. [address] [place] [address], [place] [address]. All rights reserved. This information is not intended as a substitute for professional medical care. Always follow your healthcare professional's instructions.

## 2025-08-13 DIAGNOSIS — Z78.0 MENOPAUSE: ICD-10-CM

## (undated) DEVICE — GAUZE SPONGE 4X4 12PLY

## (undated) DEVICE — SPONGE LAP 18X18 PREWASHED

## (undated) DEVICE — ELECTRODE REM PLYHSV RETURN 9

## (undated) DEVICE — GAUZE FLUFF XXLG 36X36 2 PLY

## (undated) DEVICE — BRA POST SURGICAL WHT 40-42IN

## (undated) DEVICE — TRAY MINOR GEN SURG

## (undated) DEVICE — ELECTRODE BLADE INSULATED 1 IN

## (undated) DEVICE — SEE MEDLINE ITEM 146417

## (undated) DEVICE — COVER PROBE NL STRL 3.6X96IN

## (undated) DEVICE — MARGIN MARKER STANDARD 6 COLOR

## (undated) DEVICE — SUT 2/0 30IN SILK BLK BRAI

## (undated) DEVICE — SUT VICRYL 3-0 27 SH

## (undated) DEVICE — SUT MCRYL PLUS 4-0 PS2 27IN

## (undated) DEVICE — PACK UNIVERSAL SPLIT II

## (undated) DEVICE — CONTAINER SPECIMEN STRL 4OZ

## (undated) DEVICE — DRAPE STERI INSTRUMENT 1018

## (undated) DEVICE — APPLIER CLIP LIAGCLIP 9.375IN

## (undated) DEVICE — NDL 18GA X1 1/2 REG BEVEL

## (undated) DEVICE — SOL 9P NACL IRR PIC IL

## (undated) DEVICE — STOCKINETTE DBL PLY ST 4X